# Patient Record
Sex: FEMALE | Race: BLACK OR AFRICAN AMERICAN | Employment: OTHER | URBAN - METROPOLITAN AREA
[De-identification: names, ages, dates, MRNs, and addresses within clinical notes are randomized per-mention and may not be internally consistent; named-entity substitution may affect disease eponyms.]

---

## 2017-01-08 ENCOUNTER — HOSPITAL ENCOUNTER (EMERGENCY)
Age: 62
Discharge: HOME | End: 2017-01-08
Attending: EMERGENCY MEDICINE
Payer: COMMERCIAL

## 2017-01-08 VITALS
HEART RATE: 74 BPM | DIASTOLIC BLOOD PRESSURE: 52 MMHG | TEMPERATURE: 97.5 F | SYSTOLIC BLOOD PRESSURE: 111 MMHG | RESPIRATION RATE: 16 BRPM | OXYGEN SATURATION: 97 %

## 2017-01-08 DIAGNOSIS — F10.920 ACUTE ALCOHOL INTOXICATION, UNCOMPLICATED (HCC): Primary | ICD-10-CM

## 2017-01-08 LAB
ANION GAP BLD CALC-SCNC: 8 MMOL/L (ref 3–18)
BASOPHILS # BLD AUTO: 0 K/UL (ref 0–0.1)
BASOPHILS # BLD: 0 % (ref 0–2)
BUN SERPL-MCNC: 27 MG/DL (ref 7–18)
BUN/CREAT SERPL: 36 (ref 12–20)
CALCIUM SERPL-MCNC: 7.9 MG/DL (ref 8.5–10.1)
CHLORIDE SERPL-SCNC: 104 MMOL/L (ref 100–108)
CO2 SERPL-SCNC: 29 MMOL/L (ref 21–32)
CREAT SERPL-MCNC: 0.76 MG/DL (ref 0.6–1.3)
DIFFERENTIAL METHOD BLD: ABNORMAL
EOSINOPHIL # BLD: 0 K/UL (ref 0–0.4)
EOSINOPHIL NFR BLD: 0 % (ref 0–5)
ERYTHROCYTE [DISTWIDTH] IN BLOOD BY AUTOMATED COUNT: 14.3 % (ref 11.6–14.5)
ETHANOL SERPL-MCNC: 235 MG/DL (ref 0–3)
GLUCOSE SERPL-MCNC: 94 MG/DL (ref 74–99)
HCT VFR BLD AUTO: 38.8 % (ref 35–45)
HGB BLD-MCNC: 12.2 G/DL (ref 12–16)
LYMPHOCYTES # BLD AUTO: 33 % (ref 21–52)
LYMPHOCYTES # BLD: 1.7 K/UL (ref 0.9–3.6)
MCH RBC QN AUTO: 28.8 PG (ref 24–34)
MCHC RBC AUTO-ENTMCNC: 31.4 G/DL (ref 31–37)
MCV RBC AUTO: 91.5 FL (ref 74–97)
MONOCYTES # BLD: 0.2 K/UL (ref 0.05–1.2)
MONOCYTES NFR BLD AUTO: 3 % (ref 3–10)
NEUTS SEG # BLD: 3.3 K/UL (ref 1.8–8)
NEUTS SEG NFR BLD AUTO: 64 % (ref 40–73)
PLATELET # BLD AUTO: 140 K/UL (ref 135–420)
PMV BLD AUTO: 13.1 FL (ref 9.2–11.8)
POTASSIUM SERPL-SCNC: 4.2 MMOL/L (ref 3.5–5.5)
RBC # BLD AUTO: 4.24 M/UL (ref 4.2–5.3)
SODIUM SERPL-SCNC: 141 MMOL/L (ref 136–145)
WBC # BLD AUTO: 5.2 K/UL (ref 4.6–13.2)

## 2017-01-08 PROCEDURE — 80307 DRUG TEST PRSMV CHEM ANLYZR: CPT | Performed by: EMERGENCY MEDICINE

## 2017-01-08 PROCEDURE — 85025 COMPLETE CBC W/AUTO DIFF WBC: CPT | Performed by: EMERGENCY MEDICINE

## 2017-01-08 PROCEDURE — 80048 BASIC METABOLIC PNL TOTAL CA: CPT | Performed by: EMERGENCY MEDICINE

## 2017-01-08 PROCEDURE — 99284 EMERGENCY DEPT VISIT MOD MDM: CPT

## 2017-01-08 RX ORDER — SODIUM CHLORIDE 9 MG/ML
75 INJECTION, SOLUTION INTRAVENOUS CONTINUOUS
Status: DISCONTINUED | OUTPATIENT
Start: 2017-01-08 | End: 2017-01-08 | Stop reason: HOSPADM

## 2017-01-08 NOTE — ED NOTES
Pt arrives by medic heavily intoxicated. Pt came home drunk in someone else's wheelchair and grandson found her on the floor with pillows.

## 2017-01-08 NOTE — ED NOTES
Patient noted to have low BP Dr Bobby Zambrano made aware will administer bolus as ordered. Patient is easily arousable and lucid when awakened.

## 2017-01-08 NOTE — ED NOTES
On arrival Dr Alana Wilson reported to bedside and pt admitted to having alcohol tonight. Pt denied having any complaints stating, \"I feel fine. \"

## 2017-01-08 NOTE — ED PROVIDER NOTES
HPI Comments: Antoinette Mahajan Is a 57-year-old female past medical history of asthma, hyperlipidemia, hypertension, pulmonary hypertension, COPD, obstructive sleep apnea, diastolic heart failure, continuous supplemental oxygen, latent TB treated in 1986, Chronic lung disease who presents with    Alcoholl intoxication. Patient has no complaints whatsoever she did not want to come she states she feels fine. She was reportedly watching her 5year-old grandson she got out and admitted she has been drinking very heavily he found her on the floor and called paramedics. Paramedics stated that they had a difficult time getting the patient to be seen she states she will be she feels fine. She continues to state that she is fine. And she has no complaints whatsoever. The grandson's mother is on her way here. No other aggravating or alleviating factors. No other associated symptoms. This document was created with voice recognition technology and unrecognized errors may be present. Patient is a 59 y.o. female presenting with intoxication. Alcohol intoxication   Primary symptoms include: intoxication. Past Medical History:   Diagnosis Date    LALO (acute kidney injury) ([facility] [address] 12/27/2015    ARF (acute respiratory failure) ([facility] [address] 12/27/2015    Asthma     Chronic lung disease     COPD (chronic obstructive pulmonary disease) (HCC)     Dependence on continuous supplemental oxygen     Diastolic heart failure (HCC)     Echocardiogram abnormal 12/29/2015     Hyperdynamic. EF 75%. No WMA. Gr 1 DDfx. Mild RVE. RVSP 60-70 mmHg (prev 120 mmHg on 10/1/15). Mild LAE. Mod-severe ELISHA. Mild-mod TR.  Gastric wall thickening 2009     Chronic Proximal Gastric Wall Calcifications.      Hyperlipidemia     Hypertension     ANNELIESE (obstructive sleep apnea)     Pulmonary arterial hypertension (HCC)     Pulmonary hypertension (HCC)      RVSP 120 mm Hg, cor pulmonale (echo Oct 2015)    Right upper lobe pneumonia 12/27/2015     Cavitary RUL Pneumonia    TB lung, latent 1986     Diagnosed/Treated.  Thallium stress test 11/12/2002     No prior infarction. Minimal to mild apical ischemia vs apical thinning. RVE.  Tuberculosis        Past Surgical History:   Procedure Laterality Date    Hx gastric bypass      Hx tubal ligation      Hx dilation and curettage      Hx tonsillectomy      Hx orthopaedic       bone spur    Hx hysterectomy      Bronchoscopy  01/04/2016     Dr. Gerri Noguera          History reviewed. No pertinent family history. Social History     Social History    Marital status: SINGLE     Spouse name: N/A    Number of children: N/A    Years of education: N/A     Occupational History    Not on file. Social History Main Topics    Smoking status: Former Smoker     Packs/day: 1.50     Years: 20.00     Types: Cigarettes     Quit date: 1/1/1980    Smokeless tobacco: Never Used    Alcohol use Yes      Comment: social     Drug use: No    Sexual activity: Not on file     Other Topics Concern    Not on file     Social History Narrative         ALLERGIES: Review of patient's allergies indicates no known allergies. Review of Systems   All other systems reviewed and are negative. There were no vitals filed for this visit. Physical Exam   Constitutional: She is oriented to person, place, and time. She appears well-developed. HENT:   Head: Normocephalic and atraumatic. Eyes: EOM are normal. Pupils are equal, round, and reactive to light. Neck: Normal range of motion. Neck supple. Cardiovascular: Normal rate, regular rhythm and normal heart sounds. Exam reveals no friction rub. No murmur heard. Pulmonary/Chest: Effort normal and breath sounds normal. No respiratory distress. She has no wheezes. Abdominal: Soft. She exhibits no distension. There is no tenderness. There is no rebound and no guarding. Musculoskeletal: Normal range of motion.    Neurological: She is alert and oriented to person, place, and time. Skin: Skin is warm and dry. Psychiatric: She has a normal mood and affect. Her behavior is normal. Thought content normal.        MDM  Number of Diagnoses or Management Options  Diagnosis management comments:  Patient presents admitting to drinking heavily moderately intoxicated and is awake and alert. She has no complaints whatsoever and I do not see any blood work or imaging indicated. Will wait for family to arrive. I think the key issue she was on the floor and her 5year-old grandson did not know what to do so he called paramedics. He is currently in the custody of a concerned neighbor and his mother is on her way here. Will obs here; daughter can not go ot pt's house and pt can not go to daughters house due to needing oxygen.      ED Course       Procedures

## 2017-01-08 NOTE — ED NOTES
Received bedside report from BRYN Arriaga. Pt currently resting in bed on cardiac monitor with normal saline infusing. Pt appears to be intoxicated. Pt is discharged once alcohol intoxication decreases. Will continue to monitor.

## 2017-01-08 NOTE — ED NOTES
Patient brought in by EMS after they were called by the patients grand child after she was found on the ground in their apartment next to a strange wheel chair and could not be woken up she has etoh on breath.

## 2017-01-08 NOTE — DISCHARGE INSTRUCTIONS
On axial load. Acute Alcohol Intoxication: Care Instructions  Your Care Instructions  You have had treatment to help your body rid itself of alcohol. Too much alcohol upsets the body's fluid balance. Your doctor may have given you fluids and vitamins. For some people, drinking too much alcohol is a one-time event. For others, it is an ongoing problem. In either case, it is serious. It can be life-threatening. Follow-up care is a key part of your treatment and safety. Be sure to make and go to all appointments, and call your doctor if you are having problems. It's also a good idea to know your test results and keep a list of the medicines you take. How can you care for yourself at home? · Be safe with medicines. Take your medicines exactly as prescribed. Call your doctor if you think you are having a problem with your medicine. · Your doctor may have prescribed disulfiram (Antabuse). Do not drink any alcohol while you are taking this medicine. You may have severe or even life-threatening side effects from even small amounts of alcohol. · If you were given medicine to prevent nausea, be sure to take it exactly as prescribed. · Before you take any medicine, tell your doctor if:  ¨ You have had a bad reaction to any medicines in the past.  ¨ You are taking other medicines, including over-the-counter ones, or have other health problems. ¨ You are or could be pregnant. · Be prepared to have some symptoms of withdrawal in the next few days. · Drink plenty of liquids in the next few days. · Seek help if you need it to stop drinking. Getting counseling and joining a support group can help you stay sober. Try a support group such as Alcoholics Anonymous. · Avoid alcohol when you take medicines. It can react with many medicines and cause serious problems. When should you call for help? Call 911 anytime you think you may need emergency care.  For example, call if:  · You feel confused and are seeing things that are not there. · You are thinking about killing yourself or hurting others. · You have a seizure. · You vomit blood or what looks like coffee grounds. Call your doctor now or seek immediate medical care if:  · You have trembling, restlessness, sweating, and other withdrawal symptoms that are new or that get worse. · Your withdrawal symptoms come back after not bothering you for days or weeks. · You can't stop vomiting. Watch closely for changes in your health, and be sure to contact your doctor if:  · You need help to stop drinking. Where can you learn more? Go to http://eduar-carlos.info/. Enter T102 in the search box to learn more about \"Acute Alcohol Intoxication: Care Instructions. \"  Current as of: May 27, 2016  Content Version: 11.1  © 5769-0509 [employer]. Care instructions adapted under license by [employer] (which disclaims liability or warranty for this information). If you have questions about a medical condition or this instruction, always ask your healthcare professional. [address] any warranty or liability for your use of this information.

## 2017-01-08 NOTE — ED NOTES
Pt with a discharge plan by Dr. Vipul Diez. Pt has multiple comorbidities and per reports drank heavily. She was found to be poorly responsive. Pt clinically intoxicated per report. Pt in the ED has slurred speech but is moving extremities well. Pt is on home oxygen and BP was low with some response to fluids. Will follow renal function, etoh to confirm the cause of her AMS, CBC, and then reevaluate. Marcel Choudhury, DO 8:31 AM      Labs confirm her intoxication. Pt is alert and wants to ambulate. Will continue to follow for clinical sobriety. Marcel Choudhury, DO 11:42 AM    Pt is oriented x 4 and eating. She would like to go home. Pt notes she has some R sided frontal scalp pain, a small hemtoma is possible. Offered a CT but she denies HA, nausea, vomiting so she refused, and I think that is reasonable. She admits to having vodka for the first time in a while last night. Pt is clinically sober so will proceed with close outpatient care.  Marcel Choudhury, DO 1:16 PM

## 2017-02-10 ENCOUNTER — OFFICE VISIT (OUTPATIENT)
Dept: PULMONOLOGY | Age: 62
End: 2017-02-10

## 2017-02-10 ENCOUNTER — HOSPITAL ENCOUNTER (OUTPATIENT)
Dept: MAMMOGRAPHY | Age: 62
Discharge: HOME | End: 2017-02-10
Attending: FAMILY MEDICINE
Payer: MEDICAID

## 2017-02-10 VITALS
RESPIRATION RATE: 18 BRPM | HEART RATE: 80 BPM | TEMPERATURE: 98.5 F | SYSTOLIC BLOOD PRESSURE: 124 MMHG | WEIGHT: 166 LBS | HEIGHT: 65 IN | OXYGEN SATURATION: 97 % | BODY MASS INDEX: 27.66 KG/M2 | DIASTOLIC BLOOD PRESSURE: 70 MMHG

## 2017-02-10 DIAGNOSIS — J44.9 CHRONIC OBSTRUCTIVE PULMONARY DISEASE, UNSPECIFIED COPD TYPE (HCC): Primary | ICD-10-CM

## 2017-02-10 DIAGNOSIS — J96.11 CHRONIC HYPOXEMIC RESPIRATORY FAILURE (HCC): ICD-10-CM

## 2017-02-10 DIAGNOSIS — I27.20 PULMONARY HTN (HCC): ICD-10-CM

## 2017-02-10 DIAGNOSIS — Z12.31 VISIT FOR SCREENING MAMMOGRAM: ICD-10-CM

## 2017-02-10 PROCEDURE — 77067 SCR MAMMO BI INCL CAD: CPT

## 2017-02-10 RX ORDER — FUROSEMIDE 40 MG/1
40 TABLET ORAL DAILY
Qty: 30 TAB | Refills: 3 | Status: SHIPPED | OUTPATIENT
Start: 2017-02-10

## 2017-02-10 RX ORDER — FLUTICASONE FUROATE AND VILANTEROL 100; 25 UG/1; UG/1
1 POWDER RESPIRATORY (INHALATION) DAILY
Qty: 1 INHALER | Refills: 3 | Status: SHIPPED | OUTPATIENT
Start: 2017-02-10 | End: 2017-11-28 | Stop reason: SDUPTHER

## 2017-02-10 RX ORDER — DULOXETIN HYDROCHLORIDE 60 MG/1
60 CAPSULE, DELAYED RELEASE ORAL DAILY
COMMUNITY
End: 2021-02-12

## 2017-02-10 RX ORDER — FLUTICASONE FUROATE AND VILANTEROL 100; 25 UG/1; UG/1
1 POWDER RESPIRATORY (INHALATION) DAILY
Qty: 1 INHALER | Refills: 0 | Status: SHIPPED | COMMUNITY
Start: 2017-02-10 | End: 2017-11-28 | Stop reason: CLARIF

## 2017-02-10 RX ORDER — SILDENAFIL CITRATE 20 MG/1
20 TABLET ORAL 3 TIMES DAILY
Qty: 90 TAB | Refills: 5 | Status: SHIPPED | OUTPATIENT
Start: 2017-02-10 | End: 2018-05-14 | Stop reason: SDUPTHER

## 2017-02-10 NOTE — MR AVS SNAPSHOT
Visit Information Date & Time Provider Department Dept. Phone Encounter #  
 2/10/2017  9:30 AM Ankit Mcgill MD [facility] [place]  Follow-up Instructions Return in about 2 months (around 4/10/2017). Follow-up and Disposition History Your Appointments 8/25/2017 10:40 AM  
Follow Up with Zaida Zendejas MD  
[facility] ([facility]) Appt Note: Follow up in 1 year [place] [address] [address] [address] [phone] [address] Upcoming Health Maintenance Date Due Hepatitis C Screening 1955 DTaP/Tdap/Td series (1 - Tdap) 5/5/1974 PAP AKA CERVICAL CYTOLOGY 5/5/1974 FOBT Q 1 YEAR AGE 50-75 5/5/2003 ZOSTER VACCINE AGE 60> 5/5/2013 BREAST CANCER SCRN MAMMOGRAM 10/24/2014 INFLUENZA AGE 9 TO ADULT 8/1/2016 Allergies as of 2/10/2017  Review Complete On: 2/10/2017 By: Ankit Mcgill MD  
 No Known Allergies Current Immunizations  Reviewed on 11/30/2016 Name Date Influenza Vaccine 9/1/2015 Influenza Vaccine (Quad) PF 1/11/2016  5:14 PM  
 Pneumococcal Conjugate (PCV-13) 5/9/2016 11:30 AM  
 Pneumococcal Polysaccharide (PPSV-23) 1/11/2016  5:25 PM  
  
 Not reviewed this visit You Were Diagnosed With   
  
 Codes Comments Chronic obstructive pulmonary disease, unspecified COPD type ([email or web address]    -  Primary ICD-10-CM: J44.9 ICD-9-CM: 262 Pulmonary HTN ([email or web address]     ICD-10-CM: I27.2 ICD-9-CM: 416.8 Chronic hypoxemic respiratory failure (HCC)     ICD-10-CM: J96.11 
ICD-9-CM: 518.83, 799.02 Vitals BP Pulse Temp Resp Height(growth percentile) Weight(growth percentile) 124/70 (BP 1 Location: Left arm, BP Patient Position: Sitting) 80 98.5 °F (36.9 °C) (Oral) 18 5' 5\" (1.651 m) 166 lb (75.3 kg) SpO2 BMI OB Status Smoking Status 97% 27.62 kg/m2 Postmenopausal Former Smoker Vitals History BMI and BSA Data Body Mass Index Body Surface Area  
 27.62 kg/m 2 1.86 m 2 Preferred Pharmacy Pharmacy Name Phone [facility] [address] [phone] Your Updated Medication List  
  
   
This list is accurate as of: 2/10/17 10:19 AM.  Always use your most recent med list.  
  
  
  
  
 albuterol 90 mcg/actuation inhaler Commonly known as:  PROVENTIL HFA, VENTOLIN HFA, PROAIR HFA Take 2 Puffs by inhalation every six (6) hours as needed for Wheezing. albuterol-ipratropium 2.5 mg-0.5 mg/3 ml Nebu Commonly known as:  DUO-NEB  
3 mL by Nebulization route four (4) times daily. Ambrisentan 5 mg tablet Commonly known as:  LETAIRIS Take 5 mg by mouth daily. Indications: PULMONARY ARTERIAL HYPERTENSION  
  
 b complex vitamins tablet Commonly known as:  B-COMPLEX Take 1 Tab by mouth daily. benazepril 20 mg tablet Commonly known as:  LOTENSIN Take 20 mg by mouth daily. fluticasone 50 mcg/actuation nasal spray Commonly known as:  Sudhir Sacks 2 Sprays by Both Nostrils route daily. * fluticasone-vilanterol 100-25 mcg/dose inhaler Commonly known as:  BREO ELLIPTA Take 1 Puff by inhalation daily. * fluticasone-vilanterol 100-25 mcg/dose inhaler Commonly known as:  BREO ELLIPTA Take 1 Puff by inhalation daily. furosemide 40 mg tablet Commonly known as:  LASIX Take 1 Tab by mouth daily. gabapentin 300 mg capsule Commonly known as:  NEURONTIN Take 300 mg by mouth three (3) times daily. Incontinence Pad, Liner, Disp Pads Commonly known as:  BLADDER CONTROL PADS FOR WOMEN  
1 Each by Does Not Apply route two (2) times a day. OXYGEN-AIR DELIVERY SYSTEMS  
6 L by Does Not Apply route. Uses 4 l when out  
  
 oxymetazoline 0.05 % nasal spray Commonly known as:  AFRIN  
2 Sprays two (2) times a day. pravastatin 20 mg tablet Commonly known as:  PRAVACHOL Take 20 mg by mouth nightly. sildenafil 20 mg tablet Commonly known as:  REVATIO Take 1 Tab by mouth three (3) times daily. * Notice: This list has 2 medication(s) that are the same as other medications prescribed for you. Read the directions carefully, and ask your doctor or other care provider to review them with you. Prescriptions Sent to Pharmacy Refills  
 sildenafil (REVATIO) 20 mg tablet 5 Sig: Take 1 Tab by mouth three (3) times daily. Class: Normal  
 Pharmacy: [place] [id number]. Ph #: [phone] Route: Oral  
 furosemide (LASIX) 40 mg tablet 3 Sig: Take 1 Tab by mouth daily. Class: Normal  
 Pharmacy: [place] [address]. Ph #: [phone] Route: Oral  
 fluticasone-vilanterol (BREO ELLIPTA) 100-25 mcg/dose inhaler 3 Sig: Take 1 Puff by inhalation daily. Class: Normal  
 Pharmacy: [place] [address]. Ph #: [phone] Route: Inhalation Follow-up Instructions Return in about 2 months (around 4/10/2017). To-Do List   
 02/10/2017 12:00 PM  
  Appointment with ANA MARIA WHITFIELD 2 at [address] [place] [address] ([phone]) OUTSIDE FILMS  - Any outside films related to the study being scheduled should be brought with you on the day of the exam.  If this cannot be done there may be a delay in the reading of the study. MEDICATIONS  - Patient must bring a complete list of all medications currently taking to include prescriptions, over-the-counter meds, herbals, vitamins & any dietary supplements  GENERAL INSTRUCTIONS  - On the day of your exam do not use any bath powder, deodorant or lotions on the armpit area. -Tenderness of breasts may cause an increase of discomfort during procedure.   If you are experiencing breast tenderness on the day of your appointment and would like to reschedule, please call [phone]. Introducing [facility]! Dear Desmond Stephens: Thank you for requesting a [employer] account. Our records indicate that you already have an active [employer] account. You can access your account anytime at [email or web address] Did you know that you can access your hospital and ER discharge instructions at any time in [employer]? You can also review all of your test results from your hospital stay or ER visit. Additional Information If you have questions, please visit the Frequently Asked Questions section of the [employer] website at [email or web address] Remember, [employer] is NOT to be used for urgent needs. For medical emergencies, dial 911. Now available from your iPhone and Android! Please provide this summary of care documentation to your next provider. Your primary care clinician is listed as [address]. If you have any questions after today's visit, please call [phone].

## 2017-02-10 NOTE — PROGRESS NOTES
[facility]  Pulmonary, Critical Care, and Sleep Medicine  Pulmonary Office    Name: Akash Duarte MRN: [id number]   : 1955 Hospital:    Date: 2/10/2017  Room: Room/bed info not found     Subjective:     COPD and Pulmonary HTN  Patient is a 59 y. o.AA female with severe COPD and Pulmonary HTN with hypoxemia needing home oxygen with low O2 sats at baseline. She was started on Letairis 5 mg daily. She was admitted to [facility] end of Dec 2015 with respiratory failure, worsened hypoxemia, cough, blood streaked sputum. CT chest and CXR showing new RT upper lobe pneumonia and cavitation. Due to past hx of +ve PPD with INH treatment, she was in respiratory isolation. She needed to be in icu with need for BIPAP and high flow oxygen. She was taken off respiratory isolation after 3 sputum samples with negative stain for AFB. She also underwent a bronchoscopy and BAL in the icu on 16. 2 sputum samples have shown ELSA and patient started on triple antimycobacterial therapy since 16 . She took triple therapy for 5 months and due to loss of insurance cover, patient was unable to get medications since 2016. CT chest 2016 shows significant improvement suggesting that the right upper lobe cavitary pneumonia was bacterial. Patient was subsequently discharged on home O2 at 7-10 lits concentration. Patient oxygenation has improved steadily and is now down to 3 lits nc O2 at home at rest and 4 lits on exertion. Patient has come for follow up  Patient breathing remains good and stable; she does her own house work and goes to shopping in [place] with portable oxygen. She would stop to rest frequently. NYHA 2 dyspnea  She uses 4 lits portable O2 for ambulation. She has no cough, no hemoptysis  No night sweats;  No chest pains or fever or chills  Weight stable  RT wrist pains - carrpal tunnel syndrome, awaiting surgery - planned in [place] [id number] next week    She is on Duonebs qid at home but uses prn nowadays, prn albut inh, she could not afford budesonide nebs  She is on oral Ambrisentan 5 mg daily  She is now on sildenafil     [facility] ER visit in Jan 2017 related to alcohol     She has no prior hx of CTD or Sarcoidosis or DVTs or PEs. SH: She stopped smoking about 20 years ago. Patient admits to drinking excessive wine > 2 glasses on some days      RIGHT HEART CATH DR [facility] 10/23/15  Findings/PostOp Diagnosis:  1. Severe elevation of right sided pressures  2. Mild elevation of left sided pressures  3. Severe pulmonary hypertension  4. No intracardiac left-to-right shunting by oximetry run in the setting of significant arterial hypoxemia on room air  5. Mildly decreased cardiac output  6. Severely elevated pulmonary vascular resistance  7. No significant change in PA pressure or PVR after inhaled NO and oxygen supplementation    Plan:  1.  Medical management as outpatient; she will be started on supplemental oxygen by her Pulmonologist    Procedures:  * LHC   * RHC  * Pharmacologic intervention and repeat hemodynamic assessment    Hemodynamics:     Baseline    RA: 19 mmHg  RV: 90/20 mmHg  PA: 88/34 mmHg Mean 52  PCWP: 17 mmHg  LV: 130/14 mmHg  CO: 4 L/min by thermodilution, 4.3 L/min by Reba  CI: 2.2 L/Min/m2, 2.3 L/min by Reba    Oxygen saturation (%):   SVC:49  IVC:45  PA:46  LV:70  No evidence of intracardiac shunts by saturation measurements    Pulmonary Vascular Resistance: 700 dynes * sec * cm-5; 8.8 Wood units    After inhaled NO 80 ppm for 10 minutes    RA: 18 mm Hg  PA: 80/32 mmHg, mean 45  PCWP: 16 mmHg  LV: 135/15 mmHg  Ao: 132/70 mmHg    CO/CI (thermodilution): 3.8 L/min, 2 L/min/m2    Pulmonary Vascular Resistance: 610 dynes * sec * cm-5; 7.7 Wood units        Past Medical History   Diagnosis Date    [facility]ALO [facility]acute kidney injury) ([facility] [address] 12/27/2015    [facility]F [facility] respiratory failure[facility] ([facility]) 12/27/2015    Asthma     Chronic lung disease     COPD (chronic obstructive pulmonary disease) ([facility] [address]     Dependence on continuous supplemental oxygen     Diastolic heart failure (HCC)     Echocardiogram abnormal 12/29/2015     Hyperdynamic. EF 75%. No WMA. Gr 1 DDfx. Mild RVE. RVSP 60-70 mmHg (prev 120 mmHg on 10/1/15). Mild LAE. Mod-severe ELISHA. Mild-mod TR.  Gastric wall thickening 2009     Chronic Proximal Gastric Wall Calcifications.  Hyperlipidemia     Hypertension     ANNELIESE (obstructive sleep apnea)     Pulmonary arterial hypertension (HCC)     Pulmonary hypertension (HCC)      RVSP 120 mm Hg, cor pulmonale (echo Oct 2015)    Right upper lobe pneumonia 12/27/2015     Cavitary RUL Pneumonia    TB lung, latent 1986     Diagnosed/Treated.  Thallium stress test 11/12/2002     No prior infarction. Minimal to mild apical ischemia vs apical thinning. RVE.  Tuberculosis       Past Surgical History   Procedure Laterality Date    Hx gastric bypass      Hx tubal ligation      Hx dilation and curettage      Hx tonsillectomy      Hx orthopaedic       bone spur    Hx hysterectomy      Bronchoscopy  01/04/2016     Dr. Ottoniel Wu       No Known Allergies   Social History   Substance Use Topics    Smoking status: Former Smoker     Packs/day: 1.50     Years: 20.00     Types: Cigarettes     Quit date: 1/1/1980    Smokeless tobacco: Never Used    Alcohol use Yes      Comment: social          Current Outpatient Prescriptions   Medication Sig    OXYGEN-AIR DELIVERY SYSTEMS 6 L by Does Not Apply route. Uses 4 l when out    pravastatin (PRAVACHOL) 20 mg tablet Take 20 mg by mouth nightly.  sildenafil (REVATIO) 20 mg tablet Take 1 Tab by mouth three (3) times daily.  Ambrisentan (LETAIRIS) 5 mg tablet Take 5 mg by mouth daily. Indications: PULMONARY ARTERIAL HYPERTENSION    oxymetazoline (AFRIN) 0.05 % nasal spray 2 Sprays two (2) times a day.  b complex vitamins (B-COMPLEX) tablet Take 1 Tab by mouth daily.     fluticasone (FLONASE) 50 mcg/actuation nasal spray 2 Sprays by Both Nostrils route daily.  furosemide (LASIX) 40 mg tablet Take 1 Tab by mouth daily.  albuterol (PROVENTIL HFA, VENTOLIN HFA, PROAIR HFA) 90 mcg/actuation inhaler Take 2 Puffs by inhalation every six (6) hours as needed for Wheezing.  albuterol-ipratropium (DUO-NEB) 2.5 mg-0.5 mg/3 ml nebulizer solution 3 mL by Nebulization route four (4) times daily.  benazepril (LOTENSIN) 20 mg tablet Take 20 mg by mouth daily.  gabapentin (NEURONTIN) 300 mg capsule Take 300 mg by mouth three (3) times daily.  Incontinence Pad, Liner, Disp (BLADDER CONTROL PADS FOR WOMEN) pads 1 Each by Does Not Apply route two (2) times a day. No current facility-administered medications for this visit.         Review of Systems:  HEENT: + epistaxis, no nasal drainage, no difficulty in swallowing  Respiratory: as above; no hemoptysis  Cardiovascular: no chest pain, no palpitations, no chronic leg edema, no syncope  Gastrointestinal: no abd pain, no vomiting, no diarrhea, no bleeding symptoms  Genitourinary: No urinary symptoms  Integument/breast: No ulcers  Musculoskeletal:Neg  Neurological: No focal weakness, no seizures, no headaches  Behvioral/Psych: No anxiety, no depression  Constitutional: No fever, no chills, no more weight loss, no night sweats   Lymphatic: no lumps  Allergies: no symptoms    Objective:   Vital Signs:    Visit Vitals    /70 (BP 1 Location: Left arm, BP Patient Position: Sitting)    Pulse 80    Temp 98.5 °F (36.9 °C) (Oral)    Resp 18    Ht 5' 5\" (1.651 m)    Wt 75.3 kg (166 lb)    SpO2 97%  Comment: oxygen @ 4 L    BMI 27.62 kg/m2         Physical Exam:   General: comfortable; on 3 lits nc; acyanotic  No jaundice in sclerae  Neck: No adenopathy or thyroid swelling  CVS: S1S2 no murmurs; JVD not seen; no HJ reflux; 2nd HS soft  RS: Good AE bilateral; lungs clear, symmetrical breath sounds  Abd: soft, non tender  Neuro: non focal, awake, alert  Extrm: no leg edema or clubbing  Skin: no rash      Data review:        Ref. Range 11/5/2015 11:35   Angiotensin Converting Enzyme (ACE) Latest Range: 14 - 82 U/L <14 (L)   C-Reactive Protein, Qt Latest Range: 0.0-0.5 mg/dL 0.7 (H)   CCP Antibody IgG Latest Range: <=2.9 U/mL 0.7       1/1/2016 12:35 AM - Lab In [facility]       Component Results      Component Value Flag Ref Range Units Status     QuantiFERON TB Gold Indeterminate  NEGATIVE   Final     1/1/2016  2:36 PM - Lab In [facility]       Component Results      Component Value Flag Ref Range Units Status     Source SPUTUM     Final     M. tuberculosis DNA, PCR NEGATIVE   NEGATIVE    Final     11/7/2015  1:41 PM - [facility]       Component Results      Component Value Flag Ref Range Units Status     ANTI-DNA (DS) AB QN <1  0 - 9  IU/mL Final     Comment:                                         Negative      <5                                      Equivocal  5 - 9                                      Positive      >9        EMANUEL RIBONUCLEOPROTEIN AB <0.2  0.0 - 0.9  AI Final     EMANUEL SMITH AB <0.2  0.0 - 0.9  AI Final     Scleroderma Ab <0.2  0.0 - 0.9  AI Final     ANTI SS-A <0.2  0.0 - 0.9  AI Final     ANTI SS-B <0.2  0.0 - 0.9  AI Final     ANTICHROMATIN IGG ANTIBODIES <0.2  0.0 - 0.9  AI Final     JO1 ANTIBODY <0.2  0.0 - 0.9  AI Final     Anti-Centromere B Antibodies <0.2  0.0 - 0.9  AI Final        Ref. Range 6/28/2016 10:52   Bilirubin, total Latest Ref Range: 0.2-1.0 MG/DL 0.3   Bilirubin, direct Latest Ref Range: 0.0-0.2 MG/DL 0.1   Protein, total Latest Ref Range: 6.4-8.2 g/dL 7.1   Albumin Latest Ref Range: 3.4-5.0 g/dL 3.9   Globulin Latest Ref Range: 2.0-4.0 g/dL 3.2   A-G Ratio Latest Ref Range: 0.8-1.7   1.2   ALT Latest Ref Range: 13-56 U/L 32   AST Latest Ref Range: 15-37 U/L 31   Alk. phosphatase Latest Ref Range:  U/L 79         ECHO 12/29/2015  SUMMARY:  Left ventricle: Size was normal. Systolic function was hyperdynamic by visual assessment.  Ejection fraction was estimated to be 75 %. No obvious  wall motion abnormalities identified in the views obtained. Wall thickness was normal. Doppler parameters were consistent with abnormal left  ventricular relaxation (grade 1 diastolic dysfunction). Right ventricle: The ventricle was mildly dilated. Systolic function was mildly reduced. Wall thickness was increased. Systolic pressure was  moderately increased. Estimated peak pressure was in the range of 60 mmHg to 70 mmHg. Left atrium: The atrium was mildly dilated. Right atrium: The atrium was moderately to severely dilated. Tricuspid valve: There was mild to moderate regurgitation. Inferior vena cava, hepatic veins: The respirophasic change in diameter was less than 50%. COMPARISONS:  Comparison was made with the previous study of 01-Oct-2015. Previous systolic pulmonary artery pressure of 120 mmHg has decreased to 60-70 mmHg.       PFTs 10/20/2015  Flows:  FEV 1% is reduced  FEV1 0.66 L(31%)  FVC 1.82 L (67%)    Volumes:  Vital Capacity is reduced, Residual Volume is elevated and Total  Lung Capacity is normal (95%)    Flow Volume Loop:  Nonspecific obstructive pattern in Flow Volume Loop    Bronchodilator:  Significant improvement with bronchodilator  FVC 540cc / 30%, FEV1 150cc / 22%    Diffusion:  Abnormal Diffusion Capacity reduced to 29% predicted, corrected for Hb of 17.4 gm/dl    Impression:  Very Severe Airflow Obstructive defect  Air trapping    Significant bronchodilator response  Severe decrease in corrected diffusion capacity  Polycythemia    Imaging:  I have personally reviewed the patients radiographs and have reviewed the reports:  CT chest 12/28/15  IMPRESSION:    1. Large, 6 cm, air and fluid-filled irregular peripheral cavity at the superior right medial chest  -caudal to the cavity there is a large, 9 cm region of intermediate to low attenuation, with lesser same at lateral margins  -Large mostly dependent right pleural effusion  -Extensive right lung alveolar disease elsewhere  -Numerous small likely calcifications within the right lung disease, cavitary margins and elsewhere  -Likely middle mediastinal adenopathy, 2 cm precarinal lymph node  2. This right lung disease is nonspecific. This may be complicated infectious pneumonia. Given the history of +PPD and the calcifications within this, this could be tuberculous pneumonia  -A necrotic tumor is possible  3. The fluid containing cavity may be lung abscess, necrotic tumor, or, less likely, empyema with bronchopleural fistula  -A contrast-enhanced CT scan might be able to make the distinction  4. Very advanced emphysema  5. Cardiomegaly  6. Pulmonary arterial hypertension  7. Calcific splenic granulomas, potentially tuberculous  8. Chronic (present 2009) heavy calcification of portions of the proximal gastric wall, nonspecific     EXAM: CT of the chest without IV contrast. 2/17/2016     INDICATION: Right upper lobe cavitary pneumonia. TECHNIQUE: CT of the chest without IV contrast. Sagittal and coronal reformations obtained.    COMPARISON: CT dated 12/28/2015 and chest x-ray dated 1/14/2016    IMPRESSION:  1.   Significant improvement in the appearance of the lungs since prior imaging with scarring in the right apex at the area of previously seen cavitary lesion. 2.  Minimal interstitial thickening in the lower lung fields with near complete resolution of the previously seen interstitial infiltrates. 3.  Extensive centrilobular emphysematous changes. 4.  Interval resolution of the previously seen right pleural effusion. 5.  Mild prominence of the proximal pulmonary arteries which may represent an element of pulmonary artery hypertension. CT chest without contrast 11/9/16  HISTORY: Follow-up pulmonary abnormality  COMPARISON: Chest CT 2/17/2016 and 26/03/5407  TECHNIQUE: Helical scans through the chest is obtained from the thoracic inlet  to the diaphragm without IV contrast administration.   FINDINGS: The study is suboptimal due to lack of IV contrast. Subtle  abnormality can be under detected.     Lung Parenchyma: The large cavitary lesion with markedly thickened wall at  medial right upper lung on prior study in 12/15 had being dramatically improved  with patchy groundglass and fibrotic opacities at medial right upper lobe showed  on study 2/17/16. Now near interval resolution noted with only minimal residual  groundglass opacities noted in the region. Advanced COPD with large bullae in  bilateral upper lungs again noted. The atelectasis in right middle lobe are  partially interval resolved. No acute pulmonary finding.     Thyroid: Unremarkable. Mediastinum: Improved borderline lymph node in pretracheal region. No new  adenopathy.    Pleural Space And Chest Wall: No significant pleural pathology. Improved chest  wall edema. Heart: The heart and the pericardium appear normal.  Vasculature: The aorta and the great vessels are unremarkable.    Imaged Upper Abdomen: Gallstones partially seen. Osseous Structures: Unremarkable for age.       IMPRESSION  1. The fibrotic scarring at the medial right upper lung on prior CT in 2/16  shows near complete interval resolution with only residual minimal groundglass  scarring along current study. 2. Advanced COPD. No acute pulmonary finding. 3. Borderline mediastinal lymph nodes, more likely reactive. 4. Cholelithiasis. Mild chest wall edema.     Discussion:  Chronic advanced bullous emphysema  The right upper lobe pneumonia has resolved  Called and edison patient over phone        Sputum cx 12/29/15  Culture result:    Preliminary      IDENTIFIED BY [facility] AS:   MYCOBACTERIUM AVIUM COMPLEX   IDENTIFIED BY DNA PROBE   Reported to Dept of Health      Sputum cx 12/28/15   Culture result:   Preliminary     2+ AFB   SUBCULTURE IN PROGRESS, HOLDING FOR SUFFICIENT GROWTH      SIX MINUTE WALK TEST 10/29/15    Patient walked for 4 mins  Total distance walked 440 FT  Resting O2 sats 91% 4 lits oxygen, lowest O2 sats 78% on 4 lits  oxygen while walking  Highest HR 75/min while walking  Dyspnea scale highest was only 1. Impression: Limited capacity during 6min walk test with oxygen  desaturation  Home O2 4 lits rest and 5 lits ambulation    Impression   SIX MINUTE WALK TEST 11/20/16  Patient walked for 6 mins  Total distance walked 204 M / 669 FT  Oxygen 3 lits rest and 4-5 lits during walking    Noted improvement in six min walk test       IMPRESSION:   · Severe COPD by GOLD criteria  · Severe Pulmonary HTN - group 1  · Chronic hypoxemic resp failure   · RT upper lobe lung abscess with RT sided pneumonia - likely bacterial plus ELSA infection - resolved on subsequent CT chest Nov 2016  · Non-tuberculous mycobacterial infection - ELSA pneumonia - likely colonization than true infection; took triple anti-mycobacterial therapy x 5 months and stopped due to lack of medical insurance  · Mild thrombocytopenia  · Home oxygen therapy - 3 lits at home currently  · RT wrist carpal tunnel syndrome  · Vaccinations: Uptodate on flu and pneumococcal vaccinations in PCP office Influenza Vaccine (Quad) PF 1/11/2016 New Pneumococcal Polysaccharide (PPV-23) 1/11/2016; Pneumococcal Conjugate (PCV-13) 5/9/2016      RECOMMENDATIONS:   · Patient has advanced Pulm HTN - likely has pulm vasc remodeling from chronic hypoxemia and hence has group 1 Pulm HTN now. She has been on Ambrisentan 5 mg daily and Sildenafil 20 mg tolerating well. I will send another prescription for sildenafil 20 mg tid. Watch BP and oxygenation after starting this. NO nitrates due to risk of profound hypotension. · Oxygenation and BP has been stable; she has home pulse oximeter; Echo showed improvement in pulmonary arterial pressures.   · Continue home oxygen at 3 lits rest; 4 lits on ambulation  · Duonebs prn; added Breo Ellipta 100 mcg at 1 puff daily am - watch for side effects such as palpitations, tremors; gargle with mouthwash  · CT chest Nov 2016 shows resolution of the right upper lobe cavitary pneumonia. · Refer to pulm rehab - patient waiting for wrist surgery to be completed  · Discussed to limit alcohol   · FU 2 mths  · D.w patient in detail      Complex medical review and management.          Rachele Ritchie MD

## 2017-06-13 ENCOUNTER — HOSPITAL ENCOUNTER (OUTPATIENT)
Dept: LAB | Age: 62
Discharge: HOME | End: 2017-06-13
Payer: MEDICAID

## 2017-06-13 ENCOUNTER — HOSPITAL ENCOUNTER (OUTPATIENT)
Dept: GENERAL RADIOLOGY | Age: 62
Discharge: HOME | End: 2017-06-13
Attending: FAMILY MEDICINE
Payer: MEDICAID

## 2017-06-13 DIAGNOSIS — M79.89 SWELLING OF LIMB: ICD-10-CM

## 2017-06-13 PROCEDURE — 73130 X-RAY EXAM OF HAND: CPT

## 2017-09-12 ENCOUNTER — OFFICE VISIT (OUTPATIENT)
Dept: PULMONOLOGY | Age: 62
End: 2017-09-12

## 2017-09-12 VITALS
OXYGEN SATURATION: 98 % | SYSTOLIC BLOOD PRESSURE: 150 MMHG | HEART RATE: 65 BPM | WEIGHT: 175 LBS | HEIGHT: 65 IN | DIASTOLIC BLOOD PRESSURE: 82 MMHG | RESPIRATION RATE: 20 BRPM | TEMPERATURE: 98.3 F | BODY MASS INDEX: 29.16 KG/M2

## 2017-09-12 DIAGNOSIS — J18.9 CAVITARY PNEUMONIA: ICD-10-CM

## 2017-09-12 DIAGNOSIS — Z87.09 HISTORY OF ASTHMA: ICD-10-CM

## 2017-09-12 DIAGNOSIS — J43.9 BULLOUS EMPHYSEMA (HCC): ICD-10-CM

## 2017-09-12 DIAGNOSIS — Z99.81 HYPOXEMIA REQUIRING SUPPLEMENTAL OXYGEN: ICD-10-CM

## 2017-09-12 DIAGNOSIS — I27.20 PULMONARY HYPERTENSION (HCC): Primary | ICD-10-CM

## 2017-09-12 DIAGNOSIS — J98.4 CAVITARY PNEUMONIA: ICD-10-CM

## 2017-09-12 DIAGNOSIS — R09.02 HYPOXEMIA REQUIRING SUPPLEMENTAL OXYGEN: ICD-10-CM

## 2017-09-12 NOTE — PROGRESS NOTES
HISTORY OF PRESENT ILLNESS  Jaxson Mendez is a 57 y.o. female. HPI Comments: COPD and Pulmonary HTN, last PASP 60-70 mm Hg. Previously followed by Dr. Rashad Carson  Patient is a 59 y. o.AA female with severe COPD and Pulmonary HTN with hypoxemia needing home oxygen with low O2 sats at baseline. She was started on Letairis 5 mg daily. She was admitted to [facility] end of Dec 2015 with respiratory failure, worsened hypoxemia, cough, blood streaked sputum. CT chest and CXR showing new RT upper lobe pneumonia and cavitation. Due to past hx of +ve PPD with INH treatment, she was in respiratory isolation. She needed to be in icu with need for BIPAP and high flow oxygen. She was taken off respiratory isolation after 3 sputum samples with negative stain for AFB. She also underwent a bronchoscopy and BAL in the icu on 1/4/16. 2 sputum samples have shown ELSA and patient started on triple antimycobacterial therapy since 2/25/16 . She took triple therapy for 5 months and due to loss of insurance cover, patient was unable to get medications since Jul 2016. CT chest Nov 2016 shows significant improvement suggesting that the right upper lobe cavitary pneumonia was bacterial. Patient was subsequently discharged on home O2 at 7-10 lits concentration. Patient oxygenation has improved steadily and is now down to 3 lits nc O2 at home at rest and 4 lits on exertion. Pt does complain of some SOB on moderate exertion. Denies chest pain or hemoptysis. No fever or chills. Pt notes episodic SOB with wheezing, sometimes triggered by dust, fumes and strong smells, treated with Albuterol rescue inhaler. Pt was previously on Advair but for unknown reasons is off this.        COPD   Associated symptoms include shortness of breath. Pertinent negatives include no chest pain, no abdominal pain and no headaches. Follow-up   Associated symptoms include shortness of breath.  Pertinent negatives include no chest pain, no abdominal pain and no headaches. Review of Systems   Constitutional: Positive for malaise/fatigue. Negative for chills, diaphoresis, fever and weight loss. HENT: Negative for congestion, ear discharge, ear pain, hearing loss, nosebleeds, sinus pain, sore throat and tinnitus. Eyes: Negative for blurred vision, double vision, photophobia, pain, discharge and redness. Respiratory: Positive for shortness of breath. Negative for cough, hemoptysis, sputum production and stridor. Wheezing: rare. Cardiovascular: Positive for leg swelling (intermittent). Negative for chest pain, palpitations, orthopnea, claudication and PND. Gastrointestinal: Negative for abdominal pain, blood in stool, constipation, diarrhea, heartburn, melena, nausea and vomiting. Genitourinary: Negative for dysuria, flank pain, frequency, hematuria and urgency. Musculoskeletal: Positive for joint pain. Negative for back pain, falls, myalgias and neck pain. Skin: Negative for itching and rash. Neurological: Negative for dizziness, tingling, tremors, sensory change, speech change, focal weakness, seizures, loss of consciousness, weakness and headaches. Endo/Heme/Allergies: Negative for environmental allergies and polydipsia. Does not bruise/bleed easily. Psychiatric/Behavioral: Negative for depression, hallucinations, memory loss, substance abuse and suicidal ideas. The patient is not nervous/anxious and does not have insomnia. Past Medical History:   Diagnosis Date    [facility] 12/27/2015    ARF (acute respiratory failure) ([facility] 12/27/2015    Asthma     Chronic lung disease     COPD [facility] obstructive pulmonary disease[facility] ([facility])     Dependence on continuous supplemental oxygen     Diastolic heart failure ([facility])     Echocardiogram abnormal 12/29/2015    Hyperdynamic. EF 75%. No WMA. Gr 1 DDfx. Mild RVE. RVSP 60-70 mmHg (prev 120 mmHg on 10/1/15). Mild LAE. Mod-severe ELISHA. Mild-mod TR.       Gastric wall thickening 2009    Chronic Proximal Gastric Wall Calcifications.  Hyperlipidemia     Hypertension     ANNELIESE (obstructive sleep apnea)     Pulmonary arterial hypertension (HCC)     Pulmonary hypertension (HCC)     RVSP 120 mm Hg, cor pulmonale (echo Oct 2015)    Right upper lobe pneumonia ([address] 12/27/2015    Cavitary RUL Pneumonia    TB lung, latent 1986    Diagnosed/Treated.  Thallium stress test 11/12/2002    No prior infarction. Minimal to mild apical ischemia vs apical thinning. RVE.  Tuberculosis      Past Surgical History:   Procedure Laterality Date    BRONCHOSCOPY  01/04/2016    Dr. Staci Hart HX DILATION AND CURETTAGE      HX GASTRIC BYPASS      HX HYSTERECTOMY      HX ORTHOPAEDIC      bone spur    HX TONSILLECTOMY      HX TUBAL LIGATION       Current Outpatient Prescriptions on File Prior to Visit   Medication Sig Dispense Refill    OXYGEN-AIR DELIVERY SYSTEMS 6 L by Does Not Apply route. Uses 4 l when out      sildenafil (REVATIO) 20 mg tablet Take 1 Tab by mouth three (3) times daily. 90 Tab 5    furosemide (LASIX) 40 mg tablet Take 1 Tab by mouth daily. 30 Tab 3    pravastatin (PRAVACHOL) 20 mg tablet Take 20 mg by mouth nightly.  Incontinence Pad, Liner, Disp (BLADDER CONTROL PADS FOR WOMEN) pads 1 Each by Does Not Apply route two (2) times a day. 54 Each 0    Ambrisentan (LETAIRIS) 5 mg tablet Take 5 mg by mouth daily. Indications: PULMONARY ARTERIAL HYPERTENSION 30 Tab 5    albuterol-ipratropium (DUO-NEB) 2.5 mg-0.5 mg/3 ml nebulizer solution 3 mL by Nebulization route four (4) times daily. 120 Vial 5    benazepril (LOTENSIN) 20 mg tablet Take 20 mg by mouth daily.  fluticasone-vilanterol (BREO ELLIPTA) 100-25 mcg/dose inhaler Take 1 Puff by inhalation daily. 1 Inhaler 0    fluticasone-vilanterol (BREO ELLIPTA) 100-25 mcg/dose inhaler Take 1 Puff by inhalation daily.  1 Inhaler 3    DULoxetine (CYMBALTA) 60 mg capsule Take 60 mg by mouth daily.  gabapentin (NEURONTIN) 300 mg capsule Take 300 mg by mouth three (3) times daily.  oxymetazoline (AFRIN) 0.05 % nasal spray 2 Sprays two (2) times a day.  b complex vitamins (B-COMPLEX) tablet Take 1 Tab by mouth daily. 30 Tab 3    fluticasone (FLONASE) 50 mcg/actuation nasal spray 2 Sprays by Both Nostrils route daily.  albuterol (PROVENTIL HFA, VENTOLIN HFA, PROAIR HFA) 90 mcg/actuation inhaler Take 2 Puffs by inhalation every six (6) hours as needed for Wheezing. No current facility-administered medications on file prior to visit. No Known Allergies    History reviewed. No pertinent family history. Social History     Social History    Marital status: SINGLE     Spouse name: N/A    Number of children: N/A    Years of education: N/A     Occupational History    Not on file. Social History Main Topics    Smoking status: Former Smoker     Packs/day: 2.00     Years: 20.00     Types: Cigarettes     Quit date: 1/1/1980    Smokeless tobacco: Never Used    Alcohol use Yes      Comment: social     Drug use: No    Sexual activity: Not on file     Other Topics Concern    Not on file     Social History Narrative     Blood pressure 150/82, pulse 65, temperature 98.3 °F (36.8 °C), temperature source Oral, resp. rate 20, height 5' 5\" (1.651 m), weight 79.4 kg (175 lb), SpO2 98 %. Ambulatory oximetry per nurse note  Physical Exam   Constitutional: She is oriented to person, place, and time. She appears well-developed and well-nourished. No distress. HENT:   Head: Normocephalic. Nose: Nose normal.   Mouth/Throat: No oropharyngeal exudate. Upper dentures, carious mandibular dentition   Eyes: Conjunctivae and EOM are normal. Pupils are equal, round, and reactive to light. Right eye exhibits no discharge. Left eye exhibits no discharge. No scleral icterus. Neck: No JVD present. No tracheal deviation present. No thyromegaly present.    Cardiovascular: Normal rate, regular rhythm, normal heart sounds and intact distal pulses. Exam reveals no gallop. No murmur heard. Pulmonary/Chest: Effort normal. No stridor. No respiratory distress. She has no wheezes. She has no rales. She exhibits no tenderness. Diminished breath sounds   Abdominal: Soft. She exhibits no mass. There is no tenderness. Musculoskeletal: She exhibits no tenderness. Edema: trace. Lymphadenopathy:     She has no cervical adenopathy. Neurological: She is alert and oriented to person, place, and time. Skin: Skin is warm and dry. No rash noted. She is not diaphoretic. No erythema. Psychiatric: She has a normal mood and affect. Her behavior is normal. Judgment and thought content normal.     CT Results (most recent):    Results from Hospital Encounter encounter on 11/09/16   CT CHEST WO CONT   Narrative CT chest without contrast     HISTORY: Follow-up pulmonary abnormality    COMPARISON: Chest CT 2/17/2016 and 12/28/2015    TECHNIQUE: Helical scans through the chest is obtained from the thoracic inlet  to the diaphragm without IV contrast administration. All CT scans at this facility performed using dose optimization techniques as  appreciated to a performed exam, to include automated exposure control,  adjustment of the mA and or KU according to patient size (including appropriate  matching for site specific examination), or use of iterative reconstruction  technique. FINDINGS: The study is suboptimal due to lack of IV contrast.  Subtle  abnormality can be under detected. Lung Parenchyma: The large cavitary lesion with markedly thickened wall at  medial right upper lung on prior study in 12/15 had being dramatically improved  with patchy groundglass and fibrotic opacities at medial right upper lobe showed  on study 2/17/16. Now near interval resolution noted with only minimal residual  groundglass opacities noted in the region.  Advanced COPD with large bullae in  bilateral upper lungs again noted. The atelectasis in right middle lobe are  partially interval resolved. No acute pulmonary finding. Thyroid: Unremarkable. Mediastinum: Improved borderline lymph node in pretracheal region. No new  adenopathy. Pleural Space And Chest Wall: No significant pleural pathology. Improved chest  wall edema. Heart: The heart and the pericardium appear normal.    Vasculature: The aorta and the great vessels are unremarkable. Imaged Upper Abdomen: Gallstones partially seen. Osseous Structures: Unremarkable for age. Impression IMPRESSION:     1. The fibrotic scarring at the medial right upper lung on prior CT in 2/16  shows near complete interval resolution with only residual minimal groundglass  scarring along current study. 2. Advanced COPD. No acute pulmonary finding. 3. Borderline mediastinal lymph nodes, more likely reactive. 4. Cholelithiasis. Mild chest wall edema. Thank you for your referral.             ASSESSMENT and PLAN  Encounter Diagnoses   Name Primary?  Pulmonary hypertension (HCC) Yes    Bullous emphysema (HCC)     Cavitary pneumonia     Hypoxemia requiring supplemental oxygen     History of asthma      Pt with Pulmonary HTN Grp 1 per treatment history, however pt also with advanced COPD, so possibly Grp 5 instead. Some workup not available so will request outside records. In the meantime, we have titrated supplemental O2 up to 5 LNC with activity as pt desaturated to the 70's on 3 LNC. There was symptomatic response to this dose change. Continue rescue Albuterol and await PFT's to assist in decision on maintenance therapy. I did discuss the possibility of combination inhaler with pt. Will continue current Letairis and Sildenafil until further data is available however if pt is Grp 3 due to COPD would probably not advise above combination. Repeat CT to follow cavitary pneumonia to resolution.   Further intervention pending results of tests above. RTC 2 months.

## 2017-09-12 NOTE — MR AVS SNAPSHOT
Visit Information Date & Time Provider Department Dept. Phone Encounter #  
 9/12/2017 11:15 AM Stephanie Lawrence MD [facility] [id number] Follow-up Instructions Return in about 2 months (around 11/12/2017). Your Appointments 10/11/2017 11:40 AM  
Follow Up with Sriram Steward MD  
[facility] ([facility]) Appt Note: Follow up in 1 year; .  
 [address] [place] [id number] [phone] [address] Upcoming Health Maintenance Date Due Hepatitis C Screening 1955 DTaP/Tdap/Td series (1 - Tdap) 5/5/1974 PAP AKA CERVICAL CYTOLOGY 5/5/1974 FOBT Q 1 YEAR AGE 50-75 5/5/2003 ZOSTER VACCINE AGE 60> 3/5/2013 INFLUENZA AGE 9 TO ADULT 8/1/2017 BREAST CANCER SCRN MAMMOGRAM 2/10/2019 Allergies as of 9/12/2017  Review Complete On: 9/12/2017 By: Stephanie Lawrence MD  
 No Known Allergies Current Immunizations  Reviewed on 11/30/2016 Name Date Influenza Vaccine 9/1/2015 Influenza Vaccine (Quad) PF 1/11/2016  5:14 PM  
 Pneumococcal Conjugate (PCV-13) 5/9/2016 11:30 AM  
 Pneumococcal Polysaccharide (PPSV-23) 1/11/2016  5:25 PM  
  
 Not reviewed this visit You Were Diagnosed With   
  
 Codes Comments Pulmonary hypertension ([facility]    -  Primary ICD-10-CM: I27.2 ICD-9-CM: 416.8 Bullous emphysema ([facility]     ICD-10-CM: J43.9 ICD-9-CM: 492.0 Cavitary pneumonia     ICD-10-CM: J18.9, J98.4 ICD-9-CM: 915, 518.89 Hypoxemia requiring supplemental oxygen     ICD-10-CM: R09.02, Z99.81 ICD-9-CM: 799.02 History of asthma     ICD-10-CM: Z87.09 
ICD-9-CM: V12.69 Vitals BP Pulse Temp Resp Height(growth percentile) Weight(growth percentile)  150/82 (BP 1 Location: Left arm, BP Patient Position: Sitting) 65 98.3 °F (36.8 °C) (Oral) 20 5' 5\" (1.651 m) 175 lb (79.4 kg) SpO2 BMI OB Status Smoking Status 98% 29.12 kg/m2 Postmenopausal Former Smoker BMI and BSA Data Body Mass Index Body Surface Area  
 29.12 kg/m 2 1.91 m 2 Preferred Pharmacy [facility] [address] [phone] Your Updated Medication List  
  
   
This list is accurate as of: 9/12/17 12:35 PM.  Always use your most recent med list.  
  
  
  
  
 albuterol 90 mcg/actuation inhaler Commonly known as:  PROVENTIL HFA, VENTOLIN HFA, PROAIR HFA Take 2 Puffs by inhalation every six (6) hours as needed for Wheezing. albuterol-ipratropium 2.5 mg-0.5 mg/3 ml Nebu Commonly known as:  DUO-NEB  
3 mL by Nebulization route four (4) times daily. Ambrisentan 5 mg tablet Commonly known as:  LETAIRIS Take 5 mg by mouth daily. Indications: PULMONARY ARTERIAL HYPERTENSION  
  
 b complex vitamins tablet Commonly known as:  B-COMPLEX Take 1 Tab by mouth daily. benazepril 20 mg tablet Commonly known as:  LOTENSIN Take 20 mg by mouth daily. DULoxetine 60 mg capsule Commonly known as:  CYMBALTA Take 60 mg by mouth daily. fluticasone 50 mcg/actuation nasal spray Commonly known as:  [place] 2 Sprays by Both Nostrils route daily. * fluticasone-vilanterol 100-25 mcg/dose inhaler Commonly known as:  BREO ELLIPTA Take 1 Puff by inhalation daily. * fluticasone-vilanterol 100-25 mcg/dose inhaler Commonly known as:  BREO ELLIPTA Take 1 Puff by inhalation daily. furosemide 40 mg tablet Commonly known as:  LASIX Take 1 Tab by mouth daily. gabapentin 300 mg capsule Commonly known as:  NEURONTIN Take 300 mg by mouth three (3) times daily. Incontinence Pad, Liner, Disp Pads Commonly known as:  BLADDER CONTROL PADS FOR WOMEN  
1 Each by Does Not Apply route two (2) times a day. OXYGEN-AIR DELIVERY SYSTEMS  
6 L by Does Not Apply route. Uses 4 l when out  
  
 oxymetazoline 0.05 % nasal spray Commonly known as:  AFRIN  
2 Sprays two (2) times a day. pravastatin 20 mg tablet Commonly known as:  PRAVACHOL Take 20 mg by mouth nightly. sildenafil 20 mg tablet Commonly known as:  REVATIO Take 1 Tab by mouth three (3) times daily. * Notice: This list has 2 medication(s) that are the same as other medications prescribed for you. Read the directions carefully, and ask your doctor or other care provider to review them with you. We Performed the Following REFERRAL TO PULMONARY REHAB [ZBC999 Custom] Follow-up Instructions Return in about 2 months (around 11/12/2017). To-Do List   
 09/13/2017 Procedures: AMB POC PFT COMPLETE W/BRONCHODILATOR   
  
 09/13/2017 Procedures: AMB POC PFT COMPLETE W/O BRONCHODILATOR   
  
 09/13/2017 Imaging:  CT CHEST WO CONT   
  
 09/13/2017 Procedures:  DIFFUSING CAPACITY   
  
 09/13/2017 Procedures:  GAS DILUT/WASHOUT LUNG VOL W/WO DISTRIB VENT&VOL Referral Information Referral ID Referred By Ref[facility] To  
  
 [id number] [facility] marjorie Not Available Visits Status Start Date End Date 1 New Request 9/12/17 9/12/18 If your referral has a status of pending review or denied, additional information will be sent to support the outcome of this decision. Introducing [facility] & HEALTH SERVICES! Dear Ernie Martinez: Thank you for requesting a [employer] account. Our records indicate that you already have an active In[employer] account. You can access your account anytime at [email or web address] Did you know that you can access your hospital and ER discharge instructions at any time in In[employer]? You can also review all of your test results from your hospital stay or ER visit. Additional Information If you have questions, please visit the Frequently Asked Questions section of the SumAllhart website at [email or web address] Remember, PeerMe is NOT to be used for urgent needs. For medical emergencies, dial 911. Now available from your iPhone and Android! Please provide this summary of care documentation to your next provider. Your primary care clinician is listed as [address]. If you have any questions after today's visit, please call [phone].

## 2017-09-12 NOTE — PROGRESS NOTES
Chief Complaint   Patient presents with    COPD    Follow-up     former patient of CARLOS last seen 2-16-17     Patient here for follow up visit former patient of DR. GONZALEZ last seen 2-16-17.     PATIENT'S O2 SATS:   88% Room Air Rest, 66 Heart Rate                                          96% O2 @ 4 L Rest, 76 Heart Rate                                          74% O2 @ 4 L Activity, 101 Heart Rate - Patient Walked 330 Ft    PATIENT'S O2 SATS:   98% O2 @ 5 L Rest, 73 Heart Rate                                          93% O2 @ 5 L Activity, 86 Heart Rate - Patient Walked 440 Ft

## 2017-09-18 ENCOUNTER — TELEPHONE (OUTPATIENT)
Dept: PULMONOLOGY | Age: 62
End: 2017-09-18

## 2017-09-18 NOTE — TELEPHONE ENCOUNTER
Pulmonary Rehab called patient and spoke to her about the program. She is interested but wants to see what her insurance will cover. Will follow up with benefit information.     Thank you,  Kiki Galeas

## 2017-09-18 NOTE — TELEPHONE ENCOUNTER
Pulmonary Rehab called patient and left a message about her benefits. Will follow up.     Thank you,  Ana Maria Hendrickson

## 2017-09-29 ENCOUNTER — HOSPITAL ENCOUNTER (OUTPATIENT)
Dept: CT IMAGING | Age: 62
Discharge: HOME | End: 2017-09-29
Attending: INTERNAL MEDICINE
Payer: MEDICAID

## 2017-09-29 DIAGNOSIS — J98.4 CAVITARY PNEUMONIA: ICD-10-CM

## 2017-09-29 DIAGNOSIS — J18.9 CAVITARY PNEUMONIA: ICD-10-CM

## 2017-09-29 PROCEDURE — 71250 CT THORAX DX C-: CPT

## 2017-09-30 ENCOUNTER — TELEPHONE (OUTPATIENT)
Dept: PULMONOLOGY | Age: 62
End: 2017-09-30

## 2017-10-11 ENCOUNTER — OFFICE VISIT (OUTPATIENT)
Dept: CARDIOLOGY CLINIC | Age: 62
End: 2017-10-11

## 2017-10-11 VITALS
SYSTOLIC BLOOD PRESSURE: 110 MMHG | OXYGEN SATURATION: 97 % | HEART RATE: 75 BPM | BODY MASS INDEX: 28.82 KG/M2 | WEIGHT: 173 LBS | HEIGHT: 65 IN | DIASTOLIC BLOOD PRESSURE: 62 MMHG

## 2017-10-11 DIAGNOSIS — I27.20 PULMONARY HYPERTENSION (HCC): ICD-10-CM

## 2017-10-11 DIAGNOSIS — R06.02 SOB (SHORTNESS OF BREATH): ICD-10-CM

## 2017-10-11 DIAGNOSIS — I10 ESSENTIAL HYPERTENSION: Primary | ICD-10-CM

## 2017-10-11 DIAGNOSIS — J44.9 CHRONIC OBSTRUCTIVE PULMONARY DISEASE, UNSPECIFIED COPD TYPE (HCC): ICD-10-CM

## 2017-10-11 NOTE — PROGRESS NOTES
1. Have you been to the ER, urgent care clinic since your last visit? Hospitalized since your last visit? No     2. Have you seen or consulted any other health care providers outside of the [address] [place] [address] since your last visit? Include any pap smears or colon screening.  No

## 2017-10-11 NOTE — MR AVS SNAPSHOT
Visit Information Date & Time Provider Department Dept. Phone Encounter #  
 10/11/2017 11:40 AM Jennifer Farmer MD [facility] [address] [id number] Upcoming Health Maintenance Date Due Hepatitis C Screening 1955 DTaP/Tdap/Td series (1 - Tdap) 5/5/1974 PAP AKA CERVICAL CYTOLOGY 5/5/1974 FOBT Q 1 YEAR AGE 50-75 5/5/2003 ZOSTER VACCINE AGE 60> 3/5/2013 INFLUENZA AGE 9 TO ADULT 8/1/2017 BREAST CANCER SCRN MAMMOGRAM 2/10/2019 Allergies as of 10/11/2017  Review Complete On: 9/12/2017 By: Ashkan Woodward MD  
 No Known Allergies Current Immunizations  Reviewed on 11/30/2016 Name Date Influenza Vaccine 9/1/2015 Influenza Vaccine (Quad) PF 1/11/2016  5:14 PM  
 Pneumococcal Conjugate (PCV-13) 5/9/2016 11:30 AM  
 Pneumococcal Polysaccharide (PPSV-23) 1/11/2016  5:25 PM  
  
 Not reviewed this visit You Were Diagnosed With   
  
 Codes Comments SOB (shortness of breath)    -  Primary ICD-10-CM: R06.02 
ICD-9-CM: 786.05 Essential hypertension     ICD-10-CM: I10 
ICD-9-CM: 401.9 Vitals BP Pulse Height(growth percentile) Weight(growth percentile) SpO2 BMI  
 110/62 75 5' 5\" (1.651 m) 173 lb (78.5 kg) 97% 28.79 kg/m2 OB Status Smoking Status Postmenopausal Former Smoker Vitals History BMI and BSA Data Body Mass Index Body Surface Area 28.79 kg/m 2 1.9 m 2 Preferred Pharmacy Pharmacy Name Phone [id number] [address] [phone] Your Updated Medication List  
  
   
This list is accurate as of: 10/11/17 12:15 PM.  Always use your most recent med list.  
  
  
  
  
 albuterol 90 mcg/actuation inhaler Commonly known as:  PROVENTIL HFA, VENTOLIN HFA, PROAIR HFA Take 2 Puffs by inhalation every six (6) hours as needed for Wheezing. albuterol-ipratropium 2.5 mg-0.5 mg/3 ml Nebu Commonly known as:  DUO-NEB  
3 mL by Nebulization route four (4) times daily. Ambrisentan 5 mg tablet Commonly known as:  LETAIRIS Take 5 mg by mouth daily. Indications: PULMONARY ARTERIAL HYPERTENSION  
  
 b complex vitamins tablet Commonly known as:  B-COMPLEX Take 1 Tab by mouth daily. benazepril 20 mg tablet Commonly known as:  LOTENSIN Take 20 mg by mouth daily. DULoxetine 60 mg capsule Commonly known as:  CYMBALTA Take 60 mg by mouth daily. fluticasone 50 mcg/actuation nasal spray Commonly known as:  [place] 2 Sprays by Both Nostrils route daily. * fluticasone-vilanterol 100-25 mcg/dose inhaler Commonly known as:  BREO ELLIPTA Take 1 Puff by inhalation daily. * fluticasone-vilanterol 100-25 mcg/dose inhaler Commonly known as:  BREO ELLIPTA Take 1 Puff by inhalation daily. furosemide 40 mg tablet Commonly known as:  LASIX Take 1 Tab by mouth daily. gabapentin 300 mg capsule Commonly known as:  NEURONTIN Take 300 mg by mouth three (3) times daily. Incontinence Pad, Liner, Disp Pads Commonly known as:  BLADDER CONTROL PADS FOR WOMEN  
1 Each by Does Not Apply route two (2) times a day. OXYGEN-AIR DELIVERY SYSTEMS  
6 L by Does Not Apply route. Uses 4 l when out  
  
 oxymetazoline 0.05 % nasal spray Commonly known as:  AFRIN  
2 Sprays two (2) times a day. pravastatin 20 mg tablet Commonly known as:  PRAVACHOL Take 20 mg by mouth nightly. sildenafil 20 mg tablet Commonly known as:  REVATIO Take 1 Tab by mouth three (3) times daily. * Notice: This list has 2 medication(s) that are the same as other medications prescribed for you. Read the directions carefully, and ask your doctor or other care provider to review them with you. We Performed the Following AMB POC EKG ROUTINE W/ 12 LEADS, INTER & REP [50888 CPT(R)] Introducing [facility] SERVICES!    
 Dear Lorne Paez: 
 Thank you for requesting a evly account. Our records indicate that you already have an active evly account. You can access your account anytime at [email or web address] Did you know that you can access your hospital and ER discharge instructions at any time in evly? You can also review all of your test results from your hospital stay or ER visit. Additional Information If you have questions, please visit the Frequently Asked Questions section of the evly website at [email or web address] Remember, evly is NOT to be used for urgent needs. For medical emergencies, dial 911. Now available from your iPhone and Android! Please provide this summary of care documentation to your next provider. Your primary care clinician is listed as [address]. If you have any questions after today's visit, please call [phone].

## 2017-10-11 NOTE — PROGRESS NOTES
HISTORY OF PRESENT ILLNESS  Krystian Hernandez is a 57 y.o. female. Hypertension   Associated symptoms include shortness of breath. Pertinent negatives include no chest pain, no abdominal pain and no headaches. Shortness of Breath   Associated symptoms include leg swelling. Pertinent negatives include no fever, no headaches, no cough, no wheezing, no PND, no orthopnea, no chest pain, no vomiting, no abdominal pain, no rash and no claudication. Patient presents for a follow-up office visit. She does not have a prior history of CAD. She has a history of hypertension, severe COPD, now oxygen dependent and severe secondary pulmonary hypertension managed with vasodilators by her pulmonologist.    The patient was last seen in the office approximately 13 months ago. She continues to take Lasix 40 mg daily. She remains on oxygen 24 hours a day at 4 L nasal cannula. She denies any major change in her shortness of breath, leg swelling, or activity tolerance over the past year. No new chest pain, no palpitations, dizziness or syncope. Past Medical History:   Diagnosis Date    LALO (acute kidney injury) ([address] 12/27/2015    ARF (acute respiratory failure) ([address] 12/27/2015    Asthma     Chronic lung disease     COPD (chronic obstructive pulmonary disease) (HCC)     Dependence on continuous supplemental oxygen     Diastolic heart failure (HCC)     Echocardiogram abnormal 12/29/2015    Hyperdynamic. EF 75%. No WMA. Gr 1 DDfx. Mild RVE. RVSP 60-70 mmHg (prev 120 mmHg on 10/1/15). Mild LAE. Mod-severe ELISHA. Mild-mod TR.  Gastric wall thickening 2009    Chronic Proximal Gastric Wall Calcifications.      Hyperlipidemia     Hypertension     ANNELIESE (obstructive sleep apnea)     Pulmonary arterial hypertension     Pulmonary hypertension     RVSP 120 mm Hg, cor pulmonale (echo Oct 2015)    Right upper lobe pneumonia ([address] 12/27/2015    Cavitary RUL Pneumonia    TB lung, latent 1986 Diagnosed/Treated.  Thallium stress test 11/12/2002    No prior infarction. Minimal to mild apical ischemia vs apical thinning. RVE.  Tuberculosis      Current Outpatient Prescriptions   Medication Sig Dispense Refill    OXYGEN-AIR DELIVERY SYSTEMS 6 L by Does Not Apply route. Uses 4 l when out      sildenafil (REVATIO) 20 mg tablet Take 1 Tab by mouth three (3) times daily. 90 Tab 5    furosemide (LASIX) 40 mg tablet Take 1 Tab by mouth daily. 30 Tab 3    fluticasone-vilanterol (BREO ELLIPTA) 100-25 mcg/dose inhaler Take 1 Puff by inhalation daily. 1 Inhaler 0    fluticasone-vilanterol (BREO ELLIPTA) 100-25 mcg/dose inhaler Take 1 Puff by inhalation daily. 1 Inhaler 3    DULoxetine (CYMBALTA) 60 mg capsule Take 60 mg by mouth daily.  pravastatin (PRAVACHOL) 20 mg tablet Take 20 mg by mouth nightly.  gabapentin (NEURONTIN) 300 mg capsule Take 300 mg by mouth three (3) times daily.  Incontinence Pad, Liner, Disp (BLADDER CONTROL PADS FOR WOMEN) pads 1 Each by Does Not Apply route two (2) times a day. 54 Each 0    Ambrisentan (LETAIRIS) 5 mg tablet Take 5 mg by mouth daily. Indications: PULMONARY ARTERIAL HYPERTENSION 30 Tab 5    oxymetazoline (AFRIN) 0.05 % nasal spray 2 Sprays two (2) times a day.  b complex vitamins (B-COMPLEX) tablet Take 1 Tab by mouth daily. 30 Tab 3    fluticasone (FLONASE) 50 mcg/actuation nasal spray 2 Sprays by Both Nostrils route daily.  albuterol (PROVENTIL HFA, VENTOLIN HFA, PROAIR HFA) 90 mcg/actuation inhaler Take 2 Puffs by inhalation every six (6) hours as needed for Wheezing.  albuterol-ipratropium (DUO-NEB) 2.5 mg-0.5 mg/3 ml nebulizer solution 3 mL by Nebulization route four (4) times daily. 120 Vial 5    benazepril (LOTENSIN) 20 mg tablet Take 20 mg by mouth daily.        No Known Allergies     Social History   Substance Use Topics    Smoking status: Former Smoker     Packs/day: 2.00     Years: 20.00     Types: Cigarettes     Quit date: 1/1/1980    Smokeless tobacco: Never Used    Alcohol use Yes      Comment: social          Review of Systems   Constitutional: Negative for chills, fever and weight loss. HENT: Negative for nosebleeds. Eyes: Negative for blurred vision and double vision. Respiratory: Positive for shortness of breath. Negative for cough and wheezing. Cardiovascular: Positive for leg swelling. Negative for chest pain, palpitations, orthopnea, claudication and PND. Gastrointestinal: Negative for abdominal pain, heartburn, nausea and vomiting. Genitourinary: Negative for dysuria and hematuria. Musculoskeletal: Negative for falls and myalgias. Skin: Negative for rash. Neurological: Negative for dizziness, focal weakness and headaches. Endo/Heme/Allergies: Does not bruise/bleed easily. Psychiatric/Behavioral: Negative for substance abuse. Visit Vitals    /62    Pulse 75    Ht 5' 5\" (1.651 m)    Wt 78.5 kg (173 lb)    SpO2 97%    BMI 28.79 kg/m2       Physical Exam   Constitutional: She is oriented to person, place, and time. She appears well-developed and well-nourished. HENT:   Head: Normocephalic and atraumatic. Eyes: Conjunctivae are normal.   Neck: Neck supple. No JVD present. Carotid bruit is not present. Cardiovascular: Normal rate, regular rhythm, S1 normal, S2 normal and normal pulses. Exam reveals no gallop. No murmur heard. Pulmonary/Chest: Effort normal and breath sounds normal. She has no wheezes. She has no rales. Abdominal: Soft. Bowel sounds are normal. There is no tenderness. Musculoskeletal: She exhibits edema. Trace   Neurological: She is alert and oriented to person, place, and time. Skin: Skin is warm and dry. EKG: Normal sinus rhythm, normal axis, normal QTc interval, nonspecific T-wave inversions in leads V1 and V2 likely from RV strain, poor R-wave progression. No change compared to the previous EKG.     ASSESSMENT and PLAN    Severe secondary pulmonary hypertension. This is followed closely by pulmonology. She is managed with vasodilators  No significant change in her shortness of breath. Hypertension. Patient's blood pressure appears reasonably well controlled on her current medical regimen. Lower extremity swelling. I suspect this is related to her chronic RV dysfunction from cor pulmonale. This has been controlled with Lasix 40 mg daily. Her leg swelling is unchanged today and minimal at best.  I would her current diuretic regimen.     Follow-up annually, sooner if needed

## 2017-11-02 ENCOUNTER — TELEPHONE (OUTPATIENT)
Dept: PULMONOLOGY | Age: 62
End: 2017-11-02

## 2017-11-02 NOTE — TELEPHONE ENCOUNTER
Burt from [place] [id number] called because Dr. Mary Silva is not enrolled in the program for letairis so the rx cannot be filled. He said that he would send over the forms. Should we get another doc to try to write it or change the meds?  Please call [phone] or fax [phone]

## 2017-11-02 NOTE — TELEPHONE ENCOUNTER
Nolberto Mcgrath is contacted and will send us a form for all MDs to enroll and another form for the pt. which only needs Dr. Marleny Olivas and date. No need to fill out the prescription aspect, etc..

## 2017-11-03 ENCOUNTER — TELEPHONE (OUTPATIENT)
Dept: PULMONOLOGY | Age: 62
End: 2017-11-03

## 2017-11-03 NOTE — TELEPHONE ENCOUNTER
[facility] called patient and spoke to her about the program. She is still interested but would like a call back in about a month when she is closer to getting Medicare as her primary insurance. Will follow up at that point.      Thank you,  Serena Jeffers

## 2017-11-28 ENCOUNTER — OFFICE VISIT (OUTPATIENT)
Dept: PULMONOLOGY | Age: 62
End: 2017-11-28

## 2017-11-28 VITALS
WEIGHT: 174 LBS | OXYGEN SATURATION: 98 % | HEIGHT: 65 IN | RESPIRATION RATE: 23 BRPM | SYSTOLIC BLOOD PRESSURE: 140 MMHG | DIASTOLIC BLOOD PRESSURE: 80 MMHG | BODY MASS INDEX: 28.99 KG/M2 | TEMPERATURE: 98.2 F | HEART RATE: 72 BPM

## 2017-11-28 DIAGNOSIS — J43.9 BULLOUS EMPHYSEMA (HCC): ICD-10-CM

## 2017-11-28 DIAGNOSIS — Z99.81 HYPOXEMIA REQUIRING SUPPLEMENTAL OXYGEN: ICD-10-CM

## 2017-11-28 DIAGNOSIS — J44.9 COPD, SEVERE (HCC): Primary | ICD-10-CM

## 2017-11-28 DIAGNOSIS — I27.20 PULMONARY HYPERTENSION (HCC): ICD-10-CM

## 2017-11-28 DIAGNOSIS — R09.02 HYPOXEMIA REQUIRING SUPPLEMENTAL OXYGEN: ICD-10-CM

## 2017-11-28 RX ORDER — BUDESONIDE AND FORMOTEROL FUMARATE DIHYDRATE 80; 4.5 UG/1; UG/1
1 AEROSOL RESPIRATORY (INHALATION) 2 TIMES DAILY
Qty: 1 INHALER | Refills: 5 | Status: SHIPPED | OUTPATIENT
Start: 2017-11-28 | End: 2018-07-11 | Stop reason: SDUPTHER

## 2017-11-28 RX ORDER — ALBUTEROL SULFATE 90 UG/1
2 AEROSOL, METERED RESPIRATORY (INHALATION)
Qty: 1 INHALER | Refills: 5 | Status: SHIPPED | OUTPATIENT
Start: 2017-11-28 | End: 2020-01-08 | Stop reason: SDUPTHER

## 2017-11-28 RX ORDER — BUDESONIDE AND FORMOTEROL FUMARATE DIHYDRATE 80; 4.5 UG/1; UG/1
2 AEROSOL RESPIRATORY (INHALATION) 2 TIMES DAILY
Qty: 1 INHALER | Refills: 0 | Status: SHIPPED | COMMUNITY
Start: 2017-11-28 | End: 2019-08-06 | Stop reason: SDUPTHER

## 2017-11-28 NOTE — PROGRESS NOTES
[facility]    [address] [place] [address]      SIMPLE PULMONARY STRESS TEST - 6 MINUTE WALK    PATIENT NAME: Kat Hernandez    DATE: 11/28/2017     YOB: 1955     AGE: 57 y.o. DIAGNOSIS:   Encounter Diagnoses   Name Primary?  Pulmonary hypertension     Bullous emphysema (HCC)          TECHNICIAN: Juancho Barron, RT         PHYSICIAN: Dr Franklyn Campbell MD      Visit Vitals    /80 (BP 1 Location: Left arm, BP Patient Position: At rest)    Pulse 72    Temp 98.2 °F (36.8 °C) (Oral)    Resp 23    Ht 5' 5\" (1.651 m)    Wt 174 lb (78.9 kg)    SpO2 98%  Comment: on 5/lpm nasal cannula    BMI 28.96 kg/m2        RESTING DATA:  Dyspnea Scale (1-10):    3 SOB    EXERCISE DATA:   6 MINUTE WALK - HALLWAY (34 METERS)      1   RA    76 % SAT   85 HR     3 SOB    1 Laps x 34m = 34m  2   5L    95 % SAT   74 HR     3 SOB    1 Laps x 34m = 34m  3   5L    92 % SAT   74 HR     3 SOB    1 Laps x 34m = 34m  4   5L    84 % SAT   100 HR   3 SOB    1 Laps x 34m = 34m  5   6L    92 % SAT   93 HR     3 SOB    1 Laps x 34m = 34m  6   6L    90 % SAT   102 HR   3 SOB    1 Laps x 34m = 34m    TOTAL DISTANCE:   204 M    RECOVERY DATA:      1   6L    88 % SAT   84 HR   25 RR   150/80 BP   4 SOB  2   5L    98 % SAT   72 HR   23 RR   140/80 BP   3 SOB      TECHNICIAN COMMENTS: Patient began 6 minute walk on room air and 02 saturation level while on room air immediately decreased to 76%. Patient placed on original setting of 5/lpm and 02 saturation level increased to 95% with a gradual decrease over 2 minutes to a low of 92%. Within 3 minutes 02 saturation level on 5/lpm decreased to 84% while heart rate increased to 100 bpm. 02 liter flow increased to 6/lpm, and 02 saturation level increased to 92% and decreased to 90% after 2 minutes.  Upon resting 02 saturation level decreased to 88% but quickly began increasing to high 90's, 02 liter decreased back to original setting of 5/lpm and 02 saturation remained stable at 98 - 99%. HR also decreased to 73 bpm. No distress observed or voiced by patient.

## 2017-11-28 NOTE — PROGRESS NOTES
HISTORY OF PRESENT ILLNESS  Nikolas Lozoya is a 57 y.o. female. HPI Comments: COPD and Pulmonary HTN, last PASP 60-70 mm Hg. Previously followed by Dr. Eryn Coyne  Patient is a 59 y. o.AA female with severe COPD and Pulmonary HTN with hypoxemia needing home oxygen with low O2 sats at baseline. She was started on Letairis 5 mg daily. She was admitted to [facility] end of Dec 2015 with respiratory failure, worsened hypoxemia, cough, blood streaked sputum. CT chest and CXR showing new RT upper lobe pneumonia and cavitation. Due to past hx of +ve PPD with INH treatment, she was in respiratory isolation. She needed to be in icu with need for BIPAP and high flow oxygen. She was taken off respiratory isolation after 3 sputum samples with negative stain for AFB. She also underwent a bronchoscopy and BAL in the icu on 1/4/16. 2 sputum samples have shown ELSA and patient started on triple antimycobacterial therapy since 2/25/16 . She took triple therapy for 5 months and due to loss of insurance coverage, patient was unable to get medications since Jul 2016. CT chest Nov 2016 shows significant improvement suggesting that the right upper lobe cavitary pneumonia was bacterial. Patient was subsequently discharged on home O2 at 7-10 lits concentration. Patient oxygenation has improved steadily and is now down to 3 lits nc O2 at home at rest and 4 lits on exertion. Pt does complain of some SOB on mildexertion. Denies chest pain or hemoptysis. No fever or chills. Pt notes episodic SOB with wheezing, sometimes triggered by dust, fumes and strong smells, treated with Albuterol rescue inhaler. Pt was previously on Breo but ran out because of formulary change.          Review of Systems   Constitutional: Positive for malaise/fatigue. Negative for chills, diaphoresis, fever and weight loss. HENT: Negative for congestion, ear discharge, ear pain, hearing loss, nosebleeds, sinus pain, sore throat and tinnitus.     Eyes: Negative for blurred vision, double vision, photophobia, pain, discharge and redness. Respiratory: Positive for cough and shortness of breath. Negative for hemoptysis, sputum production and stridor. Wheezing: rare. Cardiovascular: Positive for leg swelling (intermittent). Negative for palpitations, orthopnea, claudication and PND. Gastrointestinal: Negative for blood in stool, constipation, diarrhea, heartburn, melena, nausea and vomiting. Genitourinary: Negative for dysuria, flank pain, frequency, hematuria and urgency. Musculoskeletal: Positive for joint pain. Negative for back pain, falls, myalgias and neck pain. Skin: Negative for itching and rash. Neurological: Negative for dizziness, tingling, tremors, sensory change, speech change, focal weakness, seizures, loss of consciousness and weakness. Endo/Heme/Allergies: Negative for environmental allergies and polydipsia. Does not bruise/bleed easily. Psychiatric/Behavioral: Negative for depression, hallucinations, memory loss, substance abuse and suicidal ideas. The patient is not nervous/anxious and does not have insomnia. Past Medical History:   Diagnosis Date    [facility]ALO (acute [facility]) ([facility] 12/27/2015    ARF (acute respiratory failure) ([facility] 12/27/2015    Asthma     Chronic lung disease     [facility]     Dependence on continuous supplemental oxygen     Diastolic heart failure ([facility])     Echocardiogram abnormal 12/29/2015    Hyperdynamic. EF 75%. No WMA. Gr 1 DDfx. Mild RVE. RVSP 60-70 mmHg (prev 120 mmHg on 10/1/15). Mild LAE. Mod-severe ELISHA. Mild-mod TR.  Gastric wall thickening 2009    Chronic Proximal Gastric Wall Calcifications.      Hyperlipidemia     Hypertension     ANNELIESE (obstructive sleep apnea)     Pulmonary arterial hypertension     Pulmonary hypertension     RVSP 120 mm Hg, cor pulmonale (echo Oct 2015)    Right upper lobe pneumonia ([facility] 12/27/2015    Cavitary RUL Pneumonia    TB lung, latent 1986    Diagnosed/Treated.  Thallium stress test 11/12/2002    No prior infarction. Minimal to mild apical ischemia vs apical thinning. RVE.  Tuberculosis      Past Surgical History:   Procedure Laterality Date    BRONCHOSCOPY  01/04/2016    Dr. Munoz Lobe HX DILATION AND CURETTAGE      HX GASTRIC BYPASS      HX HYSTERECTOMY      HX ORTHOPAEDIC      bone spur    HX TONSILLECTOMY      HX TUBAL LIGATION       Current Outpatient Prescriptions on File Prior to Visit   Medication Sig Dispense Refill    OXYGEN-AIR DELIVERY SYSTEMS 6 L by Does Not Apply route. Uses 4 l when out      sildenafil (REVATIO) 20 mg tablet Take 1 Tab by mouth three (3) times daily. 90 Tab 5    furosemide (LASIX) 40 mg tablet Take 1 Tab by mouth daily. 30 Tab 3    pravastatin (PRAVACHOL) 20 mg tablet Take 20 mg by mouth nightly.  Ambrisentan (LETAIRIS) 5 mg tablet Take 5 mg by mouth daily. Indications: PULMONARY ARTERIAL HYPERTENSION 30 Tab 5    oxymetazoline (AFRIN) 0.05 % nasal spray 2 Sprays two (2) times a day.  albuterol-ipratropium (DUO-NEB) 2.5 mg-0.5 mg/3 ml nebulizer solution 3 mL by Nebulization route four (4) times daily. 120 Vial 5    benazepril (LOTENSIN) 20 mg tablet Take 20 mg by mouth daily.  DULoxetine (CYMBALTA) 60 mg capsule Take 60 mg by mouth daily.  gabapentin (NEURONTIN) 300 mg capsule Take 300 mg by mouth three (3) times daily.  b complex vitamins (B-COMPLEX) tablet Take 1 Tab by mouth daily. 30 Tab 3    fluticasone (FLONASE) 50 mcg/actuation nasal spray 2 Sprays by Both Nostrils route daily. No current facility-administered medications on file prior to visit. No Known Allergies    History reviewed. No pertinent family history.   Social History     Social History    Marital status: SINGLE     Spouse name: N/A    Number of children: N/A    Years of education: N/A     Occupational History    Not on file.     Social History Main Topics    Smoking status: Former Smoker     Packs/day: 2.00     Years: 20.00     Types: Cigarettes     Quit date: 1/1/1990    Smokeless tobacco: Never Used    Alcohol use Yes      Comment: social     Drug use: No    Sexual activity: Not on file     Other Topics Concern    Not on file     Social History Narrative     Blood pressure 140/80, pulse 72, temperature 98.2 °F (36.8 °C), temperature source Oral, resp. rate 23, height 5' 5\" (1.651 m), weight 78.9 kg (174 lb), SpO2 98 %. Ambulatory oximetry per nurse note  Physical Exam   Constitutional: She is oriented to person, place, and time. She appears well-developed and well-nourished. No distress. HENT:   Head: Normocephalic. Nose: Nose normal.   Mouth/Throat: No oropharyngeal exudate. Upper dentures, carious teeth   Eyes: Conjunctivae and EOM are normal. Pupils are equal, round, and reactive to light. Right eye exhibits no discharge. Left eye exhibits no discharge. No scleral icterus. Neck: No JVD present. No tracheal deviation present. No thyromegaly present. Cardiovascular: Normal rate, regular rhythm, normal heart sounds and intact distal pulses. Exam reveals no gallop. No murmur heard. Pulmonary/Chest: Effort normal. No stridor. No respiratory distress. She has no wheezes. She has no rales. She exhibits no tenderness. Diminished breath sounds   Abdominal: Soft. She exhibits no mass. There is no tenderness. Musculoskeletal: She exhibits no tenderness. Edema: trace. Lymphadenopathy:     She has no cervical adenopathy. Neurological: She is alert and oriented to person, place, and time. Skin: Skin is warm and dry. No rash noted. She is not diaphoretic. No erythema. Psychiatric: She has a normal mood and affect.  Her behavior is normal. Judgment and thought content normal.     CT Results (most recent):    Results from Hospital Encounter encounter on 09/29/17   CT CHEST WO CONT   Narrative CT scan of chest,[without] intravenous contrast:        INDICATION:    [Cavitary pneumonia. History of hypertension, COPD, diastolic heart failure, and pulmonary  hypertension. TECHNIQUE:    Multidetector CT scan with 5 mm slice thickness, without intravenous contrast,  including chest and subphrenic areas. 2-D sagittal and coronal images reformatted. All CT scans at this facility are performed using dose optimization technique as  appropriate to a  performed  examination, to include automated exposer control,  adjustment mA and / or  KV according to patient size (including appropriate  matching  for site specific examination), or use  of iterative  reconstruction  technique. COMPARISON STUDY: CT scan of chest on 11/9/2016.  --------------------------------------------------------------------    FINDINGS:        Thoracic inlet and supraclavicular areas: No diagnostic finding. Aortopulmonary window area:[ No diagnostic finding.]    Right paratracheal  area: [In the precarinal location there is a lymph node  measuring 2.2 cm x 1.26 cm, unchanged as compared to previous study. Alessandro Armijo ]    Subcarinal area,:[ No diagnostic finding.]   Right pulmonary hilum:[ No diagnostic finding.] There are few small calcified  lymph nodes at right pulmonary hilum. Left pulmonary hilum:[ No diagnostic finding.] Few small calcified lymph nodes  at the left pulmonary hilum. Thoracic aorta: The diameter of ascending thoracic aorta is 2.5 cm. Mild-to-moderate calcified plaques in arch of aorta. Pulmonary arterial system: [Right pulmonary artery measures 2.55 cm in diameter. The left pulmonary artery measures 2.9 cm in diameter. The study is noncontrast  study. ----------------------------------------------------------  Lung parenchyma, bilateral:        The study shows marked pulmonary emphysema in upper lobes of both lungs, right  greater than left. There are bullous emphysematous changes in right upper lobe.   In the middle zones of both lungs there are moderate emphysematous changes. In  the lower zones of both lung there are mild to moderate emphysematous changes  noted. The study also demonstrates mild generalized interstitial fibrotic changes in  both lungs. At the medial aspect of right upper lobe, right perihilar area and at the  posterior aspect of posterior segment of right upper lobe there are focal  fibrotic changes, similar to previous study. In the lower portion of right  middle lobe there are moderate fibrotic/chronic atelectatic changes, which are  similar to previous study. At the upper anterior aspect of right lower lobe there are mild focal discoid  atelectatic changes and fibrotic changes. In rest of right lower lobe there is  no other focal abnormality or acute process identified. There are emphysematous changes in left upper lobe. At the posterior aspect of  base of left upper lobe there are mild focal fibrotic or minimal chronic  atelectatic changes noted. In the lower lingula of left lung there are focal  patchy fibrotic/atelectatic changes, similar to previous study. In anterior segment of left lower lobe there are focal mild bullous  emphysematous changes with surrounding mild fibrotic changes, similar to  previous study. There is no definable pneumonic infiltrates or confluent  atelectasis in lungs at this time. There is no definable lung abscess or cavitary pneumonia. There is no finding suspicious for acute pulmonary tuberculosis.    ---------------------------------------------------------------  Pleural spaces:[ No evidence of pleural effusion]  . [ No evidence of pleural thickening or pleural plaque formation or pleural  calcification. No evidence of pneumothorax. Bony thorax:[Mild to moderate multilevel spondylosis in thoracic spine. Visualized portion of liver:[ Unremarkable.]    Gallbladder: There are gallstones in visualized upper portion of gallbladder.     Spleen: [ No diagnostic Finding]  Tiny calcified granulomas. Adrenal glands : [no diagnostic finding]    Any other pertinent finding in upper abdomen:[ None.]    Hiatal hernia:[ Small hiatal hernia. Evidence of previous gastric bypass  surgery. .]    Esophagus: [ No obvious diagnostic finding]    Soft tissues of chest wall: No diagnostic finding.    ---------------------------------------------------------------       Impression IMPRESSION:    1. No evidence of cavitary pneumonia or any other type of pneumonia or acute  process in lungs at this time. 2.  No evidence of active pulmonary tuberculosis. 3.  Redemonstration of marked pulmonary emphysema in upper lungs bilaterally, right  greater than left. Mild-to-moderate pulmonary emphysema in mid and lower lungs  bilaterally, as before. 4.  There are scattered fibrotic changes in both lungs, similar to previous study. 5.  No evidence of discrete mass or nodule in either lung. 6.  No evidence of pathologic lymphadenopathy or interval change in mediastinum. 7.  Cholelithiasis. 9.  Additional findings as above in body of report. ASSESSMENT and PLAN  Encounter Diagnoses   Name Primary?  COPD, severe ([facility] Yes    Pulmonary hypertension     Bullous emphysema (HCC)     Hypoxemia requiring supplemental oxygen      Pt with Pulmonary HTN Grp 1 per treatment history, however pt also with advanced COPD, so possibly Grp 5 instead. Some workup not available so will request outside records. In the meantime, would continue current medications including O2 dose. Reviewed PFT's with pt. Continue rescue Albuterol and start LABA/ICS and LAMA. Inhaler technique taught to pt. Will continue current Letairis and Sildenafil until further data is available. Refer for transplant evaluation. CT results reviewed with pt, no evidence of cavitary lung disease.     RTC 4 months

## 2017-11-28 NOTE — MR AVS SNAPSHOT
Visit Information Date & Time Provider Department Dept. Phone Encounter #  
 11/28/2017 11:45 AM MD Citlali Vizcarra [facility] [id number] Follow-up Instructions Return in about 4 months (around 3/28/2018). Routing History Follow-up and Disposition History Your Appointments 10/12/2018 10:40 AM  
Follow Up with Linda Carrel, MD  
[facility] ([address] [place] [address]) Appt Note: 1 year [facility] up [facility] [address] [address] [address] [phone] [address] [place] [address] [address] Upcoming Health Maintenance Date Due Hepatitis C Screening 1955 DTaP/Tdap/Td series (1 - Tdap) 5/5/1974 PAP AKA CERVICAL CYTOLOGY 5/5/1974 FOBT Q 1 YEAR AGE 50-75 5/5/2003 ZOSTER VACCINE AGE 60> 3/5/2013 Influenza Age 5 to Adult 8/1/2017 BREAST CANCER SCRN MAMMOGRAM 2/10/2019 Allergies as of 11/28/2017  Review Complete On: 11/28/2017 By: Carol Menard MD  
 No Known Allergies Current Immunizations  Reviewed on 11/30/2016 Name Date Influenza Vaccine 9/1/2015 Influenza Vaccine (Quad) PF 1/11/2016  5:14 PM  
 Pneumococcal Conjugate (PCV-13) 5/9/2016 11:30 AM  
 Pneumococcal Polysaccharide (PPSV-23) 1/11/2016  5:25 PM  
  
 Not reviewed this visit You Were Diagnosed With   
  
 Codes Comments COPD, severe ([facility] [address]    -  Primary ICD-10-CM: J44.9 ICD-9-CM: 074 Pulmonary hypertension     ICD-10-CM: I27.20 ICD-9-CM: 416.8 Bullous emphysema ([facility] [address]     ICD-10-CM: J43.9 ICD-9-CM: 492.0 Hypoxemia requiring supplemental oxygen     ICD-10-CM: R09.02, Z99.81 ICD-9-CM: 799.02 Vitals BP Pulse Temp Resp Height(growth percentile) Weight(growth percentile) 140/80 (BP 1 Location: Left arm, BP Patient Position: At rest) 72 98.2 °F (36.8 °C) (Oral) 23 5' 5\" (1.651 m) 174 lb (78.9 kg) SpO2 BMI OB Status Smoking Status 98% 28.96 kg/m2 Postmenopausal Former Smoker BMI and BSA Data Body Mass Index Body Surface Area  
 28.96 kg/m 2 1.9 m 2 Preferred Pharmacy Pharmacy Name Phone [facility] [address] [phone] Your Updated Medication List  
  
   
This list is accurate as of: 11/28/17 12:21 PM.  Always use your most recent med list.  
  
  
  
  
 albuterol 90 mcg/actuation inhaler Commonly known as:  PROVENTIL HFA, VENTOLIN HFA, PROAIR HFA Take 2 Puffs by inhalation every four (4) hours as needed for Wheezing or Shortness of Breath. albuterol-ipratropium 2.5 mg-0.5 mg/3 ml Nebu Commonly known as:  DUO-NEB  
3 mL by Nebulization route four (4) times daily. Ambrisentan 5 mg tablet Commonly known as:  LETAIRIS Take 5 mg by mouth daily. Indications: PULMONARY ARTERIAL HYPERTENSION  
  
 b complex vitamins tablet Commonly known as:  B-COMPLEX Take 1 Tab by mouth daily. benazepril 20 mg tablet Commonly known as:  LOTENSIN Take 20 mg by mouth daily. * budesonide-formoterol 80-4.5 mcg/actuation Hfaa Commonly known as:  SYMBICORT Take 2 Puffs by inhalation two (2) times a day. * budesonide-formoterol 80-4.5 mcg/actuation Hfaa Commonly known as:  SYMBICORT Take 1 Puff by inhalation two (2) times a day. DULoxetine 60 mg capsule Commonly known as:  CYMBALTA Take 60 mg by mouth daily. fluticasone 50 mcg/actuation nasal spray Commonly known as:  Lella Solum 2 Sprays by Both Nostrils route daily. furosemide 40 mg tablet Commonly known as:  LASIX Take 1 Tab by mouth daily. gabapentin 300 mg capsule Commonly known as:  NEURONTIN Take 300 mg by mouth three (3) times daily. OXYGEN-AIR DELIVERY SYSTEMS  
6 L by Does Not Apply route. Uses 4 l when out  
  
 oxymetazoline 0.05 % nasal spray Commonly known as:  Rachele Limb  
 2 Sprays two (2) times a day. pravastatin 20 mg tablet Commonly known as:  PRAVACHOL Take 20 mg by mouth nightly. sildenafil 20 mg tablet Commonly known as:  REVATIO Take 1 Tab by mouth three (3) times daily. tiotropium 18 mcg inhalation capsule Commonly known as:  [address] Take 1 Cap by inhalation daily. * Notice: This list has 2 medication(s) that are the same as other medications prescribed for you. Read the directions carefully, and ask your doctor or other care provider to review them with you. Prescriptions Sent to Pharmacy Refills  
 budesonide-formoterol (SYMBICORT) 80-4.5 mcg/actuation HFAA 5 Sig: Take 1 Puff by inhalation two (2) times a day. Class: Normal  
 Pharmacy: [facility] [place] [address]. Ph #: [phone] Route: Inhalation  
 tiotropium (SPIRIVA WITH HANDIHALER) 18 mcg inhalation capsule 5 Sig: Take 1 Cap by inhalation daily. Class: Normal  
 Pharmacy: [address] [place] [address]. Ph #: [phone] Route: Inhalation  
 albuterol (PROVENTIL HFA, VENTOLIN HFA, PROAIR HFA) 90 mcg/actuation inhaler 5 Sig: Take 2 Puffs by inhalation every four (4) hours as needed for Wheezing or Shortness of Breath. Class: Normal  
 Pharmacy: [address] [place] [address]. Rd[address] [place] [address]. Ph #: [phone] Route: Inhalation We Performed the Following AMB POC PFT COMPLETE W/BRONCHODILATOR [51229 CPT(R)] AMB POC PULMONARY STRESS TESTING,SIMPLE [46710 CPT(R)] DIFFUSING CAPACITY [id number] CPT(R)] GAS DILUT/WASHOUT LUNG VOL W/WO DISTRIB VENT&VOL [36212 CPT(R)] REFERRAL TO TRANSPLANT SURGERY [DZV807 Custom] Comments:  
 Please evaluate for possible lung transplant. COPD and pulmonary HTN Follow-up Instructions Return in about 4 months (around 3/28/2018). To-Do List   
 11/28/2017 Procedures: AMB POC PULMONARY STRESS TESTING,SIMPLE Referral Information Referral ID Referred By Referred To  
  
 [id number] José Manuel Estimable Melody Fails Not Available Visits Status Start Date End Date 1 New Request 11/28/17 11/28/18 If your referral has a status of pending review or denied, additional information will be sent to support the outcome of this decision. Introducing [address]! Dear Adrianna Janell: Thank you for requesting a [employer] account. Our records indicate that you already have an active [employer] account. You can access your account anytime at [email or web address] Did you know that you can access your hospital and ER discharge instructions at any time in [employer]? You can also review all of your test results from your hospital stay or ER visit. Additional Information If you have questions, please visit the Frequently Asked Questions section of the [employer] website at [email or web address] Remember, [employer] is NOT to be used for urgent needs. For medical emergencies, dial 911. Now available from your iPhone and Android! Please provide this summary of care documentation to your next provider. Your primary care clinician is listed as [facility] [facility]. If you have any questions after today's visit, please call [phone].

## 2018-01-29 ENCOUNTER — TELEPHONE (OUTPATIENT)
Dept: PULMONOLOGY | Age: 63
End: 2018-01-29

## 2018-01-29 NOTE — TELEPHONE ENCOUNTER
Pulmonary Rehab called patient and spoke to her about the program. She is still interested, but does not get Medicare until April 2018. She requested a call back in April. Will follow up at that point.     Thank you,  Jennifer Hernandez

## 2018-02-15 ENCOUNTER — TELEPHONE (OUTPATIENT)
Dept: PULMONOLOGY | Age: 63
End: 2018-02-15

## 2018-02-15 ENCOUNTER — HOSPITAL ENCOUNTER (OUTPATIENT)
Dept: MAMMOGRAPHY | Age: 63
Discharge: HOME | End: 2018-02-15
Attending: FAMILY MEDICINE
Payer: MEDICAID

## 2018-02-15 DIAGNOSIS — Z12.31 VISIT FOR SCREENING MAMMOGRAM: ICD-10-CM

## 2018-02-15 PROCEDURE — 77067 SCR MAMMO BI INCL CAD: CPT

## 2018-02-15 NOTE — TELEPHONE ENCOUNTER
Referral to [facility] re-faxed to Sara Benoit at [facility].

## 2018-03-22 ENCOUNTER — TELEPHONE (OUTPATIENT)
Dept: PULMONOLOGY | Age: 63
End: 2018-03-22

## 2018-03-22 NOTE — TELEPHONE ENCOUNTER
Sent a fax to Marycarmen Lay ('R), referral coordinator at [facility], requesting she give me a call regarding patient's referral.    Marycarmen Lay ('R) returned my call. She states she has not closed out patient's referral yet, however she has made 4 total attempts to reach patient since receiving referral on 2/15/18, 3 via phone and she mailed patient a letter on 3/07/18. Patient still has not responded. Explained that I called patient today and left her a message to contact me regarding her referral to [facility]. If I do not hear from the patient by Friday afternoon at 2:00 PM, I will call Roseann and we can close this referral, we can re-refer the patient for transplant surgery when she follows up with Dr. Tracy De La Fuente. Roseann verbalized agreement and understanding.

## 2018-03-22 NOTE — TELEPHONE ENCOUNTER
Called and left patient a message requesting a call back regarding referral to [facility] transplant center.

## 2018-04-23 ENCOUNTER — TELEPHONE (OUTPATIENT)
Dept: PULMONOLOGY | Age: 63
End: 2018-04-23

## 2018-04-23 NOTE — TELEPHONE ENCOUNTER
Pulmonary Rehab called patient and spoke to her about the program. She is interested and does now have Medicare. She is a bit confused about her coverage. Will follow up once I have more information about her coverage.      Thank you,  Regla Hanna

## 2018-05-02 ENCOUNTER — TELEPHONE (OUTPATIENT)
Dept: PULMONOLOGY | Age: 63
End: 2018-05-02

## 2018-05-02 NOTE — TELEPHONE ENCOUNTER
Pulmonary Rehab called patient and confirmed her insurance. She does have Medicare A and B and Medicaid, with [employer] as her prescription coverage. She is scheduled for an evaluation on 5/7/18 at 8:00.      Thank you,  Alvino Larose

## 2018-05-07 ENCOUNTER — HOSPITAL ENCOUNTER (OUTPATIENT)
Dept: PULMONOLOGY | Age: 63
Discharge: HOME | End: 2018-05-07
Payer: MEDICARE

## 2018-05-07 PROCEDURE — 94618 PULMONARY STRESS TESTING: CPT

## 2018-05-11 ENCOUNTER — TELEPHONE (OUTPATIENT)
Dept: PULMONOLOGY | Age: 63
End: 2018-05-11

## 2018-05-14 DIAGNOSIS — I27.20 PULMONARY HYPERTENSION (HCC): Primary | ICD-10-CM

## 2018-05-14 RX ORDER — SILDENAFIL CITRATE 20 MG/1
20 TABLET ORAL 3 TIMES DAILY
Qty: 90 TAB | Refills: 0 | Status: SHIPPED | OUTPATIENT
Start: 2018-05-14 | End: 2018-07-11 | Stop reason: SDUPTHER

## 2018-05-14 RX ORDER — SILDENAFIL CITRATE 20 MG/1
20 TABLET ORAL 3 TIMES DAILY
Qty: 90 TAB | Refills: 5 | Status: CANCELLED | OUTPATIENT
Start: 2018-05-14

## 2018-05-14 NOTE — TELEPHONE ENCOUNTER
PT [phone]). NEEDS REFILL FOR SILDENALIL 20MG SENT TO [facility] [phone]. ALSO WANTS TO TALK TO NURSE REGARDING PULM REHAB. PLEASE CALL BACK.

## 2018-05-15 ENCOUNTER — TELEPHONE (OUTPATIENT)
Dept: PULMONOLOGY | Age: 63
End: 2018-05-15

## 2018-05-15 NOTE — TELEPHONE ENCOUNTER
Man from [employer] called to get prior auth for moisés for pt.  Please call [phone] ext. [phone]

## 2018-05-15 NOTE — TELEPHONE ENCOUNTER
Rankin Krabbe wants to know the update on the prior auth form they faxed to our office. Prior auth ready for signature of doctor. Will fax as soon as signed.

## 2018-05-16 ENCOUNTER — HOSPITAL ENCOUNTER (OUTPATIENT)
Dept: PULMONOLOGY | Age: 63
Discharge: HOME | End: 2018-05-16
Payer: MEDICARE

## 2018-05-16 PROCEDURE — G0237 THERAPEUTIC PROCD STRG ENDUR: HCPCS

## 2018-05-16 NOTE — PROGRESS NOTES
[facility]  Respiratory Therapy Flowsheet      Brooklynn Aiken  1955       Patient's carry-over of treatment delivered by: Day one of therapy    Objective Daily Findings    Comments:    Respiratory Muscle Functioning/Exercise Conditioning/Strengthening Session:    Time In: 4449  Time PWN::2258  Session Number: 1  O2 with Therapy: 4 L/min    Pre SPO2 96  Pre HR:82  Pre BP: 133/78    RR: 18    Wt: 180  Temp: not taken by patient   Post SPO2: 96 Post HR: 86  Post BP: 149/86    Self Care Home Management Instruction/Education    Patient Self Monitored Activity Time In:1430 Time Out:1515     [] - Breathing Retraining   [] - Smoking Cessation   [] - Exercise Concepts   [] - Nutrition    [] - Collaborative Self-Management  [] - Secretion Clearance and Home Management    [] - Body Mechanics & Energy Conservation/Work Simplification (EC/WS) Techniques    [x] - COPD & Lung Disease   [] - Travel & Environment  [] - Relaxation Techniques   [] - Medication & Side Effects   [] - Medication Adverse Interactions   [] - Oxygen Training   [] - Metered Dose Inhaler (MDI)   [] - Stress Management   [] - Sexuality & Lung Disease  [] - Fall Risk Precautions      Patients response to Education [x] - Patient actively participated in education  [] - Patient disengaged during education     [x] - Able to recite main objectives  [] - Unable to recite main objectives [] - Able to demonstrate    [x] - NA  [] - Unable to demonstrate       Comments:                Individual Therapy         Goal    Actual                      During Therapy                 Post-Therapy     % SpO2 HR PDS  1-10 PES  1-10 Pain  1 - 10 % SpO2 HR PDS  1-10 PES  1-10 Pain 0 - 10   Treadmill                 Bike               Stepper L3  30 min L2  30 min 99 86 1 2 1 99 83 1 1 1   Arm Ergometer 10 llanes  30 min 5 llanes  25 min 97 98 1 4 1 93 96 1 1 1   Rower               Weight Training               Therabands               Weighted DB IMT 7 cm  20 min 6 cm  10 min 96 81 1 2 1 96 83 1 1 1   IS 1000 ml  25 min 750 ml  20 min 97 85 1 2 1 96 83 1 1 1   PEP 7 cm  20 min 6 cm  15 min 98 72 1 2 1 98 71 1 1 1     Patient's response to therapy:     [] - ? Dyspnea    [] - ? Fatigue    [] - ? Heart rate    [] - ? Wheeze    [] - ? Cough     [] - ? Pain; location: _____________________________  [] - ? SpO2     [] - ? Pacing     [] - ? Pursed lip breathing   [] - ? Diaphragmatic breathing     [] - Experienced no problems; no cuing required.   [x] - Good effort     [] - Fair effort     [] - Poor effort    [x] - Good motivation      [] - Fair motivation     [] - Poor motivation    Comments  :    Assessment  Patient's response:   [] Patient unable to progress towards LTGs due to:  [] - Illness     [] - Weakness / debility [] - Poor effort     [] - Poor Motivation     [] - Poor Attendance        [x] Patient progressing towards LTGs: evident by:     [] Decreased Dyspnea on exertion   [] Decreased Fatigue   [x] Increased PLB        [] Increased DB    [] Decreased pain    [] Increased Pacing     [] Reduced work of breathing, increased SpO2, and decreased PDS, PES, and PPS  [] Improved ventilatory muscle (diaphragm) strength and endurance    [] Improved Aerobic capacity and endurance     [] Cigarette reduction / Smoking cessation participation      [] Other:              Plan:       [x] - Continue as written   OR    [] - Adjust treatment plan as follows:       Billing:    # __4___     # _____     # _____ Other       _55____ Minutes of Individualized Treatment ()        _____ Minutes of Group Treatment (Tejas)        _____ Minutes of Other (________________________________)             Physician supervision provided this date of service by: Dr. Vincent Knee       Therapist Signature:Madiha Murray RT    Therapist PRINTED Name and Credential: RT Luda    5/16/2018  2:29 PM

## 2018-05-17 ENCOUNTER — APPOINTMENT (OUTPATIENT)
Dept: PULMONOLOGY | Age: 63
End: 2018-05-17
Payer: MEDICARE

## 2018-05-18 ENCOUNTER — HOSPITAL ENCOUNTER (OUTPATIENT)
Dept: PULMONOLOGY | Age: 63
Discharge: HOME | End: 2018-05-18
Payer: MEDICARE

## 2018-05-18 ENCOUNTER — APPOINTMENT (OUTPATIENT)
Dept: PULMONOLOGY | Age: 63
End: 2018-05-18
Payer: MEDICARE

## 2018-05-18 PROCEDURE — G0237 THERAPEUTIC PROCD STRG ENDUR: HCPCS

## 2018-05-18 NOTE — PROGRESS NOTES
[facility]  Respiratory Therapy Flowsheet      Yinka Keenan  1955       Patient's carry-over of treatment delivered by: Patient reports that she finds the educational videos very helpful and informative. Objective Daily Findings    Comments:No distress noted. Respiratory Muscle Functioning/Exercise Conditioning/Strengthening Session:    Time In: 1430  Time Out::1625  Session Number: 2  O2 with Therapy: 4 L/min    Pre SPO2 93  Pre HR:89  Pre BP: 130/80    RR: 18    Wt: 183  Temp: not taken by patient   Post SPO2: 95 Post HR: 82  Post BP: 130/67    Self Care Home Management Instruction/Education    Patient Self Monitored Activity Time In:1430 Time Out:1520     [x] - Breathing Retraining   [] - Smoking Cessation   [] - Exercise Concepts   [] - Nutrition    [] - Collaborative Self-Management  [] - Secretion Clearance and Home Management    [] - Body Mechanics & Energy Conservation/Work Simplification (EC/WS) Techniques    [] - COPD & Lung Disease   [] - Travel & Environment  [] - Relaxation Techniques   [] - Medication & Side Effects   [] - Medication Adverse Interactions   [] - Oxygen Training   [x] - Metered Dose Inhaler (MDI)   [] - Stress Management   [] - Sexuality & Lung Disease  [] - Fall Risk Precautions      Patients response to Education [x] - Patient actively participated in education  [] - Patient disengaged during education     [x] - Able to recite main objectives  [] - Unable to recite main objectives [x] - Able to demonstrate    [] - NA  [] - Unable to demonstrate       Comments: Patient given a spacer device and instructed on proper use. Patient able to properly demonstrate.                Individual Therapy         Goal    Actual                      During Therapy                 Post-Therapy     % SpO2 HR PDS  1-10 PES  1-10 Pain  1 - 10 % SpO2 HR PDS  1-10 PES  1-10 Pain 0 - 10   Treadmill                 Bike               Stepper L5  30 min L4  30 min 97 93 1 4 1 97 90 1 1 1   Arm Ergometer 15 llanes  30 min 10 llanes  25 min 97 95 1 4 1 96 93 1 1 1   Rower               Weight Training               Therabands               Weighted DB               IMT 10cm   20 min 9cm  15 min 96 88 1 2 1 95 85 1 1 1   IS 1250 ml  25 min 1500 ml  20 min 98 77 1 4 1 98 76 1 1 1   PEP 10cm  20 m in 9cm  15 min 99 81 1 2 1 99 80 1 1 1     Patient's response to therapy:     [] - ? Dyspnea    [] - ? Fatigue    [] - ? Heart rate    [] - ? Wheeze    [] - ? Cough     [] - ? Pain; location: _____________________________  [] - ? SpO2     [] - ? Pacing     [] - ? Pursed lip breathing   [] - ? Diaphragmatic breathing     [] - Experienced no problems; no cuing required.   [x] - Good effort     [] - Fair effort     [] - Poor effort    [x] - Good motivation      [] - Fair motivation     [] - Poor motivation    Comments  :    Assessment  Patient's response:   [] Patient unable to progress towards LTGs due to:  [] - Illness     [] - Weakness / debility [] - Poor effort     [] - Poor Motivation     [] - Poor Attendance        [x] Patient progressing towards LTGs: evident by:     [] Decreased Dyspnea on exertion   [] Decreased Fatigue   [x] Increased PLB        [] Increased DB    [] Decreased pain    [] Increased Pacing     [] Reduced work of breathing, increased SpO2, and decreased PDS, PES, and PPS  [] Improved ventilatory muscle (diaphragm) strength and endurance    [] Improved Aerobic capacity and endurance     [] Cigarette reduction / Smoking cessation participation      [] Other:              Plan:       [x] - Continue as written   OR    [] - Adjust treatment plan as follows:       Billing:    # __4___     # _____     # _____ Other       ___55__ Minutes of Individualized Treatment ()        _____ Minutes of Group Treatment ()        _____ Minutes of Other (________________________________)             Physician supervision provided this date of service by: Dr. Saleh Agent       Therapist Signature:Madiha Armendariz RT    Therapist PRINTED Name and Credential: RT Jules    5/18/2018  2:33 PM

## 2018-05-21 ENCOUNTER — APPOINTMENT (OUTPATIENT)
Dept: PULMONOLOGY | Age: 63
End: 2018-05-21
Payer: MEDICARE

## 2018-05-21 ENCOUNTER — HOSPITAL ENCOUNTER (OUTPATIENT)
Dept: PULMONOLOGY | Age: 63
Discharge: HOME | End: 2018-05-21
Payer: MEDICARE

## 2018-05-21 PROCEDURE — G0237 THERAPEUTIC PROCD STRG ENDUR: HCPCS

## 2018-05-21 NOTE — PROGRESS NOTES
[address] [place] [address]   Respiratory Therapy Flowsheet      Cristino Malcolm  1955       Patient's carry-over of treatment delivered by: Patient reports that she feels like she is breathing a little better and that she looks forward to coming to Pulmonary Rehab. Objective Daily Findings    Comments:No distress noted. Respiratory Muscle Functioning/Exercise Conditioning/Strengthening Session:    Time In: 1435  Time Out::1625  Session Number: 3  O2 with Therapy: 4 L/min    Pre SPO2 92  Pre HR:95  Pre BP: 128/74    RR: 18    Wt: 181.8  Temp: 97.4   Post SPO2: 96 Post HR: 74  Post BP: 144/76    Self Care Home Management Instruction/Education    Patient Self Monitored Activity Time In:1440 Time Out:1530     [] - Breathing Retraining   [] - Smoking Cessation   [x] - Exercise Concepts   [] - Nutrition    [] - Collaborative Self-Management  [] - Secretion Clearance and Home Management    [] - Body Mechanics & Energy Conservation/Work Simplification (EC/WS) Techniques    [] - COPD & Lung Disease   [] - Travel & Environment  [] - Relaxation Techniques   [] - Medication & Side Effects   [] - Medication Adverse Interactions   [] - Oxygen Training   [] - Metered Dose Inhaler (MDI)   [] - Stress Management   [] - Sexuality & Lung Disease  [] - Fall Risk Precautions      Patients response to Education [x] - Patient actively participated in education  [] - Patient disengaged during education     [x] - Able to recite main objectives  [] - Unable to recite main objectives [] - Able to demonstrate    [x] - NA  [] - Unable to demonstrate       Comments:                Individual Therapy         Goal    Actual                      During Therapy                 Post-Therapy     % SpO2 HR PDS  1-10 PES  1-10 Pain  1 - 10 % SpO2 HR PDS  1-10 PES  1-10 Pain 0 - 10   Treadmill                 Bike               Stepper L5  30 min L4  30 min 95 94 1 2 1 95 92 1 1 1   Arm Ergometer 15 llanes  30 min 10 llanes  25 min 97 86 1 2 1 97 85 1 1 1   Rower               Weight Training               Therabands               Weighted DB               IMT 10 cm  20 min 9 cm  15 min 97 83 1 2 1 97 82 1 1 1   IS 1250 ml  25 min 1250 ml  20 min 97 84 1 2 1 97 83 1 1 1   PEP 10 cm  20 min 9 cm  15 min 99 79 1 2 1 99 78 1 1 1     Patient's response to therapy:     [] - ? Dyspnea    [] - ? Fatigue    [] - ? Heart rate    [] - ? Wheeze    [] - ? Cough     [] - ? Pain; location: _____________________________  [] - ? SpO2     [] - ? Pacing     [] - ? Pursed lip breathing   [] - ? Diaphragmatic breathing     [] - Experienced no problems; no cuing required.   [x] - Good effort     [] - Fair effort     [] - Poor effort    [x] - Good motivation      [] - Fair motivation     [] - Poor motivation    Comments  :    Assessment  Patient's response:   [] Patient unable to progress towards LTGs due to:  [] - Illness     [] - Weakness / debility [] - Poor effort     [] - Poor Motivation     [] - Poor Attendance        [x] Patient progressing towards LTGs: evident by:     [] Decreased Dyspnea on exertion   [] Decreased Fatigue   [x] Increased PLB        [] Increased DB    [] Decreased pain    [] Increased Pacing     [] Reduced work of breathing, increased SpO2, and decreased PDS, PES, and PPS  [] Improved ventilatory muscle (diaphragm) strength and endurance    [] Improved Aerobic capacity and endurance     [] Cigarette reduction / Smoking cessation participation      [] Other:              Plan:       [x] - Continue as written   OR    [] - Adjust treatment plan as follows:       Billing:    # __4___     # _____     # _____ Other       _55____ Minutes of Individualized Treatment ()        _____ Minutes of Group Treatment (LavianaChloe)        _____ Minutes of Other (________________________________)             Physician supervision provided this date of service by: Dr. Derian Hammond       Therapist Signature:Madiha Garcia, RT    Therapist PRINTED Name and Credential: Chong Aviles Juanpablo Bailey, RT    5/21/2018  2:37 PM

## 2018-05-22 ENCOUNTER — APPOINTMENT (OUTPATIENT)
Dept: PULMONOLOGY | Age: 63
End: 2018-05-22
Payer: MEDICARE

## 2018-05-23 ENCOUNTER — APPOINTMENT (OUTPATIENT)
Dept: PULMONOLOGY | Age: 63
End: 2018-05-23
Payer: MEDICARE

## 2018-05-23 ENCOUNTER — HOSPITAL ENCOUNTER (OUTPATIENT)
Dept: PULMONOLOGY | Age: 63
Discharge: HOME | End: 2018-05-23
Payer: MEDICARE

## 2018-05-23 PROCEDURE — G0237 THERAPEUTIC PROCD STRG ENDUR: HCPCS

## 2018-05-23 NOTE — PROGRESS NOTES
[facility]  Respiratory Therapy Flowsheet      Chiara Zuleta  1955       Patient's carry-over of treatment delivered by: Patient reports that she has been feeling more short of breath over the past two days. She contributes it to the humidity. Objective Daily Findings    Comments:No signs of distress. Respiratory Muscle Functioning/Exercise Conditioning/Strengthening Session:    Time In: 1435  Time Out::1425  Session Number: 4  O2 with Therapy: 6 L/min    Pre SPO2 93  Pre HR:94  Pre BP: 120/74    RR: 18    Wt: 183  Temp: 98.4   Post SPO2: 96 Post HR: 77  Post BP: 120/70    Self Care Home Management Instruction/Education    Patient Self Monitored Activity Time In:1435 Time Out:1530     [] - Breathing Retraining   [] - Smoking Cessation   [] - Exercise Concepts   [] - Nutrition    [] - Collaborative Self-Management  [] - Secretion Clearance and Home Management    [] - Body Mechanics & Energy Conservation/Work Simplification (EC/WS) Techniques    [] - COPD & Lung Disease   [] - Travel & Environment  [] - Relaxation Techniques   [] - Medication & Side Effects   [] - Medication Adverse Interactions   [] - Oxygen Training   [] - Metered Dose Inhaler (MDI)   [] - Stress Management   [] - Sexuality & Lung Disease  [] - Fall Risk Precautions      Patients response to Education [] - Patient actively participated in education  [] - Patient disengaged during education     [] - Able to recite main objectives  [] - Unable to recite main objectives [] - Able to demonstrate    [] - NA  [] - Unable to demonstrate       Comments:         Individual Therapy         Goal    Actual                      During Therapy                 Post-Therapy     % SpO2 HR PDS  1-10 PES  1-10 Pain  1 - 10 % SpO2 HR PDS  1-10 PES  1-10 Pain 0 - 10   Treadmill                 Bike               Stepper L5  30 min L4  30 min 95 83 1 2 1 95 84 1 1 1   Arm Ergometer 20 llanes  30 min 15 llanes  25 min 94 91 1 2 1 94 90 1 1 1 Rower               Weight Training               Therabands               Weighted DB               IMT               IS 1500 ml  25 min 1300 ml  15 min 95 84 1 3 1 95 81 1 1 1   Nutrition class with Dietician 30-45 min 35 min               Patient's response to therapy:     [] - ? Dyspnea    [] - ? Fatigue    [] - ? Heart rate    [] - ? Wheeze    [] - ? Cough     [] - ? Pain; location: _____________________________  [] - ? SpO2     [] - ? Pacing     [] - ? Pursed lip breathing   [] - ? Diaphragmatic breathing     [] - Experienced no problems; no cuing required.   [x] - Good effort     [] - Fair effort     [] - Poor effort    [x] - Good motivation      [] - Fair motivation     [] - Poor motivation    Comments  :    Assessment  Patient's response:   [] Patient unable to progress towards LTGs due to:  [] - Illness     [] - Weakness / debility [] - Poor effort     [] - Poor Motivation     [] - Poor Attendance        [x] Patient progressing towards LTGs: evident by:     [x] Decreased Dyspnea on exertion   [] Decreased Fatigue   [] Increased PLB        [] Increased DB    [] Decreased pain    [] Increased Pacing     [] Reduced work of breathing, increased SpO2, and decreased PDS, PES, and PPS  [] Improved ventilatory muscle (diaphragm) strength and endurance    [] Improved Aerobic capacity and endurance     [] Cigarette reduction / Smoking cessation participation      [] Other:              Plan:       [x] - Continue as written   OR    [] - Adjust treatment plan as follows:       Billing:    # ___4__     # _____     # _____ Other       _55____ Minutes of Individualized Treatment ()        _____ Minutes of Group Treatment (Chante)        _____ Minutes of Other (________________________________)             Physician supervision provided this date of service by: Dr. Geoff Mayer       Therapist Signature:RT Benitez    Therapist PRINTED Name and Credential: RT Geovanny    5/23/2018  2:32 PM

## 2018-05-24 ENCOUNTER — APPOINTMENT (OUTPATIENT)
Dept: PULMONOLOGY | Age: 63
End: 2018-05-24
Payer: MEDICARE

## 2018-05-25 ENCOUNTER — HOSPITAL ENCOUNTER (OUTPATIENT)
Dept: PULMONOLOGY | Age: 63
Discharge: HOME | End: 2018-05-25
Payer: MEDICARE

## 2018-05-25 ENCOUNTER — APPOINTMENT (OUTPATIENT)
Dept: PULMONOLOGY | Age: 63
End: 2018-05-25
Payer: MEDICARE

## 2018-05-25 PROCEDURE — G0237 THERAPEUTIC PROCD STRG ENDUR: HCPCS

## 2018-05-25 NOTE — PROGRESS NOTES
[address] [place] [address]   Respiratory Therapy Flowsheet      Kim Fink  1955       Patient's carry-over of treatment delivered by: Patient reports using pursed lip breathing with activity. Objective Daily Findings    Comments:No distress noted.      Respiratory Muscle Functioning/Exercise Conditioning/Strengthening Session:    Time In: 1435  Time Out::1625  Session Number: 5  O2 with Therapy: 4 L/min    Pre SPO2 95  Pre HR:101  Pre BP: 143/87    RR: 18    Wt: 180  Temp: 98.4   Post SPO2: 94 Post HR: 76  Post BP: 137/77    Self Care Home Management Instruction/Education    Patient Self Monitored Activity Time In:1435 Time Out:1530     [] - Breathing Retraining   [] - Smoking Cessation   [] - Exercise Concepts   [] - Nutrition    [] - Collaborative Self-Management  [] - Secretion Clearance and Home Management    [] - Body Mechanics & Energy Conservation/Work Simplification (EC/WS) Techniques    [] - COPD & Lung Disease   [] - Travel & Environment  [] - Relaxation Techniques   [] - Medication & Side Effects   [] - Medication Adverse Interactions   [] - Oxygen Training   [] - Metered Dose Inhaler (MDI)   [] - Stress Management   [] - Sexuality & Lung Disease  [] - Fall Risk Precautions      Patients response to Education [x] - Patient actively participated in education  [] - Patient disengaged during education     [x] - Able to recite main objectives  [] - Unable to recite main objectives [] - Able to demonstrate    [x] - NA  [] - Unable to demonstrate       Comments: HYPERTENSION              Individual Therapy         Goal    Actual                      During Therapy                 Post-Therapy     % SpO2 HR PDS  1-10 PES  1-10 Pain  1 - 10 % SpO2 HR PDS  1-10 PES  1-10 Pain 0 - 10   Treadmill                 Bike               Stepper L5  30 min L4  30 min 99 93 1 2 1 99 92 1 1 1   Arm Ergometer 20 llanes  30 min 15 llanes  25 min 95 104 1 4 1 96 94 1 1 1   Rower               Weight Training Therabands               Weighted DB               IMT 10 cm  20 min 9 cm  15 min 96 92 1 2 1 96 91 1 1 1   IS 1500 ml  25 min 1750 ml  25 min 99 84 1 2 1 99 82 1 1 1   PEP 10 cm  20 min 9 cm  15 min 97 94 1 2 1 96 94 1 1 1     Patient's response to therapy:     [] - ? Dyspnea    [] - ? Fatigue    [] - ? Heart rate    [] - ? Wheeze    [] - ? Cough     [] - ? Pain; location: _____________________________  [] - ? SpO2     [] - ? Pacing     [] - ? Pursed lip breathing   [] - ? Diaphragmatic breathing     [] - Experienced no problems; no cuing required.   [x] - Good effort     [] - Fair effort     [] - Poor effort    [x] - Good motivation      [] - Fair motivation     [] - Poor motivation    Comments  :    Assessment  Patient's response:   [] Patient unable to progress towards LTGs due to:  [] - Illness     [] - Weakness / debility [] - Poor effort     [] - Poor Motivation     [] - Poor Attendance        [x] Patient progressing towards LTGs: evident by:     [] Decreased Dyspnea on exertion   [] Decreased Fatigue   [x] Increased PLB        [] Increased DB    [] Decreased pain    [] Increased Pacing     [] Reduced work of breathing, increased SpO2, and decreased PDS, PES, and PPS  [] Improved ventilatory muscle (diaphragm) strength and endurance    [] Improved Aerobic capacity and endurance     [] Cigarette reduction / Smoking cessation participation      [] Other:              Plan:       [x] - Continue as written   OR    [] - Adjust treatment plan as follows:       Billing:    # _4____     # _____     # _____ Other       _55____ Minutes of Individualized Treatment ()        _____ Minutes of Group Treatment (Samreen Lizama)        _____ Minutes of Other (________________________________)             Physician supervision provided this date of service by: Dr. Michelle Victor       Therapist Signature:Madiha Tracey RT    Therapist PRINTED Name and Credential: RT Stefani    5/25/2018  2:35 PM

## 2018-05-28 ENCOUNTER — APPOINTMENT (OUTPATIENT)
Dept: PULMONOLOGY | Age: 63
End: 2018-05-28
Payer: MEDICARE

## 2018-05-29 ENCOUNTER — APPOINTMENT (OUTPATIENT)
Dept: PULMONOLOGY | Age: 63
End: 2018-05-29
Payer: MEDICARE

## 2018-05-30 ENCOUNTER — APPOINTMENT (OUTPATIENT)
Dept: PULMONOLOGY | Age: 63
End: 2018-05-30
Payer: MEDICARE

## 2018-05-30 ENCOUNTER — HOSPITAL ENCOUNTER (OUTPATIENT)
Dept: PULMONOLOGY | Age: 63
Discharge: HOME | End: 2018-05-30
Payer: MEDICARE

## 2018-05-30 PROCEDURE — G0237 THERAPEUTIC PROCD STRG ENDUR: HCPCS

## 2018-05-30 NOTE — PROGRESS NOTES
[facility]  Respiratory Therapy Flowsheet      Chico Mike  1955       Patient's carry-over of treatment delivered by: Patient reports that she is able to complete activities of daily living with less shortness of breath. Objective Daily Findings    Comments:No distress noted.      Respiratory Muscle Functioning/Exercise Conditioning/Strengthening Session:    Time In: 1430  Time Out::1625  Session Number: 6  O2 with Therapy: 4 L/min    Pre SPO2 94  Pre HR:100  Pre BP: 128/68    RR: 18    Wt: 182  Temp: 98.0   Post SPO2: 94 Post HR: 91  Post BP: 118/78    Self Care Home Management Instruction/Education    Patient Self Monitored Activity Time In:1430 Time Out:1525     [] - Breathing Retraining   [] - Smoking Cessation   [] - Exercise Concepts   [] - Nutrition    [] - Collaborative Self-Management  [] - Secretion Clearance and Home Management    [] - Body Mechanics & Energy Conservation/Work Simplification (EC/WS) Techniques    [] - COPD & Lung Disease   [] - Travel & Environment  [] - Relaxation Techniques   [] - Medication & Side Effects   [] - Medication Adverse Interactions   [] - Oxygen Training   [] - Metered Dose Inhaler (MDI)   [] - Stress Management   [] - Sexuality & Lung Disease  [] - Fall Risk Precautions      Patients response to Education [x] - Patient actively participated in education  [] - Patient disengaged during education     [x] - Able to recite main objectives  [] - Unable to recite main objectives [x] - Able to demonstrate    [x] - NA  [] - Unable to demonstrate       Comments: PACING              Individual Therapy         Goal    Actual                      During Therapy                 Post-Therapy     % SpO2 HR PDS  1-10 PES  1-10 Pain  1 - 10 % SpO2 HR PDS  1-10 PES  1-10 Pain 0 - 10   Treadmill   1.0 mph  20 min 1.0 mph  10 min 96 106 2 2 1 96 104 1 1 1   Bike               Stepper L5  30 min L4  20 min 97 103 1 2 1 96 102 1 1 1   Arm Ergometer 10 llanes  30 min 5 llanes  25 min 96 111 1 2 1 97 111 1 1 1   Rower               Weight Training               Therabands               Weighted DB               IMT 10 cm  20 min 9 cm  15 min 96 98 1 2 1 95 92 1 1 1   IS 1750 ml  25 min 1750 ml  25 min 96 95 1 2 1 96 96 1 1 1   PEP 10 cm  20 min 9 cm  15 min 96 81 1 2 1 96 83 1 1 1     Patient's response to therapy:     [] - ? Dyspnea    [] - ? Fatigue    [] - ? Heart rate    [] - ? Wheeze    [] - ? Cough     [] - ? Pain; location: _____________________________  [] - ? SpO2     [] - ? Pacing     [] - ? Pursed lip breathing   [] - ? Diaphragmatic breathing     [] - Experienced no problems; no cuing required.   [x] - Good effort     [] - Fair effort     [] - Poor effort    [x] - Good motivation      [] - Fair motivation     [] - Poor motivation    Comments  :    Assessment  Patient's response:   [] Patient unable to progress towards LTGs due to:  [] - Illness     [] - Weakness / debility [] - Poor effort     [] - Poor Motivation     [] - Poor Attendance        [x] Patient progressing towards LTGs: evident by:     [] Decreased Dyspnea on exertion   [] Decreased Fatigue   [] Increased PLB        [] Increased DB    [] Decreased pain    [x] Increased Pacing     [] Reduced work of breathing, increased SpO2, and decreased PDS, PES, and PPS  [] Improved ventilatory muscle (diaphragm) strength and endurance    [] Improved Aerobic capacity and endurance     [] Cigarette reduction / Smoking cessation participation      [] Other:              Plan:       [x] - Continue as written   OR    [] - Adjust treatment plan as follows:       Billing:    # __4___     # _____ [email or web address]    # _____ Other       ___55__ Minutes of Individualized Treatment ()        _____ Minutes of Group Treatment (Hermelindo.Matter)        _____ Minutes of Other (________________________________)             Physician supervision provided this date of service by: Dr. Magdy Yu       Therapist Signature:Madiha Corley RT    Therapist PRINTED Name and Credential: RT Rosetta    5/30/2018  2:35 PM

## 2018-05-31 ENCOUNTER — APPOINTMENT (OUTPATIENT)
Dept: PULMONOLOGY | Age: 63
End: 2018-05-31
Payer: MEDICARE

## 2018-06-01 ENCOUNTER — HOSPITAL ENCOUNTER (OUTPATIENT)
Dept: PULMONOLOGY | Age: 63
Discharge: HOME | End: 2018-06-01
Payer: MEDICARE

## 2018-06-01 ENCOUNTER — APPOINTMENT (OUTPATIENT)
Dept: PULMONOLOGY | Age: 63
End: 2018-06-01
Payer: MEDICARE

## 2018-06-01 PROCEDURE — G0237 THERAPEUTIC PROCD STRG ENDUR: HCPCS

## 2018-06-04 ENCOUNTER — APPOINTMENT (OUTPATIENT)
Dept: PULMONOLOGY | Age: 63
End: 2018-06-04
Payer: MEDICARE

## 2018-06-04 ENCOUNTER — HOSPITAL ENCOUNTER (OUTPATIENT)
Dept: PULMONOLOGY | Age: 63
Discharge: HOME | End: 2018-06-04
Payer: MEDICARE

## 2018-06-04 PROCEDURE — G0237 THERAPEUTIC PROCD STRG ENDUR: HCPCS

## 2018-06-04 NOTE — PROGRESS NOTES
[facility]  Respiratory Therapy Flowsheet      Kim Fink  1955       Patient's carry-over of treatment delivered by: Patient reports that Pulmonary Rehab has given her a reason to leave the house. Objective Daily Findings    Comments:No distress noted. Respiratory Muscle Functioning/Exercise Conditioning/Strengthening Session:    Time In: 1430  Time Out::1620  Session Number: 8  O2 with Therapy: 6 L/min    Pre SPO2 96  Pre HR:76  Pre BP: 118/70    RR: 18    Wt: 181.1  Temp: 98.4   Post SPO2: 95 Post HR: 77  Post BP: 130/71    Self Care Home Management Instruction/Education    Patient Self Monitored Activity Time In:1435 Time Out:1525     [] - Breathing Retraining   [] - Smoking Cessation   [x] - Exercise Concepts   [] - Nutrition    [] - Collaborative Self-Management  [] - Secretion Clearance and Home Management    [] - Body Mechanics & Energy Conservation/Work Simplification (EC/WS) Techniques    [] - COPD & Lung Disease   [] - Travel & Environment  [] - Relaxation Techniques   [] - Medication & Side Effects   [] - Medication Adverse Interactions   [] - Oxygen Training   [] - Metered Dose Inhaler (MDI)   [] - Stress Management   [] - Sexuality & Lung Disease  [] - Fall Risk Precautions      Patients response to Education [x] - Patient actively participated in education  [] - Patient disengaged during education     [x] - Able to recite main objectives  [] - Unable to recite main objectives [] - Able to demonstrate    [x] - NA  [] - Unable to demonstrate       Comments:                Individual Therapy         Goal    Actual                      During Therapy                 Post-Therapy     % SpO2 HR PDS  1-10 PES  1-10 Pain  1 - 10 % SpO2 HR PDS  1-10 PES  1-10 Pain 0 - 10   Treadmill   1.3 mph  20 min 1.1 mph  15 min 90 89 2 2 1 93 87 1 1 1   Bike               Stepper L5  20 min L4  15 min 97 79 1 2 1 97 78 1 1 1   Arm Ergometer 20 llanes  30 min 15 llanes  25 min 97 75 1 2 1 97 76 1 1 1   Rower               Weight Training               Therabands               Weighted DB               IMT 9 cm  20 min 8 cm  15 min 98 67 1 2 1 98 64 1 1 1   IS 2000 ml  25 min 1750 ml  20 min 96 65 1 2 1 96 68 1 1 1   PEP 9 cm  20 min 8 cm  15 min 99 65 1 2 1 99 64 1 1 1     Patient's response to therapy:     [] - ? Dyspnea    [] - ? Fatigue    [] - ? Heart rate    [] - ? Wheeze    [] - ? Cough     [] - ? Pain; location: _____________________________  [] - ? SpO2     [] - ? Pacing     [] - ? Pursed lip breathing   [] - ? Diaphragmatic breathing     [] - Experienced no problems; no cuing required.   [x] - Good effort     [] - Fair effort     [] - Poor effort    [x] - Good motivation      [] - Fair motivation     [] - Poor motivation    Comments  :    Assessment  Patient's response:   [] Patient unable to progress towards LTGs due to:  [] - Illness     [] - Weakness / debility [] - Poor effort     [] - Poor Motivation     [] - Poor Attendance        [x] Patient progressing towards LTGs: evident by:     [x] Decreased Dyspnea on exertion   [] Decreased Fatigue   [] Increased PLB        [] Increased DB    [] Decreased pain    [] Increased Pacing     [] Reduced work of breathing, increased SpO2, and decreased PDS, PES, and PPS  [] Improved ventilatory muscle (diaphragm) strength and endurance    [] Improved Aerobic capacity and endurance     [] Cigarette reduction / Smoking cessation participation      [] Other:              Plan:       [x] - Continue as written   OR    [] - Adjust treatment plan as follows:       Billing:    # _4____     # _____     # _____ Other       __55___ Minutes of Individualized Treatment ()        _____ Minutes of Group Treatment (Kendal Cera)        _____ Minutes of Other (________________________________)             Physician supervision provided this date of service by: Dr. Derian Hammond        Therapist Signature:Madiha Garcia RT    Therapist PRINTED Name and Credential: Chong Aviles Melanie Talbert, RT    6/4/2018  2:28 PM

## 2018-06-05 ENCOUNTER — APPOINTMENT (OUTPATIENT)
Dept: PULMONOLOGY | Age: 63
End: 2018-06-05
Payer: MEDICARE

## 2018-06-06 ENCOUNTER — APPOINTMENT (OUTPATIENT)
Dept: PULMONOLOGY | Age: 63
End: 2018-06-06
Payer: MEDICARE

## 2018-06-07 ENCOUNTER — OFFICE VISIT (OUTPATIENT)
Dept: PULMONOLOGY | Age: 63
End: 2018-06-07

## 2018-06-07 ENCOUNTER — APPOINTMENT (OUTPATIENT)
Dept: PULMONOLOGY | Age: 63
End: 2018-06-07
Payer: MEDICARE

## 2018-06-07 VITALS
OXYGEN SATURATION: 95 % | HEIGHT: 65 IN | TEMPERATURE: 98.2 F | RESPIRATION RATE: 18 BRPM | HEART RATE: 92 BPM | SYSTOLIC BLOOD PRESSURE: 116 MMHG | BODY MASS INDEX: 29.82 KG/M2 | DIASTOLIC BLOOD PRESSURE: 64 MMHG | WEIGHT: 179 LBS

## 2018-06-07 DIAGNOSIS — I27.20 PULMONARY HYPERTENSION (HCC): Primary | ICD-10-CM

## 2018-06-07 DIAGNOSIS — Z99.81 HYPOXEMIA REQUIRING SUPPLEMENTAL OXYGEN: ICD-10-CM

## 2018-06-07 DIAGNOSIS — R09.02 HYPOXEMIA REQUIRING SUPPLEMENTAL OXYGEN: ICD-10-CM

## 2018-06-07 DIAGNOSIS — J44.9 COPD, SEVERE (HCC): ICD-10-CM

## 2018-06-07 DIAGNOSIS — A31.0 MAI (MYCOBACTERIUM AVIUM-INTRACELLULARE) (HCC): ICD-10-CM

## 2018-06-07 RX ORDER — IPRATROPIUM BROMIDE AND ALBUTEROL SULFATE 2.5; .5 MG/3ML; MG/3ML
3 SOLUTION RESPIRATORY (INHALATION)
Qty: 120 NEBULE | Refills: 5 | Status: SHIPPED | OUTPATIENT
Start: 2018-06-07 | End: 2018-07-24 | Stop reason: SDUPTHER

## 2018-06-07 RX ORDER — ASPIRIN 325 MG
50000 TABLET, DELAYED RELEASE (ENTERIC COATED) ORAL
COMMUNITY
End: 2021-02-12

## 2018-06-07 NOTE — PROGRESS NOTES
[facility]  [address] [facility]. [address] [facility],[address], [address],[place] [address]  [facility]  () [phone]      Simple walk test done in office today. Qualifying O2 sats:     O2 Sat at rest on room air is: 87 %, Pulse 105    Placed on 2 L 3 minute recovery 87%, Pulse 107                > to 3L 3 minute recovery 91%, Pulse 106  Walked: on 3L 5 feet: 87 %, Pulse 110, SOB scale 0                 > to 4L 3 minute recovery 89%, Pulse 107  Walked on  4L 30 Feet: 88%, Pulse 108 SOB scale 0                 > to 5L 3 minute recovery 93%, Pulse 106  Walked on  5 L 55 Feet: 87%, Pulse 109 SOB scale 0                > to 6L 3 minute recovery 95%, Pulse 99  Walked on  6L 165 Feet: 87%, Pulse 109 SOB scale 0    3 minute recovery on 6 L: 94%, Pulse 102      Tech comments regarding testing: Patient states she does not feel SOB during desaturation.        Dung Perez LPN

## 2018-06-07 NOTE — PROGRESS NOTES
HISTORY OF PRESENT ILLNESS  Fabi Barrios is a 70 y.o. female. HPI Comments: Severe COPD and Pulmonary HTN, last PASP 60-70 mm Hg. Previously followed by Dr. Jett Rivas  Patient is a 59 y. o.AA female with severe COPD and Pulmonary HTN with hypoxemia needing home oxygen with low O2 sats at baseline. She was started on Letairis 5 mg daily. She was admitted to [facility] end of Dec 2015 with respiratory failure, worsened hypoxemia, cough, blood streaked sputum. CT chest and CXR showing new RT upper lobe pneumonia and cavitation. Due to past hx of +ve PPD with INH treatment, she was in respiratory isolation. She needed to be in icu with need for BIPAP and high flow oxygen. She was taken off respiratory isolation after 3 sputum samples with negative stain for AFB. She also underwent a bronchoscopy and BAL in the icu on 1/4/16. 2 sputum samples have shown ELSA and patient started on triple antimycobacterial therapy since 2/25/16 . She took triple therapy for 5 months and due to loss of insurance coverage, patient was unable to get medications since Jul 2016. CT chest Nov 2016 shows significant improvement suggesting that the right upper lobe cavitary pneumonia was bacterial. Patient was subsequently discharged on home O2 at 7-10 lits concentration. Patient oxygenation has improved steadily and is now down to 3 lits nc O2 at home at rest and 4 lits on exertion. Pt does complain of some SOB on moderate exertion. Denies chest pain or hemoptysis. No fever or chills. Pt notes episodic SOB with wheezing, sometimes triggered by dust, fumes and strong smells, treated with Albuterol rescue inhaler. Pt was seen at [facility] for transplant evaluation. Test done also confirmed severe obstructive lung disease with reduced DLCO. Her 6 minute walk test was marked by severe limitation of ambulation and oxygen desaturation.         Review of Systems   Constitutional: Positive for malaise/fatigue (improving).  Negative for chills, diaphoresis, fever and weight loss. HENT: Negative for congestion, ear discharge, ear pain, hearing loss, nosebleeds, sinus pain, sore throat and tinnitus. Eyes: Negative for blurred vision, double vision, photophobia, pain, discharge and redness. Respiratory: Positive for cough (dry) and shortness of breath. Negative for hemoptysis, sputum production and stridor. Wheezing: rare. Cardiovascular: Negative for palpitations, orthopnea, claudication and PND. Leg swelling: intermittent. Gastrointestinal: Negative for abdominal pain, blood in stool, constipation, diarrhea, heartburn, melena, nausea and vomiting. Genitourinary: Negative for dysuria, flank pain, frequency, hematuria and urgency. Musculoskeletal: Positive for joint pain. Negative for back pain, falls, myalgias and neck pain. Skin: Negative for itching and rash. Neurological: Negative for dizziness, tingling, tremors, sensory change, speech change, focal weakness, seizures, loss of consciousness, weakness and headaches. Endo/Heme/Allergies: Negative for environmental allergies and polydipsia. Does not bruise/bleed easily. Psychiatric/Behavioral: Negative for depression, hallucinations, memory loss, substance abuse and suicidal ideas. The patient is not nervous/anxious and does not have insomnia. Past Medical History:   Diagnosis Date    [facility] 12/27/2015    [facility]F [facility] respiratory failure[facility] 12/27/2015    Asthma     Chronic lung disease     COPD (chronic obstructive pulmonary disease) (HCC)     Dependence on continuous supplemental oxygen     Diastolic heart failure (HCC)     Echocardiogram abnormal 12/29/2015    Hyperdynamic. EF 75%. No WMA. Gr 1 DDfx. Mild RVE. RVSP 60-70 mmHg (prev 120 mmHg on 10/1/15). Mild LAE. Mod-severe ELISHA. Mild-mod TR.  Gastric wall thickening 2009    Chronic Proximal Gastric Wall Calcifications.      Hyperlipidemia     Hypertension     ANNELIESE (obstructive sleep apnea)     Pulmonary arterial hypertension (HCC)     Pulmonary hypertension (HCC)     RVSP 120 mm Hg, cor pulmonale (echo Oct 2015)    Right upper lobe pneumonia ([address] 12/27/2015    Cavitary RUL Pneumonia    TB lung, latent 1986    Diagnosed/Treated.  Thallium stress test 11/12/2002    No prior infarction. Minimal to mild apical ischemia vs apical thinning. RVE.  Tuberculosis      Past Surgical History:   Procedure Laterality Date    BRONCHOSCOPY  01/04/2016    Dr. Sohail Boo HX DILATION AND CURETTAGE      HX GASTRIC BYPASS      HX HYSTERECTOMY      HX ORTHOPAEDIC      bone spur    HX TONSILLECTOMY      HX TUBAL LIGATION       Current Outpatient Prescriptions on File Prior to Visit   Medication Sig Dispense Refill    sildenafil, antihypertensive, (REVATIO) 20 mg tablet Take 1 Tab by mouth three (3) times daily. 90 Tab 0    budesonide-formoterol (SYMBICORT) 80-4.5 mcg/actuation HFAA Take 2 Puffs by inhalation two (2) times a day. 1 Inhaler 0    budesonide-formoterol (SYMBICORT) 80-4.5 mcg/actuation HFAA Take 1 Puff by inhalation two (2) times a day. 1 Inhaler 5    tiotropium (SPIRIVA WITH HANDIHALER) 18 mcg inhalation capsule Take 1 Cap by inhalation daily. 1 Cap 5    albuterol (PROVENTIL HFA, VENTOLIN HFA, PROAIR HFA) 90 mcg/actuation inhaler Take 2 Puffs by inhalation every four (4) hours as needed for Wheezing or Shortness of Breath. 1 Inhaler 5    furosemide (LASIX) 40 mg tablet Take 1 Tab by mouth daily. 30 Tab 3    pravastatin (PRAVACHOL) 20 mg tablet Take 20 mg by mouth nightly.  Ambrisentan (LETAIRIS) 5 mg tablet Take 5 mg by mouth daily. Indications: PULMONARY ARTERIAL HYPERTENSION 30 Tab 5    b complex vitamins (B-COMPLEX) tablet Take 1 Tab by mouth daily. 30 Tab 3    fluticasone (FLONASE) 50 mcg/actuation nasal spray 2 Sprays by Both Nostrils route daily.  benazepril (LOTENSIN) 20 mg tablet Take 20 mg by mouth daily.       OXYGEN-AIR DELIVERY SYSTEMS 6 L by Does Not Apply route. Uses 4 l when out      DULoxetine (CYMBALTA) 60 mg capsule Take 60 mg by mouth daily.  gabapentin (NEURONTIN) 300 mg capsule Take 300 mg by mouth three (3) times daily.  oxymetazoline (AFRIN) 0.05 % nasal spray 2 Sprays two (2) times a day.  albuterol-ipratropium (DUO-NEB) 2.5 mg-0.5 mg/3 ml nebulizer solution 3 mL by Nebulization route four (4) times daily. 120 Vial 5     No current facility-administered medications on file prior to visit. No Known Allergies    History reviewed. No pertinent family history. Social History     Social History    Marital status: SINGLE     Spouse name: N/A    Number of children: N/A    Years of education: N/A     Occupational History    Not on file. Social History Main Topics    Smoking status: Former Smoker     Packs/day: 2.00     Years: 20.00     Types: Cigarettes     Quit date: 1/1/1990    Smokeless tobacco: Never Used    Alcohol use Yes      Comment: social     Drug use: No    Sexual activity: Not on file     Other Topics Concern    Not on file     Social History Narrative     Blood pressure 116/64, pulse 92, temperature 98.2 °F (36.8 °C), temperature source Oral, resp. rate 18, height 5' 5\" (1.651 m), weight 81.2 kg (179 lb), SpO2 95 %. Ambulatory oximetry per nurse note  Physical Exam   Constitutional: She is oriented to person, place, and time. She appears well-developed and well-nourished. No distress. HENT:   Head: Normocephalic. Nose: Nose normal.   Mouth/Throat: No oropharyngeal exudate. Upper dentures, carious teeth   Eyes: Conjunctivae and EOM are normal. Pupils are equal, round, and reactive to light. Right eye exhibits no discharge. Left eye exhibits no discharge. No scleral icterus. Neck: No JVD present. No tracheal deviation present. No thyromegaly present. Cardiovascular: Normal rate, regular rhythm, normal heart sounds and intact distal pulses.   Exam reveals no gallop. No murmur heard. Pulmonary/Chest: Effort normal. No stridor. No respiratory distress. She has no wheezes. She has no rales. She exhibits no tenderness. Diminished breath sounds   Abdominal: Soft. She exhibits no mass. There is no tenderness. Musculoskeletal: She exhibits no tenderness. Edema: trace. Lymphadenopathy:     She has no cervical adenopathy. Neurological: She is alert and oriented to person, place, and time. Skin: Skin is warm and dry. No rash noted. She is not diaphoretic. No erythema. Psychiatric: She has a normal mood and affect. Her behavior is normal. Judgment and thought content normal.     CT Results (most recent):    Results from Hospital Encounter encounter on 09/29/17   CT CHEST WO CONT   Narrative CT scan of chest,[without] intravenous contrast:        INDICATION:    [Cavitary pneumonia. History of hypertension, COPD, diastolic heart failure, and pulmonary  hypertension. TECHNIQUE:    Multidetector CT scan with 5 mm slice thickness, without intravenous contrast,  including chest and subphrenic areas. 2-D sagittal and coronal images reformatted. All CT scans at this facility are performed using dose optimization technique as  appropriate to a  performed  examination, to include automated exposer control,  adjustment mA and / or  KV according to patient size (including appropriate  matching  for site specific examination), or use  of iterative  reconstruction  technique. COMPARISON STUDY: CT scan of chest on 11/9/2016.  --------------------------------------------------------------------    FINDINGS:        Thoracic inlet and supraclavicular areas: No diagnostic finding. Aortopulmonary window area:[ No diagnostic finding.]    Right paratracheal  area: [In the precarinal location there is a lymph node  measuring 2.2 cm x 1.26 cm, unchanged as compared to previous study. Horacio Hickman ]    Subcarinal area,:[ No diagnostic finding.]   Right pulmonary hilum:[ No diagnostic finding.] There are few small calcified  lymph nodes at right pulmonary hilum. Left pulmonary hilum:[ No diagnostic finding.] Few small calcified lymph nodes  at the left pulmonary hilum. Thoracic aorta: The diameter of ascending thoracic aorta is 2.5 cm. Mild-to-moderate calcified plaques in arch of aorta. Pulmonary arterial system: [Right pulmonary artery measures 2.55 cm in diameter. The left pulmonary artery measures 2.9 cm in diameter. The study is noncontrast  study. ----------------------------------------------------------  Lung parenchyma, bilateral:        The study shows marked pulmonary emphysema in upper lobes of both lungs, right  greater than left. There are bullous emphysematous changes in right upper lobe. In the middle zones of both lungs there are moderate emphysematous changes. In  the lower zones of both lung there are mild to moderate emphysematous changes  noted. The study also demonstrates mild generalized interstitial fibrotic changes in  both lungs. At the medial aspect of right upper lobe, right perihilar area and at the  posterior aspect of posterior segment of right upper lobe there are focal  fibrotic changes, similar to previous study. In the lower portion of right  middle lobe there are moderate fibrotic/chronic atelectatic changes, which are  similar to previous study. At the upper anterior aspect of right lower lobe there are mild focal discoid  atelectatic changes and fibrotic changes. In rest of right lower lobe there is  no other focal abnormality or acute process identified. There are emphysematous changes in left upper lobe. At the posterior aspect of  base of left upper lobe there are mild focal fibrotic or minimal chronic  atelectatic changes noted. In the lower lingula of left lung there are focal  patchy fibrotic/atelectatic changes, similar to previous study.     In anterior segment of left lower lobe there are focal mild bullous  emphysematous changes with surrounding mild fibrotic changes, similar to  previous study. There is no definable pneumonic infiltrates or confluent  atelectasis in lungs at this time. There is no definable lung abscess or cavitary pneumonia. There is no finding suspicious for acute pulmonary tuberculosis.    ---------------------------------------------------------------  Pleural spaces:[ No evidence of pleural effusion]  . [ No evidence of pleural thickening or pleural plaque formation or pleural  calcification. No evidence of pneumothorax. Bony thorax:[Mild to moderate multilevel spondylosis in thoracic spine. Visualized portion of liver:[ Unremarkable.]    Gallbladder: There are gallstones in visualized upper portion of gallbladder. Spleen: [ No diagnostic Finding]  Tiny calcified granulomas. Adrenal glands : [no diagnostic finding]    Any other pertinent finding in upper abdomen:[ None.]    Hiatal hernia:[ Small hiatal hernia. Evidence of previous gastric bypass  surgery. .]    Esophagus: [ No obvious diagnostic finding]    Soft tissues of chest wall: No diagnostic finding.    ---------------------------------------------------------------       Impression IMPRESSION:    1. No evidence of cavitary pneumonia or any other type of pneumonia or acute  process in lungs at this time. 2.  No evidence of active pulmonary tuberculosis. 3.  Redemonstration of marked pulmonary emphysema in upper lungs bilaterally, right  greater than left. Mild-to-moderate pulmonary emphysema in mid and lower lungs  bilaterally, as before. 4.  There are scattered fibrotic changes in both lungs, similar to previous study. 5.  No evidence of discrete mass or nodule in either lung. 6.  No evidence of pathologic lymphadenopathy or interval change in mediastinum. 7.  Cholelithiasis. 9.  Additional findings as above in body of report.               Outside PFT: Severe obstruction with reduced DLCO    ASSESSMENT and PLAN  Encounter Diagnoses   Name Primary?  Pulmonary hypertension (HCC) Yes    COPD, [facility]     Hypoxemia requiring supplemental oxygen     ESLA (mycobacterium avium-intracellulare) (HCC)      Pt with Pulmonary HTN Grp 1 per treatment history, however pt also with advanced COPD, so possibly Grp 3 also. Pt was evaluated for possible transplant but could not go forward with it due to family and logistic problems. Reviewed ambulatory oxymetry with pt, will increase flow to 6 LNC with medallion. Order written. Pt also with old and malfunctioning nebulizer machine, order written for replacement. Continue rescue Albuterol and continue LABA/ICS and LAMA. Inhaler technique reinforced to pt. Will continue current Letairis and Sildenafil until outside records are available. Defer  transplant evaluation. CT results reviewed with pt, no evidence of cavitary lung disease.     RTC 6 months

## 2018-06-07 NOTE — MR AVS SNAPSHOT
[address] [facility], [address] [address] 
[phone] Patient: Riki Osorio MRN: [id number] SYN:9/[birth year] Visit Information Date & Time Provider Department Dept. Phone Encounter #  
 6/7/2018  2:30 PM Adry Bazan MD [facility] [place] [id number] Follow-up Instructions Return in about 6 months (around 12/7/2018). Routing History Follow-up and Disposition History Your Appointments 10/12/2018 10:40 AM  
Follow Up with Caryn Rowe MD  
[facility] Appt Note: 1 year follow up [place] [address] [address] [address] [phone] [address] [place] [address] [address] Upcoming Health Maintenance Date Due Hepatitis C Screening 1955 DTaP/Tdap/Td series (1 - Tdap) 5/5/1974 FOBT Q 1 YEAR AGE 50-75 5/5/2003 ZOSTER VACCINE AGE 60> 3/5/2013 GLAUCOMA SCREENING Q2Y 5/5/2018 Bone Densitometry (Dexa) Screening 5/5/2018 MEDICARE YEARLY EXAM 5/7/2018 Influenza Age 5 to Adult 8/1/2018 BREAST CANCER SCRN MAMMOGRAM 2/15/2020 Pneumococcal 65+ Low/Medium Risk (2 of 2 - PPSV23) 1/11/2021 Allergies as of 6/7/2018  Review Complete On: 6/7/2018 By: Shauna Macias LPN No Known Allergies Current Immunizations  Reviewed on 11/30/2016 Name Date Influenza Vaccine 9/1/2015 Influenza Vaccine (Quad) PF 1/11/2016  5:14 PM  
 Pneumococcal Conjugate (PCV-13) 5/9/2016 11:30 AM  
 Pneumococcal Polysaccharide (PPSV-23) 1/11/2016  5:25 PM  
  
 Not reviewed this visit You Were Diagnosed With   
  
 Codes Comments Pulmonary hypertension ([facility]    -  Primary ICD-10-CM: I27.20 ICD-9-CM: 416.8 COPD, severe ([facility]     ICD-10-CM: J44.9 ICD-9-CM: 397 Hypoxemia requiring supplemental oxygen     ICD-10-CM: R09.02, Z99.81 ICD-9-CM: 799.02   
 ELSA (mycobacterium avium-intracellulare) ([facility]     ICD-10-CM: A31.0 ICD-9-CM: 031.0 Vitals BP Pulse Temp Resp Height(growth percentile) Weight(growth percentile) 116/64 (BP 1 Location: Left arm, BP Patient Position: Sitting) 92 98.2 °F (36.8 °C) (Oral) 18 5' 5\" (1.651 m) 179 lb (81.2 kg) SpO2 BMI OB Status Smoking Status 95% 29.79 kg/m2 Postmenopausal Former Smoker BMI and BSA Data Body Mass Index Body Surface Area  
 29.79 kg/m 2 1.93 m 2 Preferred Pharmacy Pharmacy Name Phone [address]. [phone] Your Updated Medication List  
  
   
This list is accurate as of 6/7/18  3:43 PM.  Always use your most recent med list.  
  
  
  
  
 albuterol 90 mcg/actuation inhaler Commonly known as:  PROVENTIL HFA, VENTOLIN HFA, PROAIR HFA Take 2 Puffs by inhalation every four (4) hours as needed for Wheezing or Shortness of Breath. albuterol-ipratropium 2.5 mg-0.5 mg/3 ml Nebu Commonly known as:  DUO-NEB  
3 mL by Nebulization route every six (6) hours as needed. Ambrisentan 5 mg tablet Commonly known as:  LETAIRIS Take 5 mg by mouth daily. Indications: PULMONARY ARTERIAL HYPERTENSION  
  
 b complex vitamins tablet Commonly known as:  B-COMPLEX Take 1 Tab by mouth daily. benazepril 20 mg tablet Commonly known as:  LOTENSIN Take 20 mg by mouth daily. * budesonide-formoterol 80-4.5 mcg/actuation Hfaa Commonly known as:  SYMBICORT Take 2 Puffs by inhalation two (2) times a day. * budesonide-formoterol 80-4.5 mcg/actuation Hfaa Commonly known as:  SYMBICORT Take 1 Puff by inhalation two (2) times a day. cholecalciferol 50,000 unit capsule Commonly known as:  VITAMIN D3 Take  by mouth every seven (7) days. DULoxetine 60 mg capsule Commonly known as:  CYMBALTA Take 60 mg by mouth daily. fluticasone 50 mcg/actuation nasal spray Commonly known as:  Beverlyn Maker 2 Sprays by Both Nostrils route daily. furosemide 40 mg tablet Commonly known as:  LASIX Take 1 Tab by mouth daily. gabapentin 300 mg capsule Commonly known as:  NEURONTIN Take 300 mg by mouth three (3) times daily. OXYGEN-AIR DELIVERY SYSTEMS  
6 L by Does Not Apply route. Uses 4 l when out  
  
 oxymetazoline 0.05 % nasal spray Commonly known as:  AFRIN  
2 Sprays two (2) times a day. pravastatin 20 mg tablet Commonly known as:  PRAVACHOL Take 20 mg by mouth nightly. sildenafil (antihypertensive) 20 mg tablet Commonly known as:  REVATIO Take 1 Tab by mouth three (3) times daily. tiotropium 18 mcg inhalation capsule Commonly known as:  [address] Take 1 Cap by inhalation daily. * Notice: This list has 2 medication(s) that are the same as other medications prescribed for you. Read the directions carefully, and ask your doctor or other care provider to review them with you. Prescriptions Sent to Pharmacy Refills  
 albuterol-ipratropium (DUO-NEB) 2.5 mg-0.5 mg/3 ml nebu 5 Sig: 3 mL by Nebulization route every six (6) hours as needed. Class: Normal  
 Pharmacy: [facility] [address] SAVAGE SIGALA [address] [place] [address].  #: [phone] Route: Nebulization We Performed the Following AMB SUPPLY ORDER [[id number] Custom] Comments:  
 TO:   
FAX:   
 
Please provide the followin. Stationary O2 Concentrator @ 6 L via nasal cannula with medallion for round the clock. 2. Nebulizer machine, supplies and replacement 2. Portable Gas O2 3. Test Patient for Lightweight Portable Conserving Device @ 6 L with medallion, Titrate to 91% or  Better. Length of Need: 99 
 
______________________________________________ Signature                                                              Date Follow-up Instructions Return in about 6 months (around 2018). To-Do List   
 06/08/2018 2:30 PM  
  Appointment with [address] at [place] [address] ([phone])  
  
 06/11/2018 2:30 PM  
  Appointment with [address] at [place] [address] ([phone])  
  
 06/13/2018 2:30 PM  
  Appointment with [address] at [place] [address] ([phone])  
  
 06/15/2018 2:30 PM  
  Appointment with [address] at [place] [address] ([phone])  
  
 06/18/2018 2:30 PM  
  Appointment with [address] at [place] [address] ([phone])  
  
 06/20/2018 2:30 PM  
  Appointment with [address] at [place] [address] ([phone])  
  
 06/22/2018 2:30 PM  
  Appointment with [address] at [place] [address] ([phone])  
  
 06/25/2018 2:30 PM  
  Appointment with [address] at [place] [address] ([phone])  
  
 06/27/2018 2:30 PM  
  Appointment with [address] at [place] [address] ([phone])  
  
 06/29/2018 2:30 PM  
  Appointment with [address] at [place] [address] ([phone])  
  
 07/02/2018 2:30 PM  
  Appointment with [address] at [place] [address] ([phone])  
  
 07/04/2018 2:30 PM  
  Appointment with [address] at [place] [address] ([phone])  
  
 07/06/2018 2:30 PM  
  Appointment with [address] at [place] [address] ([phone])  
  
 07/09/2018 2:30 PM  
  Appointment with [address] at [place] [address] ([phone])  
  
 07/11/2018 2:30 PM  
  Appointment with [address] at [place] [address] ([phone])  
  
 07/13/2018 2:30 PM  
  Appointment with [address] at [place] [address] ([phone])  
  
 07/16/2018 2:30 PM  
  Appointment with [address] at [place] [address] ([phone])  
  
 07/18/2018 2:30 PM  
  Appointment with [address] at [place] [address] ([phone])  
  
 07/20/2018 2:30 PM  
  Appointment with [address] at [place] [address] ([phone]) [facility]! Dear Tere Swain: Thank you for requesting a [employer] account. Our records indicate that you already have an active [employer] account. You can access your account anytime at [email or web address] Did you know that you can access your hospital and ER discharge instructions at any time in Covercake? You can also review all of your test results from your hospital stay or ER visit. Additional Information If you have questions, please visit the Frequently Asked Questions section of the [employer] website at [email or web address] Remember, Covercake is NOT to be used for urgent needs. For medical emergencies, dial 911. Now available from your iPhone and Android! Please provide this summary of care documentation to your next provider. Your primary care clinician is listed as [address] [facility]. If you have any questions after today's visit, please call [phone].

## 2018-06-08 ENCOUNTER — APPOINTMENT (OUTPATIENT)
Dept: PULMONOLOGY | Age: 63
End: 2018-06-08
Payer: MEDICARE

## 2018-06-08 ENCOUNTER — HOSPITAL ENCOUNTER (OUTPATIENT)
Dept: PULMONOLOGY | Age: 63
Discharge: HOME | End: 2018-06-08
Payer: MEDICARE

## 2018-06-08 PROCEDURE — G0237 THERAPEUTIC PROCD STRG ENDUR: HCPCS

## 2018-06-08 NOTE — PROGRESS NOTES
[facility]  Respiratory Therapy Flowsheet      Chandana Senior  1955       Patient's carry-over of treatment delivered by: Patient reports using pursed lip breathing at home. Objective Daily Findings    Comments:No distress noted.      Respiratory Muscle Functioning/Exercise Conditioning/Strengthening Session:    Time In: 1430  Time Out::1621  Session Number: 9  O2 with Therapy: 6 L/min    Pre SPO2 93  Pre HR:97  Pre BP: 116/58    RR: 18    Wt: 178.4  Temp: 98.0   Post SPO2: 96 Post HR: 90  Post BP: 120/60    Self Care Home Management Instruction/Education    Patient Self Monitored Activity Time In:1430 Time Out:1520     [] - Breathing Retraining   [] - Smoking Cessation   [] - Exercise Concepts   [] - Nutrition    [x] - Collaborative Self-Management  [] - Secretion Clearance and Home Management    [] - Body Mechanics & Energy Conservation/Work Simplification (EC/WS) Techniques    [] - COPD & Lung Disease   [] - Travel & Environment  [] - Relaxation Techniques   [] - Medication & Side Effects   [] - Medication Adverse Interactions   [] - Oxygen Training   [] - Metered Dose Inhaler (MDI)   [] - Stress Management   [] - Sexuality & Lung Disease  [] - Fall Risk Precautions      Patients response to Education [x] - Patient actively participated in education  [] - Patient disengaged during education     [x] - Able to recite main objectives  [] - Unable to recite main objectives [x] - Able to demonstrate    [] - NA  [] - Unable to demonstrate       Comments: Armando cough              Individual Therapy         Goal    Actual                      During Therapy                 Post-Therapy     % SpO2 HR PDS  1-10 PES  1-10 Pain  1 - 10 % SpO2 HR PDS  1-10 PES  1-10 Pain 0 - 10   Treadmill                 Bike               Stepper L6  30 min L5  30 min 96 90 1 2 1 95 91 1 1 1   Arm Ergometer 20 llanes  30 min 15 llanes  25 min 91 80 2 4 1 94 89 1 1 1   Rower               Weight Training Therabands               Weighted DB               IMT 13 cm  20 min 12 cm  15 min 97 86 1 2 1 97 89 1 1 1   IS 2000 ml  25 min 1750 ml  20 min 98 87 1 2 1 98 86 1 1 1   PEP 13 cm  20 min 12 cm  15 min 98 89 1 2 1 98 87 1 1 1     Patient's response to therapy:     [] - ? Dyspnea    [] - ? Fatigue    [] - ? Heart rate    [] - ? Wheeze    [] - ? Cough     [] - ? Pain; location: _____________________________  [] - ? SpO2     [] - ? Pacing     [] - ? Pursed lip breathing   [] - ? Diaphragmatic breathing     [] - Experienced no problems; no cuing required.   [x] - Good effort     [] - Fair effort     [] - Poor effort    [x] - Good motivation      [] - Fair motivation     [] - Poor motivation    Comments  :    Assessment  Patient's response:   [] Patient unable to progress towards LTGs due to:  [] - Illness     [] - Weakness / debility [] - Poor effort     [] - Poor Motivation     [] - Poor Attendance        [x] Patient progressing towards LTGs: evident by:     [] Decreased Dyspnea on exertion   [] Decreased Fatigue   [] Increased PLB        [x] Increased DB    [] Decreased pain    [] Increased Pacing     [] Reduced work of breathing, increased SpO2, and decreased PDS, PES, and PPS  [] Improved ventilatory muscle (diaphragm) strength and endurance    [] Improved Aerobic capacity and endurance     [] Cigarette reduction / Smoking cessation participation      [] Other:              Plan:       [x] - Continue as written   OR    [] - Adjust treatment plan as follows:       Billing:    # __4___     # _____     # _____ Other       __55___ Minutes of Individualized Treatment ()        _____ Minutes of Group Treatment (Yesica Morris)        _____ Minutes of Other (________________________________)             Physician supervision provided this date of service by: Dr. Edward Yeh       Therapist Signature:Madiha Tirado RT    Therapist PRINTED Name and Credential: RT Leandra    6/8/2018  2:37 PM

## 2018-06-11 ENCOUNTER — HOSPITAL ENCOUNTER (OUTPATIENT)
Dept: PULMONOLOGY | Age: 63
Discharge: HOME | End: 2018-06-11
Payer: MEDICARE

## 2018-06-11 ENCOUNTER — APPOINTMENT (OUTPATIENT)
Dept: PULMONOLOGY | Age: 63
End: 2018-06-11
Payer: MEDICARE

## 2018-06-11 PROCEDURE — G0237 THERAPEUTIC PROCD STRG ENDUR: HCPCS

## 2018-06-11 NOTE — PROGRESS NOTES
[address] [place] [address]   Respiratory Therapy Flowsheet      Kim Fink  1955       Patient's carry-over of treatment delivered by: Patient reports that she feels tired today. Objective Daily Findings    Comments:No distress noted.      Respiratory Muscle Functioning/Exercise Conditioning/Strengthening Session:    Time In: 1430  Time Out::1615  Session Number: 10  O2 with Therapy: 6 L/min    Pre SPO2 97  Pre HR:99  Pre BP: 118/70    RR: 18    Wt: 179.2  Temp: 98.2   Post SPO2: 97 Post HR: 91  Post BP: 120/68    Self Care Home Management Instruction/Education    Patient Self Monitored Activity Time In:1430 Time Out:1515     [] - Breathing Retraining   [] - Smoking Cessation   [] - Exercise Concepts   [] - Nutrition    [] - Collaborative Self-Management  [] - Secretion Clearance and Home Management    [] - Body Mechanics & Energy Conservation/Work Simplification (EC/WS) Techniques    [] - COPD & Lung Disease   [] - Travel & Environment  [] - Relaxation Techniques   [] - Medication & Side Effects   [] - Medication Adverse Interactions   [] - Oxygen Training   [] - Metered Dose Inhaler (MDI)   [] - Stress Management   [] - Sexuality & Lung Disease  [] - Fall Risk Precautions      Patients response to Education [] - Patient actively participated in education  [] - Patient disengaged during education     [] - Able to recite main objectives  [] - Unable to recite main objectives [] - Able to demonstrate    [] - NA  [] - Unable to demonstrate       Comments: Adequate Hydration              Individual Therapy         Goal    Actual                      During Therapy                 Post-Therapy     % SpO2 HR PDS  1-10 PES  1-10 Pain  1 - 10 % SpO2 HR PDS  1-10 PES  1-10 Pain 0 - 10   Treadmill   1.5 mph  20 min 1.1 mph  15 min 94 108 1 4 1 95 104 1 1 1   Bike               Stepper L6  20 min L5  15 min 96 93 1 2 1 97 91 1 1 1   Arm Ergometer 20 llanes  30 min 15 llanes  25 min 98 95 1 4 1 97 94 1 1 1   Rower Weight Training               Therabands               Weighted DB               IMT 14 cm  20 min 13 cm  15 min 95 85 1 2 1 95 87 1 1 1   IS 1750 ml  25 min 1500 ml  15 min 98 96 1 2 1 98 95 1 1 1   PEP 14 cm  20 min 13 cm  15 min 97 87 1 2 1 96 86 1 1 1     Patient's response to therapy:     [] - ? Dyspnea    [] - ? Fatigue    [] - ? Heart rate    [] - ? Wheeze    [] - ? Cough     [] - ? Pain; location: _____________________________  [] - ? SpO2     [] - ? Pacing     [] - ? Pursed lip breathing   [] - ? Diaphragmatic breathing     [] - Experienced no problems; no cuing required.   [x] - Good effort     [] - Fair effort     [] - Poor effort    [x] - Good motivation      [] - Fair motivation     [] - Poor motivation    Comments  :    Assessment  Patient's response:   [] Patient unable to progress towards LTGs due to:  [] - Illness     [] - Weakness / debility [] - Poor effort     [] - Poor Motivation     [] - Poor Attendance        [] Patient progressing towards LTGs: evident by:     [] Decreased Dyspnea on exertion   [] Decreased Fatigue   [x] Increased PLB        [] Increased DB    [] Decreased pain    [] Increased Pacing     [] Reduced work of breathing, increased SpO2, and decreased PDS, PES, and PPS  [] Improved ventilatory muscle (diaphragm) strength and endurance    [] Improved Aerobic capacity and endurance     [] Cigarette reduction / Smoking cessation participation      [] Other:              Plan:       [x] - Continue as written   OR    [] - Adjust treatment plan as follows:       Billing:    # _4____     # _____     # _____ Other       __55___ Minutes of Individualized Treatment ()        _____ Minutes of Group Treatment (Nicole Ramon)        _____ Minutes of Other (________________________________)             Physician supervision provided this date of service by: Dr. Poonam Sotelo       Therapist Signature:Madiha Huffman RT    Therapist PRINTED Name and Credential: Melodie Howard RT    6/11/2018  2:37 PM

## 2018-06-12 ENCOUNTER — TELEPHONE (OUTPATIENT)
Dept: PULMONOLOGY | Age: 63
End: 2018-06-12

## 2018-06-12 ENCOUNTER — APPOINTMENT (OUTPATIENT)
Dept: PULMONOLOGY | Age: 63
End: 2018-06-12
Payer: MEDICARE

## 2018-06-12 NOTE — TELEPHONE ENCOUNTER
[address] HOME CALLED([phone]). NEEDS TO TALK TO NURSE REGARDING MED LIST AND NOTES. PLEASE CALL.

## 2018-06-13 ENCOUNTER — HOSPITAL ENCOUNTER (OUTPATIENT)
Dept: PULMONOLOGY | Age: 63
Discharge: HOME | End: 2018-06-13
Payer: MEDICARE

## 2018-06-13 ENCOUNTER — APPOINTMENT (OUTPATIENT)
Dept: PULMONOLOGY | Age: 63
End: 2018-06-13
Payer: MEDICARE

## 2018-06-13 PROCEDURE — G0237 THERAPEUTIC PROCD STRG ENDUR: HCPCS

## 2018-06-13 NOTE — PROGRESS NOTES
[facility]  Respiratory Therapy Flowsheet      Cristino Malcolm  1955       Patient's carry-over of treatment delivered by: Patient reports that she feels more energetic. Objective Daily Findings    Comments:No distress noted. Respiratory Muscle Functioning/Exercise Conditioning/Strengthening Session:    Time In: 1440  Time Out::1621  Session Number: 11  O2 with Therapy: 4-6 L/min    Pre SPO2 96  Pre HR:109  Pre BP: 138/83    RR: 20    Wt: 179.1  Temp: 98.2   Post SPO2: 99 Post HR: 83  Post BP: 128/74    Self Care Home Management Instruction/Education    Patient Self Monitored Activity Time In:1445 Time Out:1530     [] - Breathing Retraining   [] - Smoking Cessation   [] - Exercise Concepts   [] - Nutrition    [x] - Collaborative Self-Management  [] - Secretion Clearance and Home Management    [] - Body Mechanics & Energy Conservation/Work Simplification (EC/WS) Techniques    [] - COPD & Lung Disease   [] - Travel & Environment  [] - Relaxation Techniques   [] - Medication & Side Effects   [] - Medication Adverse Interactions   [] - Oxygen Training   [] - Metered Dose Inhaler (MDI)   [] - Stress Management   [] - Sexuality & Lung Disease  [] - Fall Risk Precautions      Patients response to Education [x] - Patient actively participated in education  [] - Patient disengaged during education     [x] - Able to recite main objectives  [] - Unable to recite main objectives [] - Able to demonstrate    [x] - NA  [] - Unable to demonstrate       Comments:                Individual Therapy         Goal    Actual                      During Therapy                 Post-Therapy     % SpO2 HR PDS  1-10 PES  1-10 Pain  1 - 10 % SpO2 HR PDS  1-10 PES  1-10 Pain 0 - 10   Treadmill   1.5 mph  30 min 1.1 mph  25 min 90 109 2 4 1 95 105 1 1 1   Bike               Stepper               Arm Ergometer 25 llanes  30 min 20 llanes  25 min 91 97 1 4 1 93 96 1 1 1   Rower               Weight Training Therabands               Weighted DB               IMT 14 cm  20 min 13 cm  15 min 95 86 1 2 1 95 84 1 1 1   IS 1750 ml  25 min 1500 ml  15 min 99 83 1 2 1 99 82 1 1 1   PEP 14 cm  20 min 13 cm  15 min 96 86 1 2 1 95 85 1 1 1     Patient's response to therapy:     [] - ? Dyspnea    [] - ? Fatigue    [] - ? Heart rate    [] - ? Wheeze    [] - ? Cough     [] - ? Pain; location: _____________________________  [x] - ? SpO2     [] - ? Pacing     [] - ? Pursed lip breathing   [] - ? Diaphragmatic breathing     [] - Experienced no problems; no cuing required.   [x] - Good effort     [] - Fair effort     [] - Poor effort    [x] - Good motivation      [] - Fair motivation     [] - Poor motivation    Comments  :    Assessment  Patient's response:   [] Patient unable to progress towards LTGs due to:  [] - Illness     [] - Weakness / debility [] - Poor effort     [] - Poor Motivation     [] - Poor Attendance        [x] Patient progressing towards LTGs: evident by:     [x] Decreased Dyspnea on exertion   [] Decreased Fatigue   [x] Increased PLB        [] Increased DB    [] Decreased pain    [] Increased Pacing     [] Reduced work of breathing, increased SpO2, and decreased PDS, PES, and PPS  [] Improved ventilatory muscle (diaphragm) strength and endurance    [] Improved Aerobic capacity and endurance     [] Cigarette reduction / Smoking cessation participation      [] Other:              Plan:       [x] - Continue as written   OR    [] - Adjust treatment plan as follows:       Billing:    # _3____     # _____     # _____ Other       _50____ Minutes of Individualized Treatment ()        _____ Minutes of Group Treatment (Dennise Garcia)        _____ Minutes of Other (________________________________)             Physician supervision provided this date of service by: Dr. Marilou Vazquez       Therapist Signature:RT Ramirez    Therapist PRINTED Name and Credential: RT Kevin    6/13/2018  2:40 PM

## 2018-06-13 NOTE — TELEPHONE ENCOUNTER
Dr. Nan Lu called and spoke with Naomy Hou from [facility] this morning regarding patient stopping her Adcirca.

## 2018-06-14 ENCOUNTER — TELEPHONE (OUTPATIENT)
Dept: PULMONOLOGY | Age: 63
End: 2018-06-14

## 2018-06-14 ENCOUNTER — APPOINTMENT (OUTPATIENT)
Dept: PULMONOLOGY | Age: 63
End: 2018-06-14
Payer: MEDICARE

## 2018-06-14 NOTE — TELEPHONE ENCOUNTER
Radha Nielson from [address] called asking to speak with a nurse about pts medication list. Please call [phone]

## 2018-06-15 ENCOUNTER — APPOINTMENT (OUTPATIENT)
Dept: PULMONOLOGY | Age: 63
End: 2018-06-15
Payer: MEDICARE

## 2018-06-15 ENCOUNTER — HOSPITAL ENCOUNTER (OUTPATIENT)
Dept: PULMONOLOGY | Age: 63
Discharge: HOME | End: 2018-06-15
Payer: MEDICARE

## 2018-06-15 PROCEDURE — G0237 THERAPEUTIC PROCD STRG ENDUR: HCPCS

## 2018-06-15 NOTE — PROGRESS NOTES
[address] [place] [address]   Respiratory Therapy Flowsheet      Ana De La Cruz  1955       Patient's carry-over of treatment delivered by: Patient reports that she has been going since 8:00 this morning and she is very tired today. Objective Daily Findings    Comments:No distress noted    Respiratory Muscle Functioning/Exercise Conditioning/Strengthening Session:    Time In: 1430  Time Out::1601  Session Number: 12  O2 with Therapy: 4 L/min    Pre SPO2 95  Pre HR:100  Pre BP: 118/66    RR: 20    Wt: 179.7  Temp: 98.4   Post SPO2: 92 Post HR: 85  Post BP: 108/69    Self Care Home Management Instruction/Education    Patient Self Monitored Activity Time In:1515 Time Out:1600     [] - Breathing Retraining   [] - Smoking Cessation   [] - Exercise Concepts   [] - Nutrition    [] - Collaborative Self-Management  [] - Secretion Clearance and Home Management    [] - Body Mechanics & Energy Conservation/Work Simplification (EC/WS) Techniques    [] - COPD & Lung Disease   [] - Travel & Environment  [] - Relaxation Techniques   [] - Medication & Side Effects   [] - Medication Adverse Interactions   [] - Oxygen Training   [] - Metered Dose Inhaler (MDI)   [] - Stress Management   [] - Sexuality & Lung Disease  [] - Fall Risk Precautions      Patients response to Education [] - Patient actively participated in education  [] - Patient disengaged during education     [] - Able to recite main objectives  [] - Unable to recite main objectives [] - Able to demonstrate    [] - NA  [] - Unable to demonstrate       Comments:                Individual Therapy         Goal    Actual                      During Therapy                 Post-Therapy     % SpO2 HR PDS  1-10 PES  1-10 Pain  1 - 10 % SpO2 HR PDS  1-10 PES  1-10 Pain 0 - 10   Treadmill   1.5 mph  25 min 1.1 mph  25 min 98 106 2 4 1 94 108 1 1 1   Bike               Stepper               Arm Ergometer 20 llanes  30 min 15 llanes  20 min 97 101 1 4 1 97 99 1 1 1   Liberty Weight Training               Therabands               Weighted DB               IMT 15 cm  20 min 14 cm  15 min 97 96 1 2 1 97 95 1 1 1   IS 1750 ml  25 min 1250 ml  15 min 98 96 1 2 1 97 96 1 1 1   PEP 15 cm  20 min 14 cm  15 min 96 91 1 2 1 96 90 1 1 1     Patient's response to therapy:     [] - ? Dyspnea    [] - ? Fatigue    [] - ? Heart rate    [] - ? Wheeze    [] - ? Cough     [] - ? Pain; location: _____________________________  [] - ? SpO2     [] - ? Pacing     [] - ? Pursed lip breathing   [] - ? Diaphragmatic breathing     [] - Experienced no problems; no cuing required.   [x] - Good effort     [] - Fair effort     [] - Poor effort    [x] - Good motivation      [] - Fair motivation     [] - Poor motivation    Comments  :    Assessment  Patient's response:   [] Patient unable to progress towards LTGs due to:  [] - Illness     [] - Weakness / debility [] - Poor effort     [] - Poor Motivation     [] - Poor Attendance        [x] Patient progressing towards LTGs: evident by:     [x] Decreased Dyspnea on exertion   [] Decreased Fatigue   [] Increased PLB        [] Increased DB    [] Decreased pain    [] Increased Pacing     [] Reduced work of breathing, increased SpO2, and decreased PDS, PES, and PPS  [] Improved ventilatory muscle (diaphragm) strength and endurance    [] Improved Aerobic capacity and endurance     [] Cigarette reduction / Smoking cessation participation      [] Other:              Plan:       [x] - Continue as written   OR    [] - Adjust treatment plan as follows:       Billing:    # __6___     # _____     # _____ Other       __90___ Minutes of Individualized Treatment ()        _____ Minutes of Group Treatment (Jenene Coil)        _____ Minutes of Other (________________________________)             Physician supervision provided this date of service by: Dr. Elias Hawkins       Therapist Signature:Madiha Cervantes RT    Therapist PRINTED Name and Credential: Tonie Snyder RT    6/15/2018  2:28 PM

## 2018-06-18 ENCOUNTER — APPOINTMENT (OUTPATIENT)
Dept: PULMONOLOGY | Age: 63
End: 2018-06-18
Payer: MEDICARE

## 2018-06-18 ENCOUNTER — HOSPITAL ENCOUNTER (OUTPATIENT)
Dept: PULMONOLOGY | Age: 63
Discharge: HOME | End: 2018-06-18
Payer: MEDICARE

## 2018-06-18 PROCEDURE — G0237 THERAPEUTIC PROCD STRG ENDUR: HCPCS

## 2018-06-18 NOTE — TELEPHONE ENCOUNTER
[id number] asking for med list with added new Nebulizer medications: Performist 20mcg q12 hrs and Budesimide . 5 q12hrs. They received the faxed order for both from Dr. Jose Newsome on 6/12/18 but need the updated med list with those listed.    HTU:[phone]

## 2018-06-18 NOTE — PROGRESS NOTES
[facility]  Respiratory Therapy Flowsheet      Minerva Malcolm  1955       Patient's carry-over of treatment delivered by: Patient reports being able to walk with less shortness of breath. Objective Daily Findings    Comments:No distress noted.      Respiratory Muscle Functioning/Exercise Conditioning/Strengthening Session:    Time In: 1430  Time Out::1625  Session Number: 13  O2 with Therapy: 4-6 L/min    Pre SPO2 95  Pre HR:105  Pre BP: 127/77    RR: 18    Wt: 177.9  Temp: 98.2   Post SPO2: 93 Post HR: 95  Post BP: 130/74    Self Care Home Management Instruction/Education    Patient Self Monitored Activity Time In:1435 Time Out:1530     [] - Breathing Retraining   [] - Smoking Cessation   [] - Exercise Concepts   [] - Nutrition    [] - Collaborative Self-Management  [] - Secretion Clearance and Home Management    [] - Body Mechanics & Energy Conservation/Work Simplification (EC/WS) Techniques    [] - COPD & Lung Disease   [] - Travel & Environment  [] - Relaxation Techniques   [] - Medication & Side Effects   [] - Medication Adverse Interactions   [] - Oxygen Training   [] - Metered Dose Inhaler (MDI)   [] - Stress Management   [] - Sexuality & Lung Disease  [] - Fall Risk Precautions      Patients response to Education [x] - Patient actively participated in education  [] - Patient disengaged during education     [x] - Able to recite main objectives  [] - Unable to recite main objectives [] - Able to demonstrate    [x] - NA  [] - Unable to demonstrate       Comments: Congestive Heart Failure              Individual Therapy         Goal    Actual                      During Therapy                 Post-Therapy     % SpO2 HR PDS  1-10 PES  1-10 Pain  1 - 10 % SpO2 HR PDS  1-10 PES  1-10 Pain 0 - 10   Treadmill   1.3 mph  30 min 1.1 mph  30 min 90 121 1 2 1 94 118 1 1 1   Bike               Stepper               Arm Ergometer 30 llanes  30 min 25 llanes  25 min 93 98 1 4 2 94 99 1 1 1   Liberty Weight Training               Therabands               Weighted DB               IMT 16 cm  20 min 15 cm  15 min 97 107 1 2 1 97 109 1 1 1   IS 1750 ml  25 min 1750 ml  25 min 96 104 1 2 1 96 103 1 1 1   PEP 16 cm  20 min 15 cm  15 min 97 108 1 2 1 96 109 1 1 1     Patient's response to therapy:     [] - ? Dyspnea    [] - ? Fatigue    [] - ? Heart rate    [] - ? Wheeze    [] - ? Cough     [] - ? Pain; location: _____________________________  [] - ? SpO2     [] - ? Pacing     [] - ? Pursed lip breathing   [] - ? Diaphragmatic breathing     [] - Experienced no problems; no cuing required.   [x] - Good effort     [] - Fair effort     [] - Poor effort    [x] - Good motivation      [] - Fair motivation     [] - Poor motivation    Comments  :    Assessment  Patient's response:   [] Patient unable to progress towards LTGs due to:  [] - Illness     [] - Weakness / debility [] - Poor effort     [] - Poor Motivation     [] - Poor Attendance        [x] Patient progressing towards LTGs: evident by:     [x] Decreased Dyspnea on exertion   [x] Decreased Fatigue   [] Increased PLB        [] Increased DB    [] Decreased pain    [] Increased Pacing     [] Reduced work of breathing, increased SpO2, and decreased PDS, PES, and PPS  [] Improved ventilatory muscle (diaphragm) strength and endurance    [] Improved Aerobic capacity and endurance     [] Cigarette reduction / Smoking cessation participation      [] Other:              Plan:       [x] - Continue as written   OR    [] - Adjust treatment plan as follows:       Billing:    # _4____     # _____     # _____ Other       __55___ Minutes of Individualized Treatment ()        _____ Minutes of Group Treatment (Michelle)        _____ Minutes of Other (________________________________)             Physician supervision provided this date of service by: Dr. Akira Fu       Therapist Signature:Madiha Wilson RT    Therapist PRINTED Name and Credential: Yusef Le RT    6/18/2018  2:31 PM

## 2018-06-19 ENCOUNTER — APPOINTMENT (OUTPATIENT)
Dept: PULMONOLOGY | Age: 63
End: 2018-06-19
Payer: MEDICARE

## 2018-06-19 NOTE — TELEPHONE ENCOUNTER
Spoke with pharmacy. The pt called [facility] 6/6/18 and requested rx to replace the Symbicort that can be used with nebulizer. Pt is initiating these rx. Will call pt to discuss.

## 2018-06-19 NOTE — TELEPHONE ENCOUNTER
Per pt, she is still taking the Sybmicort prescribed by Dr. Eric Woods and doesn't want what [id number] was trying to switch her to.

## 2018-06-20 ENCOUNTER — APPOINTMENT (OUTPATIENT)
Dept: PULMONOLOGY | Age: 63
End: 2018-06-20
Payer: MEDICARE

## 2018-06-20 ENCOUNTER — HOSPITAL ENCOUNTER (OUTPATIENT)
Dept: PULMONOLOGY | Age: 63
Discharge: HOME | End: 2018-06-20
Payer: MEDICARE

## 2018-06-20 PROCEDURE — G0237 THERAPEUTIC PROCD STRG ENDUR: HCPCS

## 2018-06-20 NOTE — PROGRESS NOTES
[address] [place] [address]   Respiratory Therapy Flowsheet      Kevin Sterling  1955        Patient's carry-over of treatment delivered by: Patient reports less shortness of breath. Objective Daily Findings    Comments:No distress noted. Respiratory Muscle Functioning/Exercise Conditioning/Strengthening Session:    Time In: 1430  Time Out::1625  Session Number: 14  O2 with Therapy: 4-6 L/min    Pre SPO2 97  Pre HR:57  Pre BP: 118/62    RR: 18    Wt: 180  Temp: 98.0   Post SPO2: 97 Post HR: 84  Post BP: 120/70    Self Care Home Management Instruction/Education    Patient Self Monitored Activity Time In:1430 3173     [] - Breathing Retraining   [] - Smoking Cessation   [] - Exercise Concepts   [] - Nutrition    [] - Collaborative Self-Management  [] - Secretion Clearance and Home Management    [] - Body Mechanics & Energy Conservation/Work Simplification (EC/WS) Techniques    [] - COPD & Lung Disease   [] - Travel & Environment  [] - Relaxation Techniques   [] - Medication & Side Effects   [] - Medication Adverse Interactions   [] - Oxygen Training   [] - Metered Dose Inhaler (MDI)   [] - Stress Management   [] - Sexuality & Lung Disease  [] - Fall Risk Precautions      Patients response to Education [] - Patient actively participated in education  [] - Patient disengaged during education     [] - Able to recite main objectives  [] - Unable to recite main objectives [] - Able to demonstrate    [] - NA  [] - Unable to demonstrate       Comments:                Individual Therapy         Goal    Actual                      During Therapy                 Post-Therapy     % SpO2 HR PDS  1-10 PES  1-10 Pain  1 - 10 % SpO2 HR PDS  1-10 PES  1-10 Pain 0 - 10   Treadmill   1.5 mph  30 min 1.1 mph  30 min 97 57 1 4 1 97 58 1 1 1   Bike               Stepper L6  20 min L5  10 min 97 59 1 2 1 97 61 1 1 1   Arm Ergometer 20 llanes  30 min 15 llanes  20 min 95 54 1 2 1 95 56 1 1 1   Rower               Weight Training Therabands               Weighted DB               IMT 16 cm  20 min 15 cm  15 min 93 53 1 2 1 94 56 1 1 1   IS 1750 ml  25 min 1000 ml  25 min 96 61 1 3 1 96 64 1 1 1   PEP 16 cm  20 min 15 cm  15 min 96 62 1 2 1 95 67 1 1 1     Patient's response to therapy:     [] - ? Dyspnea    [] - ? Fatigue    [] - ? Heart rate    [] - ? Wheeze    [] - ? Cough     [] - ? Pain; location: _____________________________  [] - ? SpO2     [] - ? Pacing     [] - ? Pursed lip breathing   [] - ? Diaphragmatic breathing     [] - Experienced no problems; no cuing required.   [x] - Good effort     [] - Fair effort     [] - Poor effort    [x] - Good motivation      [] - Fair motivation     [] - Poor motivation    Comments  :    Assessment  Patient's response:   [] Patient unable to progress towards LTGs due to:  [] - Illness     [] - Weakness / debility [] - Poor effort     [] - Poor Motivation     [] - Poor Attendance        [x] Patient progressing towards LTGs: evident by:     [] Decreased Dyspnea on exertion   [] Decreased Fatigue   [] Increased PLB        [] Increased DB    [] Decreased pain    [] Increased Pacing     [] Reduced work of breathing, increased SpO2, and decreased PDS, PES, and PPS  [] Improved ventilatory muscle (diaphragm) strength and endurance    [] Improved Aerobic capacity and endurance     [] Cigarette reduction / Smoking cessation participation      [] Other:              Plan:       [x] - Continue as written   OR    [] - Adjust treatment plan as follows:       Billing:    # ___4__     # _____     # _____ Other       _60____ Minutes of Individualized Treatment ()        _____ Minutes of Group Treatment (Kayode Henriquez)        _____ Minutes of Other (________________________________)             Physician supervision provided this date of service by: Dr. Zheng Gallardo       Therapist Signature:Madiha Chavez RT    Therapist PRINTED Name and Credential: RT Jose Manuel    6/20/2018  2:34 PM

## 2018-06-21 ENCOUNTER — HOSPITAL ENCOUNTER (OUTPATIENT)
Dept: PULMONOLOGY | Age: 63
Discharge: HOME | End: 2018-06-21
Payer: MEDICARE

## 2018-06-21 ENCOUNTER — APPOINTMENT (OUTPATIENT)
Dept: PULMONOLOGY | Age: 63
End: 2018-06-21
Payer: MEDICARE

## 2018-06-21 PROCEDURE — G0237 THERAPEUTIC PROCD STRG ENDUR: HCPCS

## 2018-06-21 NOTE — PROGRESS NOTES
[facility]  Respiratory Therapy Flowsheet      Yinka Chavezper  1955       Patient's carry-over of treatment delivered by: Patient reports using pursed lip breathing at home with activity. Objective Daily Findings    Comments:No distress noted. Respiratory Muscle Functioning/Exercise Conditioning/Strengthening Session:    Time In: 1435  Time Out::1620  Session Number: 15  O2 with Therapy: 4-6 L/min    Pre SPO2 97  Pre HR:86  Pre BP: 110/67    RR: 16    Wt: 178  Temp: 98.4   Post SPO2: 96 Post HR: 64  Post BP: 113/67    Self Care Home Management Instruction/Education    Patient Self Monitored Activity Time In:1535 Time Out:1620     [] - Breathing Retraining   [] - Smoking Cessation   [] - Exercise Concepts   [] - Nutrition    [] - Collaborative Self-Management  [] - Secretion Clearance and Home Management    [] - Body Mechanics & Energy Conservation/Work Simplification (EC/WS) Techniques    [] - COPD & Lung Disease   [] - Travel & Environment  [] - Relaxation Techniques   [] - Medication & Side Effects   [] - Medication Adverse Interactions   [] - Oxygen Training   [] - Metered Dose Inhaler (MDI)   [] - Stress Management   [] - Sexuality & Lung Disease  [] - Fall Risk Precautions      Patients response to Education [x] - Patient actively participated in education  [] - Patient disengaged during education     [x] - Able to recite main objectives  [] - Unable to recite main objectives [] - Able to demonstrate    [x] - NA  [] - Unable to demonstrate       Comments:                Individual Therapy         Goal    Actual                      During Therapy                 Post-Therapy     % SpO2 HR PDS  1-10 PES  1-10 Pain  1 - 10 % SpO2 HR PDS  1-10 PES  1-10 Pain 0 - 10   Treadmill                 Bike               Stepper L7  30 min L6  30 min 96 75 1 2 1 96 74 1 1 1   Arm Ergometer 20 llanes  30 min 15 llanes  30 min 95 86 1 4 1 95 84 1 1 1   Rower               Weight Training Therabands               Weighted DB               IMT 16 cm  20 min 15 cm  15 min 97 72 1 2 1 97 70 1 1 1   IS 2000 ml  25 min 1200 ml  15 min 97 72 1 2 1 96 73 1 1 1   PEP 16 cm  20 min 15 cm  15 min 98 63 1 2 1 97 68 1 1 1     Patient's response to therapy:     [] - ? Dyspnea    [] - ? Fatigue    [] - ? Heart rate    [] - ? Wheeze    [] - ? Cough     [] - ? Pain; location: _____________________________  [] - ? SpO2     [] - ? Pacing     [] - ? Pursed lip breathing   [] - ? Diaphragmatic breathing     [] - Experienced no problems; no cuing required.   [x] - Good effort     [] - Fair effort     [] - Poor effort    [x] - Good motivation      [] - Fair motivation     [] - Poor motivation    Comments  :    Assessment  Patient's response:   [] Patient unable to progress towards LTGs due to:  [] - Illness     [] - Weakness / debility [] - Poor effort     [] - Poor Motivation     [] - Poor Attendance        [x] Patient progressing towards LTGs: evident by:     [x] Decreased Dyspnea on exertion   [] Decreased Fatigue   [] Increased PLB        [] Increased DB    [] Decreased pain    [] Increased Pacing     [] Reduced work of breathing, increased SpO2, and decreased PDS, PES, and PPS  [] Improved ventilatory muscle (diaphragm) strength and endurance    [] Improved Aerobic capacity and endurance     [] Cigarette reduction / Smoking cessation participation      [] Other:              Plan:       [x] - Continue as written   OR    [] - Adjust treatment plan as follows:       Billing:    # __7___     # _____     # _____ Other       __105___ Minutes of Individualized Treatment ()        _____ Minutes of Group Treatment (Samreen Lizama)        _____ Minutes of Other (________________________________)             Physician supervision provided this date of service by: Dr. Meryle Agent       Therapist Signature:Madiha Tracey RT    Therapist PRINTED Name and Credential: RT Stefani    6/21/2018  2:37 PM

## 2018-06-22 ENCOUNTER — APPOINTMENT (OUTPATIENT)
Dept: PULMONOLOGY | Age: 63
End: 2018-06-22
Payer: MEDICARE

## 2018-06-25 ENCOUNTER — TELEPHONE (OUTPATIENT)
Dept: PULMONOLOGY | Age: 63
End: 2018-06-25

## 2018-06-25 ENCOUNTER — APPOINTMENT (OUTPATIENT)
Dept: PULMONOLOGY | Age: 63
End: 2018-06-25
Payer: MEDICARE

## 2018-06-29 ENCOUNTER — TELEPHONE (OUTPATIENT)
Dept: PULMONOLOGY | Age: 63
End: 2018-06-29

## 2018-06-29 NOTE — TELEPHONE ENCOUNTER
Pulmonary Rehab note:    Ms. Kirk Stallworth came into our office today to inform us that she has received an injection in her hand that is causing numbness and will not be able keep her Pulmonary Rehab appointment with us today. She plans to return to class on Monday.      Chel Vázquez, RRT

## 2018-07-02 ENCOUNTER — TELEPHONE (OUTPATIENT)
Dept: PULMONOLOGY | Age: 63
End: 2018-07-02

## 2018-07-02 NOTE — TELEPHONE ENCOUNTER
Pulmonary Rehab called patient and left a message about her absence from the program. Additional attempts at contact will be made.      Thank you,  Amber Rivas

## 2018-07-06 ENCOUNTER — HOSPITAL ENCOUNTER (OUTPATIENT)
Dept: PULMONOLOGY | Age: 63
Discharge: HOME | End: 2018-07-06
Payer: MEDICARE

## 2018-07-06 PROCEDURE — G0237 THERAPEUTIC PROCD STRG ENDUR: HCPCS

## 2018-07-06 NOTE — PROGRESS NOTES
[address] [place] [address]   Respiratory Therapy Flowsheet      Kassie Ramos  1955       Patient's carry-over of treatment delivered by: Patient reports that she had pain in her neck on Monday which has resolved. Objective Daily Findings    Comments:No distress noted. Respiratory Muscle Functioning/Exercise Conditioning/Strengthening Session:    Time In: 1430  Time Out::1537  Session Number: 16  O2 with Therapy: 4-6 L/min    Pre SPO2 95  Pre HR:109  Pre BP: 118/70    RR: 18    Wt: 181.5  Temp: 97.7   Post SPO2: 96 Post HR: 102  Post BP: 128/78    Self Care Home Management Instruction/Education    Patient Self Monitored Activity Time In: Time Out:     [] - Breathing Retraining   [] - Smoking Cessation   [] - Exercise Concepts   [] - Nutrition    [] - Collaborative Self-Management  [] - Secretion Clearance and Home Management    [] - Body Mechanics & Energy Conservation/Work Simplification (EC/WS) Techniques    [] - COPD & Lung Disease   [] - Travel & Environment  [] - Relaxation Techniques   [] - Medication & Side Effects   [] - Medication Adverse Interactions   [] - Oxygen Training   [] - Metered Dose Inhaler (MDI)   [] - Stress Management   [] - Sexuality & Lung Disease  [] - Fall Risk Precautions      Patients response to Education [] - Patient actively participated in education  [] - Patient disengaged during education     [] - Able to recite main objectives  [] - Unable to recite main objectives [] - Able to demonstrate    [] - NA  [] - Unable to demonstrate       Comments:                Individual Therapy         Goal    Actual                      During Therapy                 Post-Therapy     % SpO2 HR PDS  1-10 PES  1-10 Pain  1 - 10 % SpO2 HR PDS  1-10 PES  1-10 Pain 0 - 10   Treadmill                 Bike               Stepper L6  30 min L5  30 min 96 99 1 2 1 96 98 1 1 1   Arm Ergometer 20 llanes  30 min 15 llanes  25 min 95 112 1 2 1 96 109 1 1 1   Rower               Weight Training Therabands               Weighted DB               IMT               IS                                Patient's response to therapy:     [] - ? Dyspnea    [] - ? Fatigue    [] - ? Heart rate    [] - ? Wheeze    [] - ? Cough     [] - ? Pain; location: _____________________________  [] - ? SpO2     [] - ? Pacing     [] - ? Pursed lip breathing   [] - ? Diaphragmatic breathing     [] - Experienced no problems; no cuing required.   [] - Good effort     [x] - Fair effort     [] - Poor effort    [] - Good motivation      [x] - Fair motivation     [] - Poor motivation    Comments  :    Assessment  Patient's response:   [] Patient unable to progress towards LTGs due to:  [] - Illness     [] - Weakness / debility [] - Poor effort     [] - Poor Motivation     [] - Poor Attendance        [x] Patient progressing towards LTGs: evident by:     [] Decreased Dyspnea on exertion   [] Decreased Fatigue   [x] Increased PLB        [] Increased DB    [] Decreased pain    [] Increased Pacing     [] Reduced work of breathing, increased SpO2, and decreased PDS, PES, and PPS  [] Improved ventilatory muscle (diaphragm) strength and endurance    [] Improved Aerobic capacity and endurance     [] Cigarette reduction / Smoking cessation participation      [] Other:              Plan:       [x] - Continue as written   OR    [] - Adjust treatment plan as follows:       Billing:    # __4___     # _____     # _____ Other       __55___ Minutes of Individualized Treatment ()        _____ Minutes of Group Treatment (Alex Li)        _____ Minutes of Other (________________________________)             Physician supervision provided this date of service by: Dr. Lux Rosen       Therapist Signature:RT Babatunde    Therapist PRINTED Name and Credential: Marylee Loges, RT    7/6/2018  2:28 PM

## 2018-07-09 ENCOUNTER — APPOINTMENT (OUTPATIENT)
Dept: PULMONOLOGY | Age: 63
End: 2018-07-09
Payer: MEDICARE

## 2018-07-11 ENCOUNTER — HOSPITAL ENCOUNTER (OUTPATIENT)
Dept: PULMONOLOGY | Age: 63
Discharge: HOME | End: 2018-07-11
Payer: MEDICARE

## 2018-07-11 PROCEDURE — G0237 THERAPEUTIC PROCD STRG ENDUR: HCPCS

## 2018-07-11 RX ORDER — SILDENAFIL CITRATE 20 MG/1
20 TABLET ORAL 3 TIMES DAILY
Qty: 90 TAB | Refills: 0 | Status: SHIPPED | OUTPATIENT
Start: 2018-07-11 | End: 2018-08-31 | Stop reason: SDUPTHER

## 2018-07-11 RX ORDER — BUDESONIDE AND FORMOTEROL FUMARATE DIHYDRATE 80; 4.5 UG/1; UG/1
1 AEROSOL RESPIRATORY (INHALATION) 2 TIMES DAILY
Qty: 1 INHALER | Refills: 5 | Status: SHIPPED | OUTPATIENT
Start: 2018-07-11 | End: 2020-01-08 | Stop reason: SDUPTHER

## 2018-07-11 NOTE — PROGRESS NOTES
[address] [place] [address]   Respiratory Therapy Flowsheet      Karen Reaves  1955       Patient's carry-over of treatment delivered by: reviewed PLB. Objective Daily Findings    Comments:    Respiratory Muscle Functioning/Exercise Conditioning/Strengthening Session:    Time In: 2:30  Time Out::3:30  Session Number: 17  O2 with Therapy: 6 L/min    Pre SPO2 96  Pre HR:104  Pre BP: 123/64    RR: 18    Wt: 181   Temp: 97.4   Post SPO2: 95 Post HR: 82  Post BP: 140/80    Self Care Home Management Instruction/Education    Patient Self Monitored Activity Time In:3:30 Time Out:4:25     [x] - Breathing Retraining   [] - Smoking Cessation   [] - Exercise Concepts   [] - Nutrition    [] - Collaborative Self-Management  [] - Secretion Clearance and Home Management    [] - Body Mechanics & Energy Conservation/Work Simplification (EC/WS) Techniques    [] - COPD & Lung Disease   [] - Travel & Environment  [] - Relaxation Techniques   [] - Medication & Side Effects   [] - Medication Adverse Interactions   [] - Oxygen Training   [] - Metered Dose Inhaler (MDI)   [] - Stress Management   [] - Sexuality & Lung Disease  [] - Fall Risk Precautions      Patients response to Education [x] - Patient actively participated in education  [] - Patient disengaged during education     [] - Able to recite main objectives  [] - Unable to recite main objectives [] - Able to demonstrate    [] - NA  [] - Unable to demonstrate       Comments:                Individual Therapy         Goal    Actual                      During Therapy                 Post-Therapy     % SpO2 HR PDS  1-10 PES  1-10 Pain  1 - 10 % SpO2 HR PDS  1-10 PES  1-10 Pain 0 - 10   Treadmill                 Bike               Stepper 30 min x L6 30 min x L5 95 104 1 2 1 95 112 1 2 1   Arm Ergometer 30 min x 20 llanes 30 min x 15 llanes 96 107 1 4 1 96 113 1 3 1   Rower               Weight Training               Therabands               Weighted DB               IMT IS                                Patient's response to therapy:     [] - ? Dyspnea    [] - ? Fatigue    [] - ? Heart rate    [] - ? Wheeze    [] - ? Cough     [] - ? Pain; location: _____________________________  [] - ? SpO2     [] - ? Pacing     [] - ? Pursed lip breathing   [] - ? Diaphragmatic breathing     [] - Experienced no problems; no cuing required. [x] - Good effort     [] - Fair effort     [] - Poor effort    [x] - Good motivation      [] - Fair motivation     [] - Poor motivation    Comments  :    Assessment  Patient's response:   [] Patient unable to progress towards LTGs due to:  [] - Illness     [] - Weakness / debility [] - Poor effort     [] - Poor Motivation     [] - Poor Attendance        [] Patient progressing towards LTGs: evident by:     [] Decreased Dyspnea on exertion   [x] Decreased Fatigue   [x] Increased PLB        [] Increased DB    [] Decreased pain    [] Increased Pacing     [] Reduced work of breathing, increased SpO2, and decreased PDS, PES, and PPS  [] Improved ventilatory muscle (diaphragm) strength and endurance    [] Improved Aerobic capacity and endurance     [] Cigarette reduction / Smoking cessation participation      [] Other:              Plan:       [x] - Continue as written   OR    [] - Adjust treatment plan as follows:       Billing:    # ___4__     # _____ Sammy    # _____ Other       __60___ Minutes of Individualized Treatment ()        _____ Minutes of Group Treatment (Sammy)        _____ Minutes of Other (________________________________)             Physician supervision provided this date of service by: Dr. Shilpa Avalos       Therapist Signature: PRAVEEN Jasso    Therapist PRINTED Name and Credential: PRAVEEN Jasso    7/11/2018  3:28 PM

## 2018-07-11 NOTE — TELEPHONE ENCOUNTER
Per pt's dtr, Kelsie Bosch, she needs refills on nebulizer meds, and Symbicort. She also needs Revatio, she has been out for a month and is now telling dtr that insurance won't cover it. Please call dtr back at [phone].

## 2018-07-13 ENCOUNTER — APPOINTMENT (OUTPATIENT)
Dept: PULMONOLOGY | Age: 63
End: 2018-07-13
Payer: MEDICARE

## 2018-07-16 ENCOUNTER — HOSPITAL ENCOUNTER (OUTPATIENT)
Dept: PULMONOLOGY | Age: 63
Discharge: HOME | End: 2018-07-16
Payer: MEDICARE

## 2018-07-16 PROCEDURE — G0237 THERAPEUTIC PROCD STRG ENDUR: HCPCS

## 2018-07-16 NOTE — PROGRESS NOTES
[facility]  Respiratory Therapy Flowsheet      Bolton Stage  1955       Patient's carry-over of treatment delivered by: Patient reports less shortness of breath with exertion. Objective Daily Findings    Comments:No distress noted.    Respiratory Muscle Functioning/Exercise Conditioning/Strengthening Session:    Time In: 1440  Time Out::1625  Session Number: 18  O2 with Therapy: 4-6 L/min    Pre SPO2 93  Pre HR:72  Pre BP: 130/72    RR: 18    Wt: 177.4  Temp: 99.2   Post SPO2: 99 Post HR: 76  Post BP: 116/70    Self Care Home Management Instruction/Education    Patient Self Monitored Activity Time In:1440 Time Out:1525     [] - Breathing Retraining   [] - Smoking Cessation   [] - Exercise Concepts   [] - Nutrition    [] - Collaborative Self-Management  [] - Secretion Clearance and Home Management    [] - Body Mechanics & Energy Conservation/Work Simplification (EC/WS) Techniques    [] - COPD & Lung Disease   [] - Travel & Environment  [] - Relaxation Techniques   [] - Medication & Side Effects   [] - Medication Adverse Interactions   [] - Oxygen Training   [] - Metered Dose Inhaler (MDI)   [] - Stress Management   [] - Sexuality & Lung Disease  [] - Fall Risk Precautions      Patients response to Education [x] - Patient actively participated in education  [] - Patient disengaged during education     [x] - Able to recite main objectives  [] - Unable to recite main objectives [] - Able to demonstrate    [x] - NA  [] - Unable to demonstrate       Comments: IMPORTANCE OF WATER AND ADEQUATE HYDRATION              Individual Therapy         Goal    Actual                      During Therapy                 Post-Therapy     % SpO2 HR PDS  1-10 PES  1-10 Pain  1 - 10 % SpO2 HR PDS  1-10 PES  1-10 Pain 0 - 10   Treadmill   1.2 mph  30 min 1.0 mph  30 min 96 104 1 4 1 96 102 1 1 1   Bike               Stepper               Arm Ergometer 25 llanes  30 min 20 llanes  25 min 93 97 1 5 1 94 96 1 1 1 Rower               Weight Training               Therabands               Weighted DB               IMT 15 cm  20 min 14 cm  15 min 99 82 1 2 1 99 83 1 1 1   IS 2000 ml  25 min 1500 ml  15 min 99 82 1 2 1 99 81 1 1 1   PEP 15 cm  20 min 14 cm  15 min 99 82 1 2 1 99 84 1 1 1     Patient's response to therapy:     [] - ? Dyspnea    [] - ? Fatigue    [] - ? Heart rate    [] - ? Wheeze    [] - ? Cough     [] - ? Pain; location: _____________________________  [] - ? SpO2     [] - ? Pacing     [] - ? Pursed lip breathing   [] - ? Diaphragmatic breathing     [] - Experienced no problems; no cuing required.   [x] - Good effort     [] - Fair effort     [] - Poor effort    [x] - Good motivation      [] - Fair motivation     [] - Poor motivation    Comments  :    Assessment  Patient's response:   [] Patient unable to progress towards LTGs due to:  [] - Illness     [] - Weakness / debility [] - Poor effort     [] - Poor Motivation     [] - Poor Attendance        [x] Patient progressing towards LTGs: evident by:     [] Decreased Dyspnea on exertion   [] Decreased Fatigue   [] Increased PLB        [] Increased DB    [] Decreased pain    [] Increased Pacing     [] Reduced work of breathing, increased SpO2, and decreased PDS, PES, and PPS  [] Improved ventilatory muscle (diaphragm) strength and endurance    [x] Improved Aerobic capacity and endurance     [] Cigarette reduction / Smoking cessation participation      [] Other:              Plan:       [x] - Continue as written   OR    [] - Adjust treatment plan as follows:       Billing:    # __4___     # _____     # _____ Other       __55___ Minutes of Individualized Treatment ()        _____ Minutes of Group Treatment (Della Dawson)        _____ Minutes of Other (________________________________)             Physician supervision provided this date of service by: Dr. Regla Verdin       Therapist Signature:Madiha Dejesus RT    Therapist PRINTED Name and Credential: Elisabeth Parrish, RT    7/16/2018  2:40 PM

## 2018-07-18 ENCOUNTER — HOSPITAL ENCOUNTER (OUTPATIENT)
Dept: PULMONOLOGY | Age: 63
Discharge: HOME | End: 2018-07-18
Payer: MEDICARE

## 2018-07-18 PROCEDURE — G0237 THERAPEUTIC PROCD STRG ENDUR: HCPCS

## 2018-07-18 NOTE — PROGRESS NOTES
[address] [place] [address]   Respiratory Therapy Flowsheet      Johnny Oreilly  1955       Patient's carry-over of treatment delivered by: Patient reports less shortness of breath with activity. Objective Daily Findings    Comments:No distress noted. Respiratory Muscle Functioning/Exercise Conditioning/Strengthening Session:    Time In: 1430  Time Out::1423  Session Number: 19  O2 with Therapy: 4-6 L/min    Pre SPO2 96  Pre HR:98  Pre BP: 128/81    RR: 18    Wt: 180.2  Temp: 98.0   Post SPO2: 93 Post HR: 89  Post BP: 135/78    Self Care Home Management Instruction/Education    Patient Self Monitored Activity Time In:1530 Time Out:1425     [] - Breathing Retraining   [] - Smoking Cessation   [] - Exercise Concepts   [] - Nutrition    [] - Collaborative Self-Management  [] - Secretion Clearance and Home Management    [] - Body Mechanics & Energy Conservation/Work Simplification (EC/WS) Techniques    [] - COPD & Lung Disease   [] - Travel & Environment  [] - Relaxation Techniques   [] - Medication & Side Effects   [] - Medication Adverse Interactions   [] - Oxygen Training   [] - Metered Dose Inhaler (MDI)   [] - Stress Management   [] - Sexuality & Lung Disease  [] - Fall Risk Precautions      Patients response to Education [] - Patient actively participated in education  [] - Patient disengaged during education     [] - Able to recite main objectives  [] - Unable to recite main objectives [] - Able to demonstrate    [] - NA  [] - Unable to demonstrate       Comments:                Individual Therapy         Goal    Actual                      During Therapy                 Post-Therapy     % SpO2 HR PDS  1-10 PES  1-10 Pain  1 - 10 % SpO2 HR PDS  1-10 PES  1-10 Pain 0 - 10   Treadmill   1.5 mph  30 min 1.2 mph  30 min 96 111 2 4 1 96 108 1 1 1   Bike L1  20 min L1  5 min 90 93 1 4 1 93 91 1 1 1   Stepper               Arm Ergometer 20 llanes  30 min 15 llanes  25 min 96 103 1 4 1 95 102 1 1 1   [employer] Weight Training               Therabands               Weighted DB               IMT 15 cm  20 min 14 cm  15 min 99 82 1 2 1 99 84 1 1 1   IS 2000 ml  25 min 1750 ml  20 min 99 84 1 2 1 99 86 1 1 1   PEP 15 cm  20 min 14 cm  15 min 99 81 1 2 1 99 83 1 1 1     Patient's response to therapy:     [] - ? Dyspnea    [] - ? Fatigue    [] - ? Heart rate    [] - ? Wheeze    [] - ? Cough     [] - ? Pain; location: _____________________________  [] - ? SpO2     [] - ? Pacing     [] - ? Pursed lip breathing   [] - ? Diaphragmatic breathing     [] - Experienced no problems; no cuing required.   [x] - Good effort     [] - Fair effort     [] - Poor effort    [x] - Good motivation      [] - Fair motivation     [] - Poor motivation    Comments  :    Assessment  Patient's response:   [] Patient unable to progress towards LTGs due to:  [] - Illness     [] - Weakness / debility [] - Poor effort     [] - Poor Motivation     [] - Poor Attendance        [x] Patient progressing towards LTGs: evident by:     [] Decreased Dyspnea on exertion   [] Decreased Fatigue   [] Increased PLB        [] Increased DB    [] Decreased pain    [] Increased Pacing     [] Reduced work of breathing, increased SpO2, and decreased PDS, PES, and PPS  [] Improved ventilatory muscle (diaphragm) strength and endurance    [x] Improved Aerobic capacity and endurance     [] Cigarette reduction / Smoking cessation participation      [] Other:              Plan:       [x] - Continue as written   OR    [] - Adjust treatment plan as follows:       Billing:    # _7____     # _____     # _____ Other       __110___ Minutes of Individualized Treatment ()        _____ Minutes of Group Treatment (Deann Trammell)        _____ Minutes of Other (________________________________)             Physician supervision provided this date of service by: Dr. Bam Perales        Therapist Signature:Madiha Milner RT    Therapist PRINTED Name and Credential: Owen Jeff, RT    7/18/2018  2:35 PM

## 2018-07-23 ENCOUNTER — TELEPHONE (OUTPATIENT)
Dept: PULMONOLOGY | Age: 63
End: 2018-07-23

## 2018-07-23 NOTE — TELEPHONE ENCOUNTER
Pulmonary Rehab called patient and left a message about attendance. Additional attempts at contact will be made.      Thank you,  Negrito Garcia

## 2018-07-24 RX ORDER — IPRATROPIUM BROMIDE AND ALBUTEROL SULFATE 2.5; .5 MG/3ML; MG/3ML
3 SOLUTION RESPIRATORY (INHALATION)
Qty: 360 NEBULE | Refills: 3 | Status: SHIPPED | OUTPATIENT
Start: 2018-07-24

## 2018-07-24 NOTE — TELEPHONE ENCOUNTER
There is a RF request with need for Diag. Code on [facility]. I note that this pt.is using [facility] on her Med List.   I contacted the pt. And no she isn't using [facility]. [facility] will be pended to Dr. García Kinney with a Diag. Code.

## 2018-07-27 ENCOUNTER — TELEPHONE (OUTPATIENT)
Dept: PULMONOLOGY | Age: 63
End: 2018-07-27

## 2018-07-27 NOTE — PROGRESS NOTES
[facility] Respiratory Therapy Patient Re-certifications Exercise (required) 90-Day Re-Assessment/POC Date/Time: 7/27/18 1605 Initials:   
 120-Day Re-Assessment/POC Date/Time:  
 
Initials:    150-Day Re-Assessment/POC Date/Time: 
Initials: *Please see recent flow sheets STG Status: 
Compliant with Exercise Prescription. [] Yes   [x] No       
If no, describe: poor attendance Participation in educational sessions. [x] Yes   [] No         
If no, describe: Other:  
 
Updated Exercise Prescription Aerobic Mode:  
[x]  Treadmill 
[] Bike 
[x] NuStep [x]  Arm Ergometer 
[]  Other:  
Frequency: 2-3 days/week Duration: Up to  30  min per mode Intensity:  RPE 1   to6   
  RPD: < 6 Strength training Mode: 
[]  Free Weights/Thera-bands Frequency: 2-3 days/week Duration: Up to  minutes Intensity: 1-2 sets; 12-15 reps; 
RPE:     to No free weights; pt reports shoulder pain Breathing Exercise Mode: 
[x] IMT/PEP [x] Weight Diaphragmatic Breath [x] Incentive Spirometer Frequency: 2-3 days/week Duration: Up to 30    min per mode Intensity:RPE:1-4 
RPD:<4 
 
 
LTG (to be achieved by D/C): 
[x] Exercise >100_ min with O2 Sat > 89      , RPD <    6    , 
[] Increase 6MW test by   100  Feet. *Please see recent flow sheets STG Status: 
Compliant with Exercise Prescription. [] Yes   [] No      
 If no, describe:   
 
Participation in educational sessions. [] Yes   [] No         
If no, describe: Other:    
 
Updated Exercise Prescription Aerobic Mode:  
[]  Treadmill 
[] Bike 
[] NuStep 
[]  Arm Ergometer 
[]  Other:  
Frequency: 2-3 days/week Duration: Up to    min per mode Intensity:  RPE    to   
  RPD: <   
 
 
Strength training Mode: 
[]  Free Weights/Thera-bands Frequency: 2-3 days/week Duration: Up to  minutes Intensity: 1-2 sets; 12-15 reps; 
RPE:     to    
 
 
Breathing Exercise Mode: 
[] IMT/PEP [] Weight Diaphragmatic Breath 
[] Incentive Spirometer Frequency: 2-3 days/week Duration: Up to     min per mode Intensity:RPE: 
 
 
LTG (to be achieved by D/C): 
[] Exercise > ___ min with O2 Sat >       , RPD <        , 
[] Increase 6MW test by     Feet. *Please see recent flow sheets STG Status: 
Compliant with Exercise Prescription. [] Yes   []No         
If no, describe:  
 
Participation in educational sessions 
 [] Yes   [] No         
If no, describe: Other: 
 
Updated Exercise Prescription Aerobic Mode:  
[]  Treadmill 
[]  Bike 
[]  NuStep 
[]  Arm Ergometer 
[]  Other:  
Frequency: 2-3 days/week Duration: Up to    min per mode Intensity:  RPE    to   
  RPD: <   
 
 
 
 
 
Strength training Mode: 
[]  Free Weights/Thera-bands Frequency:2-3 days/week Duration: Up to    minutes Intensity: 1-2 sets; 12-15 reps; 
RPE:     to    
 
 
 
 
 
 
 
Breathing Exercise Mode: 
[] IMT/PEP [] Weight Diaphragmatic Breath 
[] Incentive Spirometer Frequency: 2-3 days/week Duration: Up to    min per mode Intensity:RPE: 
 
 
 
 
 
 
LTG (to be achieved by D/C): 
[] Exercise > ___ min with O2 Sat >       , RPD <        , 
[] Increase 6MW test by     Feet. Nutrition (required) 90 Day Re-assessment/POC Date/Time: 7/27/18 1605 Initial: Day Re-assessment/POC Date/Time:  
Initial: 
 150 Day Re-assessment/POC Date/Time:   
Initial: *Please see recent flow sheets STG Status: 
Compliant with prescribed, specialized diet      % of the time per patient report. Participate in nutritional health classes 
 [x]  Yes   []  No         
If no, describe:   
 
Current RYP score: Other: Describe:  
 
Dietician Consult Completed 
     [x]  Yes   []  No   []  N/A 
 
UPOC: 
Pt will continue to:  
[]  Be compliant with prescribed, specialized diet [x] Participate in nutritional     health classes. [] Improve RYP score to: *RYP needs to be reassessed if still active as a STG 
 
 
 
LTG (to be achieved by D/C):  
 
[x] Patient will verbalize an understanding of a life-long adherence to a healthy diet and the impact on health condition(s). *Please see recent flow sheets STG Status: 
Compliant with prescribed, specialized diet      % of the time per patient report. Participate in nutritional health classes 
 []  Yes   []  No         
If no, describe:   
 
Current RYP score: Other:  Describe: 
 
Dietician Consult Completed 
   []  Yes   []  No   []  N/A 
 
UPOC: 
Pt will continue to:  
[]  Be compliant with prescribed, specialized diet 
 
[] Participate in nutritional     health classes. [] Improve RYP score to: *RYP needs to be reassessed if still active as a STG 
 
LTG (to be achieved by D/C):  
 
[] Patient will verbalize an understanding of a life-long adherence to a healthy diet and the impact on health condition(s). *Please see recent flow sheets STG Status: 
Compliant with prescribed, specialized diet    % of the time per patient report. Participate in nutritional health classes. [] Yes [] No         
If no, describe:   
 
Current RYP score: Other:  Describe: 
 
Dietician Consult Completed 
      [] Yes   [] No    []N/A 
 
 
UPOC: 
Pt will continue to:  
[]  Be compliant with prescribed, specialized diet. []  Participate in nutritional health classes. [] Improve RYP score to: *RYP needs to be reassessed if still active as a STG 
 
 
 
 
LTG (to be achieved by D/C):  
 
[] Patient will verbalize an understanding of a life-long adherence to a healthy diet and the impact on health condition(s). Psychosocial (required) 90 - Day Re-Assessment/POC Date/Time:7/27/18 1605 Initials: -Day Re-Assessment/POC Date/Time:  
Initials:  150-Day Re-Assessment/POC Date/Time: 
Initials: *Please see recent flow sheets STG Status: 
 
Manage and reduce stress per patient report.  
 [x]  Yes   []  No If no, describe: PHQ 9 current score is: pt unavailable. Will report on next flowsheet Average sleep hours: pt unavailable. Will report on next flowsheet Participate in emotional health class. [x]  Yes   []  No         
If no, describe:   
 
 
Decreased smoking by:     % UPOC: 
Pt will continue to:  
[x]  Manage and reduce stress per patient report. []  Manage depression as evidenced by PHQ 9 score of:  
 
[]  Manage sleep as evidenced by average sleep hours of:     
 
[x] Participate in emotional health class. []  Decrease smoking by    % Other: LTG (to be achieved by D/C): 
[x]  Use relaxation techniques when stressors are identified and verbalize coping skills and decrease vulnerability to stress. []  Maintain decreased smoking behaviors/smoking cessation. [x]  Improve PHQ-9 score 
[x]  Improve [employer] scores and maintain a healthy psychosocial well-being. *Please see recent flow sheets STG Status: 
 
Manage and reduce stress per patient report. 
 []  Yes   []  No         
If no, describe: PHQ 9 current score is:  
  
Average sleep hours: 
  
Participate in emotional health class.  
[]  Yes   []  No         
If no, describe:   
 
 
Decreased smoking by:     % UPOC: 
Pt will continue to:  
[]  Manage and reduce stress per patient report. []  Manage depression as evidenced by PHQ 9 score of:  
 
[]  Manage sleep as evidenced by average sleep hours of:     
 
[] Participate in emotional health class. []  Decrease smoking by    % Other: LTG (to be achieved by D/C): 
[]  Use relaxation techniques when stressors are identified and verbalize coping skills and decrease vulnerability to stress. []  Maintain decreased smoking behaviors/smoking cessation. []  Improve PHQ-9 score 
[]  Improve [employer] scores and maintain a healthy psychosocial well-being. *Please see recent flow sheets STG Status: 
 
Manage and reduce stress per patient report. []Yes    [] No         
If no, describe: PHQ 9 current score is:   
 
Average sleep hours:   
 
Participate in emotional health class. 
 []  Yes []  No         
If no, describe:  
 
 
Decreased smoking by:  % UPOC: 
Pt will continue to:  
[]  Manage and reduce stress per patient report. []  Manage depression as evidenced by PHQ 9 score of 
:  
[]  Manage sleep as evidenced by average sleep hours of:   
 
[]  Participate in emotional health class. []  Decrease smoking by  % Other: LTG (to be achieved by D/C): 
[]  Use relaxation techniques when stressors are identified and verbalize coping skills and decrease vulnerability to stress. []  Maintain decreased smoking behaviors/smoking cessation. []  Improve PHQ-9 score 
[]  Improve [facility] scores and maintain a healthy psychosocial well-being. Oxygen (required) 90-Day Re-Assessment/POC Date/Time: 7/27/18 1605 [address] 120-Day Re-Assessment/POC Date/Time:  
Initials:  150-Day Re-Assessment/POC Date/Time: 
 
Initials: *Please see recent flow sheets Changes in Prescription: 
 [x]  Yes  []  No    [] N/A If yes, describe:pt states physician increased her O2 to 6 L/min with activity. STG Status: 
[x]  Compliant with Oxygen prescription. [x]  Maintain oxygen saturations >  89   at rest. 
  
[x]   Maintain Oxygen saturations >  89    with exertion. [x]  Utilize  PLB 15    % of the time without prompting. [x]  Utilize DB  10   % of the time without prompting. [] Other: Describe: UPOC: 
Pt will continue to:  
[x] Be compliant with Oxygen prescription. [x]  Maintain Oxygen saturations > 89     at rest. 
[x]  Maintain Oxygen saturations > 89   with exertion. [x] Utilize PLB   20  % of the time without prompting. [x]  Utilize DB 10   % of the time without prompting. []  Other: LTG (to be achieved by D/C): 
 
[x] Patient will independently perform pursed-lip breathing 
[x] Patient will independently perform diaphragmatic breathing 
[x] Patient will follow Oxygen prescription *Please see recent flow sheets Changes in Prescription: 
 []  Yes  []  No    [] N/A If yes, describe: STG Status: 
[]  Compliant with Oxygen prescription. []  Maintain oxygen saturations >     at rest. 
  
[]   Maintain Oxygen saturations >      with exertion. []  Utilize  PLB     % of the time without prompting. []  Utilize DB     % of the time without prompting. [] Other: Describe: UPOC: 
Pt will continue to:  
[] Be compliant with Oxygen prescription. []  Maintain Oxygen saturations >       at rest. 
[]  Maintain Oxygen saturations >      with exertion. [] Utilize PLB     % of the time without prompting. []  Utilize DB    % of the time without prompting. []  Other: LTG (to be achieved by D/C): 
 
[] Patient will independently perform pursed-lip breathing 
[] Patient will independently perform diaphragmatic breathing 
[] Patient will follow Oxygen prescription *Please see recent flow sheets Changes in Prescription: 
 []  Yes  []  No    [] N/A If yes, describe: STG Status: 
[]  Compliant with Oxygen prescription. []  Maintain Oxygen saturations >   at rest. 
  
[]  Maintain Oxygen saturations >  with exertion. []  Utilize PLB        % of the time without prompting. [] Utilize DB      % of the time without prompting. UPOC: 
Pt will continue to:  
[] Be compliant with Oxygen prescription. []  Maintain Oxygen saturations >       at rest. 
[]  Maintain Oxygen saturations >      with exertion. [] Utilize PLB     % of the time without prompting. []  Utilize DB    % of the time without prompting. []  Other: LTG (to be achieved by D/C): 
 
[] Patient will independently perform pursed-lip breathing 
[] Patient will independently perform diaphragmatic breathing 
[] Patient will follow Oxygen prescription Core Measures Congestive Heart Failure 90-Day Re-Assessment/POC Date/Time:7/27/18 1605 Initials: -Day Re-Assessment/POC Date/Time: 
Initials:   150-Day Re-Assessment/POC Date/Time:  
Initials:   
 
 
 []  Not a Risk Factor at This Time. No plan of care required. *Please see recent flow sheets Newly diagnosed: 
 []  Yes  [x]  No    [] N/A If yes, describe date on onset, class and medication changes: STG Status/UPOC: 
Per patient report: Identifies own individual Baselines  (see left): 
[x]  Yes  []  No, continue Understands and able to act upon symptoms of worsening CHF:  
[x]  Yes   []  No, continue Weighs self daily:  
[x] Yes   []  No, continue Monitors BP at home:  
[] Yes   [x]  No, continue Compliant with medications:  
[x] Yes   []  No, continue Compliant w/ diet and sodium intake:  
[x] Yes   []  No, continue Monitors home fluidintake:  
[] Yes   [x]  No, continue Exercises daily: 
[] Yes   [x]  No, continue   
 
[] Other: LTG (to be achieved by D/C): 
[x] Able to verbalize individual baselines, signs and symptoms of worsening barth failure and appropriate self-management  
[x] Compliant with sodium intake and fluid intake [x] Compliant with medications [x] Weighs self daily 
[x] Monitors blood pressure at home *Please see recent flow sheets Newly diagnosed: 
 []  Yes  []  No    [] N/A  If yes, describe date on onset, class and medication changes: STG Status/UPOC: 
Per patient report: Identifies own individual Baselines  (see left): 
[]  Yes  []  No, continue Understands and able to act upon symptoms of worsening CHF:  
[]  Yes   []  No, continue Weighs self daily:  
[] Yes   []  No, continue Monitors BP at home:  
[] Yes   []  No, continue Compliant with medications:  
[] Yes   []  No, continue Compliant w/ diet and sodium intake:  
[] Yes   []  No, continue Monitors home fluid intake:  
[] Yes   []  No, continue Exercises daily: 
[] Yes   []  No, continue   
 
[] Other: LTG (to be achieved by D/C): 
[] Able to verbalize individual baselines, signs and symptoms of worsening barth failure and appropriate self-management  
[] Compliant with sodium intake and fluid intake 
[] Compliant with medications 
[] Weighs self daily [] Monitors blood pressure at home *Please see recent flow sheets Newly diagnosed: 
 []  Yes  []  No    [] N/A If yes, describe date on onset, class and medication changes: STG Status/UPOC: 
Per patient report: Identifies own individual Baselines  (see left): 
[]  Yes  []  No, continue Understands and able to act upon symptoms of worsening CHF:  
[]  Yes   []  No, continue Weighs self daily:  
[] Yes   []  No, continue Monitors BP at home:  
[] Yes   []  No, continue Compliant with medications:  
[] Yes   []  No, continue Compliant w/ diet and sodium intake:  
[] Yes   []  No, continue Monitors home fluid intake:  
[] Yes   []  No, continue Exercises daily: 
[] Yes   []  No, continue   
 
[] Other: LTG (to be achieved by D/C): 
[] Able to verbalize individual baselines, signs and symptoms of worsening barth failure and appropriate self-management  
[] Compliant with sodium intake and fluid intake 
[] Compliant with medications 
[] Weighs self daily [] Monitors blood pressure at home Core Measures Hypertention 90-Day Re-Assessment/POC Date/Time:7/27/18 1605 [address] 120-Day Re-Assessment/POC Date/Time: 
Initials: 150-Day Re-Assessment/POC Date/Time: 
 
Initials:  
 
[]  Not a Risk Factor at This Time. No plan of care required. *Please see recent flow sheets Newly diagnosed: 
 []  Yes  [x]  No    [] N/A If yes, describe date of onset and medication changes: STG Status/UPOC: 
Per patient report BP Medication compliance: 
[x] Yes   []  No, continue Low Na+ diet: 
[x] Yes   []  No, continue Monitors BP at home: 
[] Yes   [x]  No, continue Resting BP (obtained by clinician): 
[] Yes   [x]  No, continue Other: LTG (to be achieved by D/C): 
[x] Consistent resting blood pressure <140/90 [x] Monitors BP at home 
[x] BP medication compliance *Please see recent flow sheets Newly diagnosed: 
 []  Yes  []  No    [] N/A If yes, describe date of onset and medication changes: STG Status/UPOC: 
Per patient report BP Medication compliance: 
[] Yes   []  No, continue Low Na+ diet: 
[] Yes   []  No, continue Monitors BP at home: 
[] Yes   []  No, continue Resting BP (obtained by clinician): 
[] Yes   []  No, continue Other:  *Please see recent flow sheets Newly diagnosed: 
 []  Yes  []  No    [] N/A If yes, describe date of onset and medication changes: STG Status/UPOC: 
Per patient report BP Medication compliance: 
 [] Yes   []  No, continue Low Na+ diet: 
[] Yes   []  No, continue Monitors BP at home: 
[] Yes   []  No, continue Resting BP (obtained by clinician): 
[] Yes   []  No, continue Other: LTG (to be achieved by D/C): 
[] Consistent resting blood pressure < 
[] Monitors BP at home 
[] BP medication compliance LTG (to be achieved by D/C): 
[] Consistent resting blood pressure < 
[] Monitors BP at home 
[] BP medication compliance Core Measures Diabetes 90-Day Re-Assessment/POC Date/Time:7/27/18 1605 [address] [facility] 120-Day Re-Assessment/POC Date/Time: 
Initials: 150-Day Re-Assessment/POC Date/Time Initials:   
 
 
[x]  Not a Risk Factor at This Time. No plan of care required. *Please see recent flow sheets Newly diagnosed: 
 []  Yes  []  No    [x] N/A If yes, describe date of onset and medication changes: STG Status/UPOC: 
Per patient report Compliant with diabetic medication: 
 [] Yes   []  No, continue Compliant with diabetic diet: 
[] Yes   []  No, continue Monitors blood sugar at home: 
[] Yes   []  No, continue Takes appropriate corrective actions when blood glucose is out of range: 
[] Yes   []  No, continue Other: LTG (to be achieved by D/C): 
 
[] Blood sugar <     And >    For exercise 
[] Patient is medication compliant 
[] Patient controls blood sugar via diet 
[] Blood sugar <    And >    For fasting blood sugar *Please see recent flow sheets Newly diagnosed: 
 []  Yes  []  No    [] N/A If yes, describe date of onset and medication changes: STG Status/UPOC: 
Per patient report Compliant with diabetic medication: 
 [] Yes   []  No, continue Compliant with diabetic diet: 
[] Yes   []  No, continue Monitors blood sugar at home: 
[] Yes   []  No, continue Takes appropriate corrective actions when blood glucose is out of range: 
[] Yes   []  No, continue Other: *Please see recent flow sheets Newly diagnosed: 
 []  Yes  []  No    [] N/A If yes, describe date of onset and medication changes: STG Status/UPOC: 
Per patient report Compliant with diabetic medication: 
[] Yes   []  No, continue Compliant with diabetic diet: 
[] Yes   []  No, continue Monitors blood sugar at home: 
[] Yes   []  No, continue Takes appropriate corrective actions when blood glucose is out of range: 
[] Yes   []  No, continue Other: LTG (to be achieved by D/C): 
 
[] Blood sugar <     And >    For exercise 
[] Patient is medication compliant 
[] Patient controls blood sugar via diet 
[] Blood sugar <    And >    For fasting blood sugar LTG (to be achieved by D/C): 
 
[] Blood sugar <     And >    For exercise 
[] Patient is medication compliant 
[] Patient controls blood sugar via diet 
[] Blood sugar <    And >    For fasting blood sugar Patient Goal(s):  
Jacklyn Powers was an active participant in the development of this ITP and is agreeable to treatment within an open gym environment. Supervising Physician: Dr. Claire Newman, RT 
6/59/2558,6:00 PM 
 
Medical Director ITP and Exercise Prescription Approval 
Respiratory Therapy I 
 
90-Day Reassessment: 
Patient's functional level; progress towards goals; carry-over learning within the home; problems, concerns and/or deficits; treatment plan (treatment modalities); and patient goals were reviewed with the patient. Additional Physician findings and/or comments (if applicable). The Medical Directors electronic co-signature validates that provider has reviewed the above findings and concur with these findings with any changes and/or additional comments noted below. I certify the need for Respiratory therapy furnished under this plan of treatment and while under my care. I agree with the plan and certify that this therapy is necessary. Certification [id number] 
 
 
120- Day Reassessment: 
Patient's functional level; progress towards goals; carry-over learning within the home; problems, concerns and/or deficits; treatment plan (treatment modalities); and patient goals were reviewed with the patient. Additional Physician findings and/or comments (if applicable). The Medical Directors electronic co-signature validates that provider has reviewed the above findings and concur with these findings with any changes and/or additional comments noted below. I certify the need for Respiratory therapy furnished under this plan of treatment and while under my care. I agree with the plan and certify that this therapy is necessary. Certification Period: 
 
150-Day Reassessment: 
Patient's functional level; progress towards goals; carry-over learning within the home; problems, concerns and/or deficits; treatment plan (treatment modalities); and patient goals were reviewed with the patient. Additional Physician findings and/or comments (if applicable).  
 
The Medical Directors electronic co-signature validates that provider has reviewed the above findings and concur with these findings with any changes and/or additional comments noted below. I certify the need for Respiratory therapy furnished under this plan of treatment and while under my care. I agree with the plan and certify that this therapy is necessary. Certification Period:

## 2018-07-27 NOTE — TELEPHONE ENCOUNTER
Pulmonary Rehab called patient and left a message on her cell and the cell of her emergency contact. Additional attempts at contact will be made. Thank you, Annelise Steiner

## 2018-07-30 ENCOUNTER — HOSPITAL ENCOUNTER (OUTPATIENT)
Dept: PULMONOLOGY | Age: 63
Discharge: HOME | End: 2018-07-30
Payer: MEDICARE

## 2018-07-30 PROCEDURE — G0237 THERAPEUTIC PROCD STRG ENDUR: HCPCS

## 2018-07-30 NOTE — PROGRESS NOTES
[address] [place] [address]  Respiratory Therapy Meiheet Ramirez [facility] [place] [facility] 1955 Patient's carry-over of treatment delivered by: Patient reports that she hasn't been to Pulmonary Rehab because her air conditioning at home has been out, her oxygen concentrator was malfunctioning, and she was having issues with her insurance. Patient reports that she has been getting 6 hours of uninterrupted sleep each night. Objective Daily Findings Comments:No distress noted. Respiratory Muscle Functioning/Exercise Conditioning/Strengthening Session: 
 
Time In: 1430 Time [id number] Session Number: 20 
O2 with Therapy: 4-6 L/min Pre SPO2 93  Pre HR:103  Pre BP: 130/64 RR: 20    Wt: 183.4  Temp: 98.4 Post SPO2: 96 Post HR: 98  Post BP: 128/58 Self Care Home Management Instruction/Education Patient Self Monitored Activity Time In:1520 Time Out:1605 [] - Breathing Retraining   [] - Smoking Cessation   [] - Exercise Concepts   [] - Nutrition [] - Collaborative Self-Management  [] - Secretion Clearance and Home Management   
[] - Body Mechanics & Energy Conservation/Work Simplification (EC/WS) Techniques [] - COPD & Lung Disease   [] - Travel & Environment  [] - Relaxation Techniques [] - Medication & Side Effects   [] - Medication Adverse Interactions   [x] - Oxygen Training   [] - Metered Dose Inhaler (MDI)   [] - Stress Management   [] - Sexuality & Lung Disease 
[] - Fall Risk Precautions Patients response to Education [x] - Patient actively participated in education [] - Patient disengaged during education     [x] - Able to recite main objectives [] - Unable to recite main objectives [] - Able to demonstrate [x] - NA 
[] - Unable to demonstrate Comments: OXYGEN SAFETY AND IMPORTANCE      Individual Therapy         Goal    Actual                      During Therapy                 Post-Therapy 
   % SpO2 HR PDS 
1-10 PES 
1-10 Pain  1 - 10 % SpO2 HR PDS 
1-10 PES 
1-10 Pain 0 - 10 Treadmill Bike Stepper L6 
30 min L5 
30 min 93 101 1 3 1 96 98 1 1 1 Arm Ergometer 15 llanes 30 min 10 llanes 20 min 97 98 1 4 1 96 92 1 1 1 Rower Weight Training Therabands Weighted DB IMT 15 cm 
20 min 14 cm 
15 min 95 80 1 2 1 95 79 1 1 1 IS 2000 ml 
25 min 1000 ml 
15 min 95 91 1 3 1 95 93 1 1 1 PEP 15 cm 
20 min 14 cm 
15 min 95 84 1 2 1 95 81 1 1 1 Patient's response to therapy:    
[] - ? Dyspnea    [] - ? Fatigue    [] - ? Heart rate    [] - ? Wheeze    [] - ? Cough [] - ? Pain; location: _____________________________ [] - ? SpO2     [] - ? Pacing     [] - ? Pursed lip breathing   [] - ? Diaphragmatic breathing    
[] - Experienced no problems; no cuing required. [x] - Good effort     [] - Fair effort     [] - Poor effort    [x] - Good motivation [] - Fair motivation     [] - Poor motivation Comments  : 
 
Assessment Patient's response:  
[] Patient unable to progress towards LTGs due to:  [] - Illness     [] - Weakness / debility [] - Poor effort     [] - Poor Motivation     [] - Poor Attendance [x] Patient progressing towards LTGs: evident by:    
[] Decreased Dyspnea on exertion   [] Decreased Fatigue   [x] Increased PLB        [] Increased DB    [] Decreased pain    [] Increased Pacing     [] Reduced work of breathing, increased SpO2, and decreased PDS, PES, and PPS [] Improved ventilatory muscle (diaphragm) strength and endurance    [] Improved Aerobic capacity and endurance    
[] Cigarette reduction / Smoking cessation participation     
[] Other:         
 
 
Plan:       [x] - Continue as written   OR    [] - Adjust treatment plan as follows:  
 
 
Billing: # _6____  # _____    # _____ Other       __95___ Minutes of Individualized Treatment () 
      _____ Minutes of Group Treatment (Della Dawson) 
      _____ Minutes of Other (________________________________) Physician supervision provided this date of service by: Dr. Louann Tavarez Therapist Signature:RT Maddy Therapist PRINTED Name and Credential: RT Brett 
 
7/30/2018 
2:33 PM

## 2018-08-01 ENCOUNTER — HOSPITAL ENCOUNTER (OUTPATIENT)
Dept: PULMONOLOGY | Age: 63
Discharge: HOME | End: 2018-08-01
Payer: MEDICARE

## 2018-08-01 PROCEDURE — G0237 THERAPEUTIC PROCD STRG ENDUR: HCPCS

## 2018-08-03 ENCOUNTER — HOSPITAL ENCOUNTER (OUTPATIENT)
Dept: PULMONOLOGY | Age: 63
Discharge: HOME | End: 2018-08-03
Payer: MEDICARE

## 2018-08-03 PROCEDURE — G0237 THERAPEUTIC PROCD STRG ENDUR: HCPCS

## 2018-08-03 NOTE — PROGRESS NOTES
[address] [place] [address]  Respiratory Therapy Flowsheet Johnny Oreilly 1955 Patient's carry-over of treatment delivered by: Patient reports that she got a stationary bike at home yesterday and plans to start using it on the days she doesn't come to rehab. Objective Daily Findings Comments:No distress noted. Respiratory Muscle Functioning/Exercise Conditioning/Strengthening Session: 
 
Time In: 1430 Time [id number] Session Number: 22 
O2 with Therapy: 4-6 L/min Pre SPO2 98  Pre HR:82  Pre BP: 153/86 RR: 18    Wt: 183  Temp: 98.2 Post SPO2: 96 Post HR: 82  Post BP: 165/80 Self Care Home Management Instruction/Education Patient Self Monitored Activity Time In:1530 Time [id number] [] - Breathing Retraining   [] - Smoking Cessation   [] - Exercise Concepts   [] - Nutrition [] - Collaborative Self-Management  [] - Secretion Clearance and Home Management   
[] - Body Mechanics & Energy Conservation/Work Simplification (EC/WS) Techniques [] - COPD & Lung Disease   [] - Travel & Environment  [] - Relaxation Techniques [] - Medication & Side Effects   [] - Medication Adverse Interactions   [] - Oxygen Training   [] - Metered Dose Inhaler (MDI)   [] - Stress Management   [] - Sexuality & Lung Disease 
[] - Fall Risk Precautions Patients response to Education [] - Patient actively participated in education [] - Patient disengaged during education     [] - Able to recite main objectives [] - Unable to recite main objectives [] - Able to demonstrate   
[] - NA 
[] - Unable to demonstrate Comments: Individual Therapy         Goal    Actual                      During Therapy                 Post-Therapy 
   % SpO2 HR PDS 
1-10 PES 
1-10 Pain  1 - 10 % SpO2 HR PDS 
1-10 PES 
1-10 Pain 0 - 10 Treadmill Bike Stepper L6 
30 min L5 
30 min 99 90 1 2 1 98 87 1 1 1 Arm Ergometer 15 llanes 30 min 12 llanes 30 min  97 86 1 2 1 96 86 1 1 1 Rower Weight Training Therabands Weighted DB IMT 16 cm 
20 min 15 cm 15 min 96 82 1 2 1 96 80 1 1 1 IS 2000 ml 
25 min 1000 ml 
15 min 97 90 1 3 1 96 89 1 1 1 PEP 16 cm 
20 min 15 cm 15 min 99 98 1 2 1 99 97 1 1 1 Patient's response to therapy:    
[] - ? Dyspnea    [] - ? Fatigue    [] - ? Heart rate    [] - ? Wheeze    [] - ? Cough [] - ? Pain; location: _____________________________ [] - ? SpO2     [] - ? Pacing     [] - ? Pursed lip breathing   [] - ? Diaphragmatic breathing    
[] - Experienced no problems; no cuing required. [x] - Good effort     [] - Fair effort     [] - Poor effort    [x] - Good motivation [] - Fair motivation     [] - Poor motivation Comments  : 
 
Assessment Patient's response:  
[] Patient unable to progress towards LTGs due to:  [] - Illness     [] - Weakness / debility [] - Poor effort     [] - Poor Motivation     [] - Poor Attendance [x] Patient progressing towards LTGs: evident by:    
[x] Decreased Dyspnea on exertion   [] Decreased Fatigue   [] Increased PLB        [] Increased DB    [] Decreased pain    [] Increased Pacing     [] Reduced work of breathing, increased SpO2, and decreased PDS, PES, and PPS [] Improved ventilatory muscle (diaphragm) strength and endurance    [] Improved Aerobic capacity and endurance    
[] Cigarette reduction / Smoking cessation participation     
[] Other:         
 
 
Plan:       [x] - Continue as written   OR    [] - Adjust treatment plan as follows:  
 
 
Billing: # __7___  # _____  # _____ Other       __105___ Minutes of Individualized Treatment () 
      _____ Minutes of Group Treatment (Abril Menard) 
      _____ Minutes of Other (________________________________) Physician supervision provided this date of service by: Dr. Marguerite Javed Therapist Signature:RT Cayden Therapist PRINTED Name and Credential: Gautam Loja, RT 
 
8/3/2018 
2:26 PM

## 2018-08-06 ENCOUNTER — HOSPITAL ENCOUNTER (OUTPATIENT)
Dept: PULMONOLOGY | Age: 63
Discharge: HOME | End: 2018-08-06
Payer: MEDICARE

## 2018-08-06 PROCEDURE — G0237 THERAPEUTIC PROCD STRG ENDUR: HCPCS

## 2018-08-06 NOTE — PROGRESS NOTES
[address] [place] [address]   Respiratory Therapy Flowsheet      Yogesh Griffin  1955       Patient's carry-over of treatment delivered by: Patient reports that she has not started using her stationary bike at home yet. Objective Daily Findings    Comments:No distress noted. Respiratory Muscle Functioning/Exercise Conditioning/Strengthening Session:    Time In: 1672  Time Out::1625  Session Number: 23  O2 with Therapy: 4-6 L/min    Pre SPO2 93  Pre HR:108  Pre BP: 141/79    RR: 18    Wt: 183.2  Temp: 98.4   Post SPO2: 95 Post HR: 84  Post BP: 128/72    Self Care Home Management Instruction/Education    Patient Self Monitored Activity Time In:1350 Time Out:1425     [x] - Breathing Retraining   [] - Smoking Cessation   [] - Exercise Concepts   [] - Nutrition    [] - Collaborative Self-Management  [] - Secretion Clearance and Home Management    [] - Body Mechanics & Energy Conservation/Work Simplification (EC/WS) Techniques    [] - COPD & Lung Disease   [] - Travel & Environment  [] - Relaxation Techniques   [] - Medication & Side Effects   [] - Medication Adverse Interactions   [] - Oxygen Training   [] - Metered Dose Inhaler (MDI)   [] - Stress Management   [] - Sexuality & Lung Disease  [] - Fall Risk Precautions      Patients response to Education [x] - Patient actively participated in education  [] - Patient disengaged during education     [x] - Able to recite main objectives  [] - Unable to recite main objectives [x] - Able to demonstrate    [] - NA  [] - Unable to demonstrate       Comments:                Individual Therapy         Goal    Actual                      During Therapy                 Post-Therapy     % SpO2 HR PDS  1-10 PES  1-10 Pain  1 - 10 % SpO2 HR PDS  1-10 PES  1-10 Pain 0 - 10   Treadmill                 Bike               Stepper L6  30 min L5  30 min 94 103 1 2 1 94 99 1 1 1   Arm Ergometer 20 llanes  30 min 15 llanes  30 min 93 100 1 2 1 94 94 99 1 1   Liberty Weight Training               Therabands               Weighted DB               IMT 16 cm  20 min 15 cm  10 min 96 107 1 2 1 96 103 1 1 1   IS 1500 ml  25 min 1000 ml  15 min 97 91 1 2 1 96 90 1 1 1   PEP 16 cm  20 min 15 cm  10 min 99 95 1 2 1 99 93 1 1 1     Patient's response to therapy:     [] - ? Dyspnea    [] - ? Fatigue    [] - ? Heart rate    [] - ? Wheeze    [] - ? Cough     [] - ? Pain; location: _____________________________  [] - ? SpO2     [] - ? Pacing     [] - ? Pursed lip breathing   [] - ? Diaphragmatic breathing     [] - Experienced no problems; no cuing required.   [x] - Good effort     [] - Fair effort     [] - Poor effort    [x] - Good motivation      [] - Fair motivation     [] - Poor motivation    Comments  :    Assessment  Patient's response:   [] Patient unable to progress towards LTGs due to:  [] - Illness     [] - Weakness / debility [] - Poor effort     [] - Poor Motivation     [] - Poor Attendance        [x] Patient progressing towards LTGs: evident by:     [] Decreased Dyspnea on exertion   [] Decreased Fatigue   [x] Increased PLB        [] Increased DB    [] Decreased pain    [] Increased Pacing     [] Reduced work of breathing, increased SpO2, and decreased PDS, PES, and PPS  [] Improved ventilatory muscle (diaphragm) strength and endurance    [] Improved Aerobic capacity and endurance     [] Cigarette reduction / Smoking cessation participation      [] Other:              Plan:       [x] - Continue as written   OR    [] - Adjust treatment plan as follows:       Billing:    # __6___     # _____     # _____ Other       ___95__ Minutes of Individualized Treatment ()        _____ Minutes of Group Treatment (Maxwell Freeze)        _____ Minutes of Other (________________________________)             Physician supervision provided this date of service by: Dr. Prashant Leong       Therapist Signature:Madiha Mendoza RT    Therapist PRINTED Name and Credential: RT Coleen    8/6/2018  2:48 PM

## 2018-08-08 ENCOUNTER — HOSPITAL ENCOUNTER (OUTPATIENT)
Dept: PULMONOLOGY | Age: 63
Discharge: HOME | End: 2018-08-08
Payer: MEDICARE

## 2018-08-08 ENCOUNTER — TELEPHONE (OUTPATIENT)
Dept: PULMONOLOGY | Age: 63
End: 2018-08-08

## 2018-08-08 PROCEDURE — G0237 THERAPEUTIC PROCD STRG ENDUR: HCPCS

## 2018-08-08 NOTE — TELEPHONE ENCOUNTER
Pulmonary Rehab called Medicaid and spoke to ANGELICA Nur  who confirmed patient's enrollment and active coverage until 12/31/18. Ernie Giordano confirmed that patient's coverage will  whatever Medicare Part B does not.     Thank you,  Shital Gross

## 2018-08-08 NOTE — PROGRESS NOTES
[address] [place] [address]   Respiratory Therapy Flowsheet      Rizwana [facility]  1955       Patient's carry-over of treatment delivered by: none noted today. Objective Daily Findings    Comments:    Respiratory Muscle Functioning/Exercise Conditioning/Strengthening Session:    Time In: 12:30  Time Out::2:25  Session Number: 24  O2 with Therapy: 6 L/min    Pre SPO2 95  Pre HR:93  Pre BP: 141/69    RR: 18    Wt: 183   Temp: 98.4   Post SPO2: 95 Post HR: 85  Post BP: 124/67    Self Care Home Management Instruction/Education    Patient Self Monitored Activity Time In:NA Time Out:NA     [x] - Breathing Retraining   [] - Smoking Cessation   [] - Exercise Concepts   [] - Nutrition    [] - Collaborative Self-Management  [] - Secretion Clearance and Home Management    [] - Body Mechanics & Energy Conservation/Work Simplification (EC/WS) Techniques    [] - COPD & Lung Disease   [] - Travel & Environment  [] - Relaxation Techniques   [] - Medication & Side Effects   [] - Medication Adverse Interactions   [] - Oxygen Training   [] - Metered Dose Inhaler (MDI)   [] - Stress Management   [] - Sexuality & Lung Disease  [] - Fall Risk Precautions      Patients response to Education [x] - Patient actively participated in education  [] - Patient disengaged during education     [] - Able to recite main objectives  [] - Unable to recite main objectives [] - Able to demonstrate    [] - NA  [] - Unable to demonstrate       Comments:                Individual Therapy         Goal    Actual                      During Therapy                 Post-Therapy     % SpO2 HR PDS  1-10 PES  1-10 Pain  1 - 10 % SpO2 HR PDS  1-10 PES  1-10 Pain 0 - 10   Treadmill                 Bike               Stepper 30 min x L6 30 min x L6 90 97 1 3 1 92 108 1 3 1   Arm Ergometer 30 min x 20 llanes 30 min x 20 llanes 92 96 1 2 1 93 97 1 3 1   Rower               Weight Training               Therabands               Weighted DB               IMT/PEP 30 min x 15 cm 30 min x 15 cm 96 98 1 2 1 97 92 1 3 1   IS 25 min x 1500 25 min x 1250 96 83 1 2 1 97 80 1 2 1                    Patient's response to therapy:     [] - ? Dyspnea    [] - ? Fatigue    [] - ? Heart rate    [] - ? Wheeze    [] - ? Cough     [] - ? Pain; location: _____________________________  [] - ? SpO2     [] - ? Pacing     [] - ? Pursed lip breathing   [] - ? Diaphragmatic breathing     [] - Experienced no problems; no cuing required. [x] - Good effort     [] - Fair effort     [] - Poor effort    [x] - Good motivation      [] - Fair motivation     [] - Poor motivation    Comments  :    Assessment  Patient's response:   [] Patient unable to progress towards LTGs due to:  [] - Illness     [] - Weakness / debility [] - Poor effort     [] - Poor Motivation     [] - Poor Attendance        [] Patient progressing towards LTGs: evident by:     [] Decreased Dyspnea on exertion   [] Decreased Fatigue   [x] Increased PLB        [] Increased DB    [] Decreased pain    [] Increased Pacing     [] Reduced work of breathing, increased SpO2, and decreased PDS, PES, and PPS  [] Improved ventilatory muscle (diaphragm) strength and endurance    [] Improved Aerobic capacity and endurance     [] Cigarette reduction / Smoking cessation participation      [] Other:              Plan:       [x] - Continue as written   OR    [] - Adjust treatment plan as follows:       Billing:    # ___8__     # _____ Sherly    # _____ Other       _115____ Minutes of Individualized Treatment ()        _____ Minutes of Group Treatment (Sherly)        _____ Minutes of Other (________________________________)             Physician supervision provided this date of service by: Dr. Wanda Leon       Therapist Signature: Isabel Kansas, OTR/L    Therapist PRINTED Name and Credential: Isabel Kansas, OTR/L    8/8/2018  2:36 PM

## 2018-08-10 ENCOUNTER — HOSPITAL ENCOUNTER (OUTPATIENT)
Dept: PULMONOLOGY | Age: 63
Discharge: HOME | End: 2018-08-10
Payer: MEDICARE

## 2018-08-10 PROCEDURE — G0237 THERAPEUTIC PROCD STRG ENDUR: HCPCS

## 2018-08-13 ENCOUNTER — HOSPITAL ENCOUNTER (OUTPATIENT)
Dept: PULMONOLOGY | Age: 63
Discharge: HOME | End: 2018-08-13
Payer: MEDICARE

## 2018-08-13 PROCEDURE — G0237 THERAPEUTIC PROCD STRG ENDUR: HCPCS

## 2018-08-13 NOTE — PROGRESS NOTES
[address] [place] [address]   Respiratory Therapy Flowsheet      Gissel Jolley  1955       Patient's carry-over of treatment delivered by: Patient reports that she was able to attend a [employer] event and picnic without becoming short of breath or fatigued. Objective Daily Findings    Comments:no distress noted. Respiratory Muscle Functioning/Exercise Conditioning/Strengthening Session:    Time In: 1450  Time Out::1625  Session Number: 26  O2 with Therapy: 6 L/min    Pre SPO2 96  Pre HR:94  Pre BP: 136/82    RR: 20    Wt: 182  Temp: 98.4   Post SPO2: 95 Post HR: 79  Post BP: 124/79    Self Care Home Management Instruction/Education    Patient Self Monitored Activity Time In:1545 Time Out:1625     [] - Breathing Retraining   [] - Smoking Cessation   [] - Exercise Concepts   [] - Nutrition    [] - Collaborative Self-Management  [] - Secretion Clearance and Home Management    [] - Body Mechanics & Energy Conservation/Work Simplification (EC/WS) Techniques    [] - COPD & Lung Disease   [] - Travel & Environment  [x] - Relaxation Techniques   [] - Medication & Side Effects   [] - Medication Adverse Interactions   [] - Oxygen Training   [] - Metered Dose Inhaler (MDI)   [] - Stress Management   [] - Sexuality & Lung Disease  [] - Fall Risk Precautions      Patients response to Education [x] - Patient actively participated in education  [] - Patient disengaged during education     [x] - Able to recite main objectives  [] - Unable to recite main objectives [] - Able to demonstrate    [x] - NA  [] - Unable to demonstrate       Comments:                Individual Therapy         Goal    Actual                      During Therapy                 Post-Therapy     % SpO2 HR PDS  1-10 PES  1-10 Pain  1 - 10 % SpO2 HR PDS  1-10 PES  1-10 Pain 0 - 10   Treadmill                 Bike               Stepper L6  30 min L5  30 min 96 89 1 2 1 96 87 1 1 1   Arm Ergometer 20 llanes  30 min 15 llanes  25 min 96 93 1 4 1 95 92 1 1 1 Rower               Weight Training               Therabands               Weighted DB               IMT 15 cm  20 min 14 cm  10 min 95 83 1 2 1 95 84 1 1 1   IS 1500 ml  25 min 1750 ml  15 min 97 85 1 2 1 97 86 1 1 1   PEP 15 cm  20 min 14 cm  15 min 96 87 1 2 1 96 84 1 1 1     Patient's response to therapy:     [] - ? Dyspnea    [] - ? Fatigue    [] - ? Heart rate    [] - ? Wheeze    [] - ? Cough     [] - ? Pain; location: _____________________________  [] - ? SpO2     [] - ? Pacing     [] - ? Pursed lip breathing   [] - ? Diaphragmatic breathing     [] - Experienced no problems; no cuing required.   [x] - Good effort     [] - Fair effort     [] - Poor effort    [x] - Good motivation      [] - Fair motivation     [] - Poor motivation    Comments  :    Assessment  Patient's response:   [] Patient unable to progress towards LTGs due to:  [] - Illness     [] - Weakness / debility [] - Poor effort     [] - Poor Motivation     [] - Poor Attendance        [x] Patient progressing towards LTGs: evident by:     [x] Decreased Dyspnea on exertion   [] Decreased Fatigue   [] Increased PLB        [] Increased DB    [] Decreased pain    [] Increased Pacing     [] Reduced work of breathing, increased SpO2, and decreased PDS, PES, and PPS  [] Improved ventilatory muscle (diaphragm) strength and endurance    [] Improved Aerobic capacity and endurance     [] Cigarette reduction / Smoking cessation participation      [] Other:              Plan:       [x] - Continue as written   OR    [] - Adjust treatment plan as follows:       Billing:    # _6____     # _____     # _____ Other       _95____ Minutes of Individualized Treatment ()        _____ Minutes of Group Treatment (Michael Olivas)        _____ Minutes of Other (________________________________)             Physician supervision provided this date of service by: Dr. Lana Isidro       Therapist Signature:Madiha Gutierrez RT    Therapist PRINTED Name and Credential: Chanelle Jarvis RT    8/13/2018  2:50 PM

## 2018-08-15 ENCOUNTER — HOSPITAL ENCOUNTER (OUTPATIENT)
Dept: PULMONOLOGY | Age: 63
Discharge: HOME | End: 2018-08-15
Payer: MEDICARE

## 2018-08-15 VITALS — BODY MASS INDEX: 30.45 KG/M2 | WEIGHT: 183 LBS

## 2018-08-15 PROCEDURE — G0237 THERAPEUTIC PROCD STRG ENDUR: HCPCS

## 2018-08-15 NOTE — PROGRESS NOTES
Jessica Belcher     Visit # 26/30         Date: 8/15/18         Time: 3849         Modality    SpO2 HR RPE RPD   [x]   Treadmill     Speed:  1.0 Incline:  0 # Mins:  15 97 112 4 2   [x]  Arm Ergometer      Grande:  10  # Mins:  20 96 96 2 1   [x]  NuStep     Workload  5  # Mins:  30 93 97 2 1   []  Recumbent Bike     Level: RPM: # Mins:       []  Airdyne Bike    RPM: # Mins:       []  Rower       # Mins:       []  Free Weights     LBS: Reps: #Mins:       Patient Self-Monitored Activity      Start: Stop: #Mins:       [x] Incentive Spirometer   Goal ml:  1500 Actual ml:  0609 #Mins:  20 97 76 2 1   [] Weighted Diaphragmatic Breathing Goal lbs: Actual lbs: #Mins:       [x] IMT/PEP     Goal:  15 cm Actual:  14 cm #Mins:  25 99 77 2 1               Total Minutes: [address] [facility] [place], [address], RT Ignacia Webster, RT   Ignacia Webster, RT Ignacia Webster, RT

## 2018-08-17 ENCOUNTER — HOSPITAL ENCOUNTER (OUTPATIENT)
Dept: PULMONOLOGY | Age: 63
Discharge: HOME | End: 2018-08-17
Payer: MEDICARE

## 2018-08-17 VITALS — WEIGHT: 183.7 LBS | BODY MASS INDEX: 30.57 KG/M2

## 2018-08-17 PROCEDURE — G0237 THERAPEUTIC PROCD STRG ENDUR: HCPCS

## 2018-08-17 NOTE — PROGRESS NOTES
Jolene Landon     Visit # 27/30         Date: 8/17/18         Time: 0031-0709         Modality    SpO2 HR RPE RPD   [x]   Treadmill     Speed:  1.0 Incline:  0 # Mins:  30 94 96 2 0   [x]  Arm Ergometer      Grande:  15  # Mins:  30 96 92 2 0   []  NuStep     Workload  # Mins:       []  Recumbent Bike     Level: RPM: # Mins:       []  Airdyne Bike    RPM: # Mins:       []  Rower       # Mins:       []  Free Weights     LBS: Reps: #Mins:       Patient Self-Monitored Activity      Start: Stop: #Mins:       [x] Incentive Spirometer   Goal ml:  1500 Actual ml:  1500 #Mins:  15 97 78 2 1   [] Weighted Diaphragmatic Breathing Goal lbs: Actual lbs: #Mins:       [x] IMT/PEP     Goal:  15 cm Actual:  14 cm #Mins:  15 98 76 2 1               Total Minutes Individualized Treatment: Andreina Sweeney, RT Donnalee Garcia, RT Donnalee Garcia, RT   Donnalee Garcia, RT Donnalee Garcia, RT

## 2018-08-20 ENCOUNTER — HOSPITAL ENCOUNTER (OUTPATIENT)
Dept: PULMONOLOGY | Age: 63
Discharge: HOME | End: 2018-08-20
Payer: MEDICARE

## 2018-08-20 VITALS — WEIGHT: 182 LBS | BODY MASS INDEX: 30.29 KG/M2

## 2018-08-20 PROCEDURE — G0237 THERAPEUTIC PROCD STRG ENDUR: HCPCS

## 2018-08-20 NOTE — PROGRESS NOTES
Cheryl Mesa     Visit # 28/30         Date: 8/20/18         Time: 7204-2863         Modality    SpO2 HR RPE RPD   [x]   Treadmill     Speed:  1.0 mph Incline:  0 # Mins:  30 94 109 3 1   [x]  Arm Ergometer      Grande:  15  # Mins:  53 40 23 3 1   []  NuStep     Workload  # Mins:       []  Recumbent Bike     Level: RPM: # Mins:       []  Airdyne Bike    RPM: # Mins:       []  Rower       # Mins:       []  Free Weights     LBS: Reps: #Mins:       Patient Self-Monitored Activity      Start: Stop: #Mins:       [x] Incentive Spirometer   Goal ml:  1500 Actual ml:  1500 #Mins:25 94 81 2 1   [] Weighted Diaphragmatic Breathing Goal lbs: Actual lbs: #Mins:       [x] IMT/PEP     Goal:  15 cm Actual:  14 cm #Mins:  20 96 80 2 581 [facility] [email or web address] [address] Idell Spikes, RT Daphane Sprinkle, RT Daphane Sprinkle, RT   Daphane Sprinkle, RT Daphane Sprinkle, RT

## 2018-08-22 ENCOUNTER — HOSPITAL ENCOUNTER (OUTPATIENT)
Dept: PULMONOLOGY | Age: 63
Discharge: HOME | End: 2018-08-22
Payer: MEDICARE

## 2018-08-22 VITALS — BODY MASS INDEX: 30.45 KG/M2 | WEIGHT: 183 LBS

## 2018-08-22 PROCEDURE — G0237 THERAPEUTIC PROCD STRG ENDUR: HCPCS

## 2018-08-22 NOTE — PROGRESS NOTES
Irma Chambers     Visit # 29/30         Date: 8/22/18         Time: 9676-4934         Modality    SpO2 HR RPE RPD   []   Treadmill     Speed: Incline: # Mins:       [x]  Arm Ergometer      Grande:  15  # Mins:  30 95 89 2 2   [x]  NuStep     Workload  5  # Mins:  30 99 92 2 1   []  Recumbent Bike     Level: RPM: # Mins:       []  Airdyne Bike    RPM: # Mins:       []  Rower       # Mins:       []  Free Weights     LBS: Reps: #Mins:       Patient Self-Monitored Activity      Start: Stop: #Mins:       [x] Incentive Spirometer   Goal ml:  1500 Actual ml:  1000 #Mins:  25 98 81 2 1   [] Weighted Diaphragmatic Breathing Goal lbs: Actual lbs: #Mins:       [x] IMT/PEP     Goal:  15 cmH2O Actual:  15 cmH2O #Mins:  30 99 75 2 581 [facility], RT Mata Gilman, RT Mata Gilman, RT   Mata Gilman, RT Mata Gilman, RT

## 2018-08-27 ENCOUNTER — APPOINTMENT (OUTPATIENT)
Dept: PULMONOLOGY | Age: 63
End: 2018-08-27
Payer: MEDICARE

## 2018-08-28 ENCOUNTER — HOSPITAL ENCOUNTER (OUTPATIENT)
Dept: PULMONOLOGY | Age: 63
Discharge: HOME | End: 2018-08-28
Payer: MEDICARE

## 2018-08-28 VITALS — BODY MASS INDEX: 30.54 KG/M2 | WEIGHT: 183.5 LBS

## 2018-08-28 PROCEDURE — G0237 THERAPEUTIC PROCD STRG ENDUR: HCPCS

## 2018-08-28 NOTE — PROGRESS NOTES
Nick Ruby Visit # 30/30 Date: 8/28/18 Time: 1430 Modality    SpO2 HR RPE RPD []   Treadmill Speed: Incline: # Mins:      
[x]  Arm Ergometer [place]: 
10  # Mins: 
83 96 72 4 1 []  NuStep Workload  # Mins:      
[]  Recumbent Bike Level: RPM: # Mins:      
[]  Airdyne Bike RPM: # Mins:      
[]  Rower # Mins:      
[]  Free Weights LBS: Reps: #Mins:      
Patient Self-Monitored Activity Start: Stop: #Mins:      
[] Incentive Spirometer Goal ml: 
 Actual ml: #Mins:      
[] Weighted Diaphragmatic Breathing Goal lbs: Actual lbs: #Mins:      
[x] IMT/PEP Goal: 
15 cmH2O Actual: 
15 cmH2O #Mins: 
20 97 81 2 1  
6 Minute Walk Test   6 89 104 4 1  Leon Hair, RT [place] Hair, RT Leon Hair, RT   Leon Hair, RT [place] Hair, RT

## 2018-08-29 ENCOUNTER — APPOINTMENT (OUTPATIENT)
Dept: PULMONOLOGY | Age: 63
End: 2018-08-29
Payer: MEDICARE

## 2018-08-31 ENCOUNTER — APPOINTMENT (OUTPATIENT)
Dept: PULMONOLOGY | Age: 63
End: 2018-08-31
Payer: MEDICARE

## 2018-08-31 RX ORDER — SILDENAFIL CITRATE 20 MG/1
TABLET ORAL
Qty: 90 TAB | Refills: 0 | Status: SHIPPED | OUTPATIENT
Start: 2018-08-31

## 2018-10-10 ENCOUNTER — HOSPITAL ENCOUNTER (OUTPATIENT)
Dept: PHYSICAL THERAPY | Age: 63
Discharge: HOME | End: 2018-10-10
Payer: MEDICARE

## 2018-10-10 PROCEDURE — 97161 PT EVAL LOW COMPLEX 20 MIN: CPT | Performed by: PHYSICAL THERAPIST

## 2018-10-10 PROCEDURE — 97110 THERAPEUTIC EXERCISES: CPT | Performed by: PHYSICAL THERAPIST

## 2018-10-10 PROCEDURE — G8984 CARRY CURRENT STATUS: HCPCS | Performed by: PHYSICAL THERAPIST

## 2018-10-10 PROCEDURE — 97530 THERAPEUTIC ACTIVITIES: CPT | Performed by: PHYSICAL THERAPIST

## 2018-10-10 PROCEDURE — G8985 CARRY GOAL STATUS: HCPCS | Performed by: PHYSICAL THERAPIST

## 2018-10-10 NOTE — PROGRESS NOTES
PT DAILY TREATMENT NOTE 10-18 Patient Name: Jose Garcia Date:10/10/2018 : 1955 [x]  Patient  Verified Payor: Patricia Youssef / [place] [id number] / Product Type: Managed Care Medicare / In time:1104  Out time:1142 Total Treatment Time (min): 38 Visit #: 1 of 8 Medicare/BCBS Only Total Timed Codes (min):  28 1:1 Treatment Time:  45 Treatment Area: Pain in neck [M54.2] SUBJECTIVE Pain Level (0-10 scale): 1-2 Any medication changes, allergies to medications, adverse drug reactions, diagnosis change, or new procedure performed?: [x] No    [] Yes (see summary sheet for update) Subjective functional status/changes:   [] No changes reported See eval. 
 
OBJECTIVE 10 min [x]Eval                  []Re-Eval  
 
 
20 min Therapeutic Exercise:  [] See flow sheet :  
Rationale: increase ROM, increase strength, improve coordination and increase proprioception to improve the patients ability to tolerate daily activities with improved mobility/stability and reduced pain. 8 min Manual Therapy:  Mulligan SNAG rotation R/L x 8 reps each with overpressure Rationale: decrease pain, increase ROM, increase tissue extensibility and increase postural awareness to tolerate daily tasks with improved mobility/stability and reduced pain. With 
 [] TE 
 [] TA 
 [] neuro 
 [] other: Patient Education: [x] Review HEP [] Progressed/Changed HEP based on:  
[] positioning   [] body mechanics   [] transfers   [] heat/ice application   
[] other:   
 
Other Objective/Functional Measures: See eval.  
 
Pain Level (0-10 scale) post treatment: 0 
 
ASSESSMENT/Changes in Function:  
[x]  See Plan of Care Progress towards goals / Updated goals: 
Short Term Goals: To be accomplished in 2 weeks: 1. Pt to report [place] with HEP. Eval status: HEP issued and reviewed physically/verbally with pt Long Term Goals: To be accomplished in 4 weeks: 1.  Pt to report score of 68 on FOTO, indicating improved tolerance to activity and reduced pain. Eval status: 59 on initial FOTO 2. Pt to be able to tolerate crocheting with no increased pain to be able to accomplish hobby related tasks. Eval status: pt limited by stiffness and pain in her neck 3. Pt to demonstrate full cervical ROM in all planes with no increased c/o pain to improve mobility and reduce fall risk. Eval status: pt with 25% limited flexion, 25% limited extension, 50% limited R/L lateral flexion, 25% limited rotation R/L with pain/stiffness PLAN 
[]  Upgrade activities as tolerated     [x]  Continue plan of care 
[]  Update interventions per flow sheet      
[]  Discharge due to:_ 
[]  Other:_   
 
Juan Welch PT 10/10/2018  12:26 PM 
 
Future Appointments Date Time Provider Anshul Burgos 10/17/2018 2:30 PM [facility] [address]  
10/19/2018 2:30 PM [facility] [address]  
10/24/2018 2:30 PM [facility] [address]  
10/26/2018 2:30 PM [facility] [address]  
10/31/2018 2:30 PM Glynn Harper [facility] [address]  
11/7/2018 10:20 AM Rancho Dawkins MD [address] [place] [address]

## 2018-10-10 NOTE — PROGRESS NOTES
[facility]  [facility]  [address], [address] 
[phone] fax Plan of Care/ Statement of Necessity for Physical Therapy Services Patient name: Asher Mccracken Start of Care: 10/10/2018 Referral source: Hailey Deshpande MD : 1955 Medical Diagnosis: Pain in neck [M54.2] Onset Date:3-4 months ago Treatment Diagnosis: cervical pain Prior Hospitalization: see medical history Provider#: [id number] Medications: Verified on Patient summary List  
 Comorbidities: CTS in R hand, CT symptoms in L hand, COPD, 6L O2 continuous oxygen via nasal canula, HTN, visual impairment, asthma, weight change of 10 lbs recently Prior Level of Function: Pt reports full Independent PLOF. She has been using a 4WW for several weeks to assist in carrying her oxygen and crocheting supplies, not for balance. Pt has an 5 y/o grandson at home who occasionally helps her. The Plan of Care and following information is based on the information from the initial evaluation. Assessment/ key information: Pt is a 69 y/o female who presents today with 3-4 month history of neck pain and stiffness. Pt reports that there was no specific injury, but notes waking up with a \"crick\" in her neck for the past few months. She sleeps with a regular pillow and an airplane pillow under her neck for support. She notes no pain or discomfort at rest, but notes pain/stiffness with rotation R/L. She notes vision deficits recently, but denies diplopia, dizziness, slurred speech, clumsiness, and numbness and tingling in the UE (other than what she attributes to carpal tunnel bilaterally). Pt has slight reduced strength with L abduction shoulder MMT, but otherwise has full 5/5 strength with B UE. She has reduced cervical ROM in all planes, worse with rotation R/L and lateral flexion R/L.   Pt with negative cervical compression testing and Spurling's tests. Pt would benefit from skilled PT intervention to address the above limitations and return her to PLOF. Evaluation Complexity History HIGH Complexity :3+ comorbidities / personal factors will impact the outcome/ POC ; Examination HIGH Complexity : 4+ Standardized tests and measures addressing body structure, function, activity limitation and / or participation in recreation  ;Presentation LOW Complexity : Stable, uncomplicated  ;Clinical Decision Making MEDIUM Complexity : FOTO score of 26-74 Overall Complexity Rating: LOW Problem List: pain affecting function, decrease ROM, decrease strength, decrease ADL/ functional abilitiies, decrease activity tolerance, decrease flexibility/ joint mobility and decrease transfer abilities Treatment Plan may include any combination of the following: Therapeutic exercise, Therapeutic activities, Neuromuscular re-education, Physical agent/modality, Manual therapy, Patient education, Self Care training, Functional mobility training and Home safety training Patient / Family readiness to learn indicated by: asking questions, trying to perform skills and interest 
Persons(s) to be included in education: patient (P) Barriers to Learning/Limitations: None Patient Goal (s): make it go away Patient Self Reported Health Status: fair Rehabilitation Potential: good Short Term Goals: To be accomplished in 2 weeks: 1. Pt to report [place] with HEP. Eval status: HEP issued and reviewed physically/verbally with pt Long Term Goals: To be accomplished in 4 weeks: 1. Pt to report score of 68 on FOTO, indicating improved tolerance to activity and reduced pain. Eval status: 59 on initial FOTO 2. Pt to be able to tolerate crocheting with no increased pain to be able to accomplish hobby related tasks. Eval status: pt limited by stiffness and pain in her neck 3.   Pt to demonstrate full cervical ROM in all planes with no increased c/o pain to improve mobility and reduce fall risk. Eval status: pt with 25% limited flexion, 25% limited extension, 50% limited R/L lateral flexion, 25% limited rotation R/L with pain/stiffness Frequency / Duration: Patient to be seen 2 times per week for 4 weeks. Patient/ Caregiver education and instruction: Diagnosis, prognosis, self care, activity modification and exercises 
 [x]  Plan of care has been reviewed with PTA 
 
G-Codes (GP) Carry  Current  CK= 40-59%  Goal  CJ= 20-39% The severity rating is based on clinical judgment and the FOTO score. Certification Period: 10/10/18-11/9/18 Noa Casillas, [place] 10/10/2018 12:31 PM 
 
________________________________________________________________________ I certify that the above Therapy Services are being furnished while the patient is under my care. I agree with the treatment plan and certify that this therapy is necessary. [de-identified] Signature:____________Date:_________TIME:________ 
 
[employer], Date and Time must be completed for valid certification ** Please sign and return to [facility]  [address] [facility], [address] [phone] fax

## 2018-10-24 ENCOUNTER — APPOINTMENT (OUTPATIENT)
Dept: PHYSICAL THERAPY | Age: 63
End: 2018-10-24
Payer: MEDICARE

## 2018-10-25 NOTE — PROGRESS NOTES
[facility]  [address]  [address] [facility], [address] [phone] fax Discharge Summary Patient name: Caio Jeff Start of Care: 10/10/2018 Referral source: Yohana Fuller MD : 1955 Medical Diagnosis: Pain in neck [M54.2] 
  Onset Date:3-4 months ago Treatment Diagnosis: cervical pain Prior Hospitalization: see medical history Provider#: [id number] Medications: Verified on Patient summary List  
 Comorbidities: CTS in R hand, CT symptoms in L hand, COPD, 6L O2 continuous oxygen via nasal canula, HTN, visual impairment, asthma, weight change of 10 lbs recently Prior Level of Function: Pt reports full Independent PLOF. She has been using a 4WW for several weeks to assist in carrying her oxygen and crocheting supplies, not for balance. Pt has an 7 y/o grandson at home who occasionally helps her. Visits from Start of Care: 1    Missed Visits: 3 Reporting Period : 10/10/18 to 10/10/18 Short Term Goals: To be accomplished in 2 weeks: 1. Pt to report [place] with HEP. Eval status: HEP issued and reviewed physically/verbally with pt 
 NOT MET Long Term Goals: To be accomplished in 4 weeks: 1. Pt to report score of 68 on FOTO, indicating improved tolerance to activity and reduced pain. Eval status: 59 on initial FOTO 
  NOT MET 2. Pt to be able to tolerate crocheting with no increased pain to be able to accomplish hobby related tasks. Eval status: pt limited by stiffness and pain in her neck NOT MET 3. Pt to demonstrate full cervical ROM in all planes with no increased c/o pain to improve mobility and reduce fall risk. Eval status: pt with 25% limited flexion, 25% limited extension, 50% limited R/L lateral flexion, 25% limited rotation R/L with pain/stiffness NOT MET 
 
 Assessment/Summary of care: The patient was scheduled for 4 visits. They attended 1 of them and was last seen on 10/10/18 with 3 consecutive missed visits. Due to the inability to further their care from non-attendance, we are discharging the patient from physical therapy at this time. We appreciate the kind referral and would willingly work with this patient again, should they be able to arrange regular attendance. Your patient's health is our primary concern. RECOMMENDATIONS: 
[x]Discontinue therapy: []Patient has reached or is progressing toward set goals [x]Patient is non-compliant 
    []Due to lack of appreciable progress towards set goals Glynn Flores 10/25/2018 10:51 AM

## 2018-10-26 ENCOUNTER — APPOINTMENT (OUTPATIENT)
Dept: PHYSICAL THERAPY | Age: 63
End: 2018-10-26
Payer: MEDICARE

## 2018-10-31 ENCOUNTER — APPOINTMENT (OUTPATIENT)
Dept: PHYSICAL THERAPY | Age: 63
End: 2018-10-31
Payer: MEDICARE

## 2018-11-07 ENCOUNTER — OFFICE VISIT (OUTPATIENT)
Dept: CARDIOLOGY CLINIC | Age: 63
End: 2018-11-07

## 2018-11-07 VITALS
HEART RATE: 77 BPM | DIASTOLIC BLOOD PRESSURE: 80 MMHG | HEIGHT: 65 IN | OXYGEN SATURATION: 96 % | WEIGHT: 189 LBS | SYSTOLIC BLOOD PRESSURE: 128 MMHG | BODY MASS INDEX: 31.49 KG/M2

## 2018-11-07 DIAGNOSIS — I10 ESSENTIAL HYPERTENSION: ICD-10-CM

## 2018-11-07 DIAGNOSIS — J44.9 CHRONIC OBSTRUCTIVE PULMONARY DISEASE, UNSPECIFIED COPD TYPE (HCC): ICD-10-CM

## 2018-11-07 DIAGNOSIS — M79.89 LEG SWELLING: ICD-10-CM

## 2018-11-07 DIAGNOSIS — I27.20 PULMONARY HYPERTENSION (HCC): Primary | ICD-10-CM

## 2018-11-07 NOTE — PROGRESS NOTES
Merline Bald presents today for Chief Complaint Patient presents with  Hypertension 1 year follow up - no cardiac complaints Merline Bald preferred language for health care discussion is english/other. Is someone accompanying this pt? No  
 
Is the patient using any DME equipment during OV? Rolator Depression Screening: PHQ over the last two weeks 9/12/2017 PHQ Not Done Patient Decline Little interest or pleasure in doing things Not at all Feeling down, depressed, irritable, or hopeless Not at all Total Score PHQ 2 0 Learning Assessment: 
Learning Assessment 8/30/2016 PRIMARY LEARNER Patient PRIMARY LANGUAGE ENGLISH  
LEARNER PREFERENCE PRIMARY DEMONSTRATION  
ANSWERED BY Patient RELATIONSHIP SELF Abuse Screening: No flowsheet data found. Fall Risk No flowsheet data found. Pt currently taking Anticoagulant therapy? No  
 
Coordination of Care: 1. Have you been to the ER, urgent care clinic since your last visit? Hospitalized since your last visit? No  
 
2. Have you seen or consulted any other health care providers outside of the [address] [place] [address] since your last visit? Include any pap smears or colon screening.  No

## 2018-11-07 NOTE — PROGRESS NOTES
HISTORY OF PRESENT ILLNESS Petra Wong is a 70 y.o. female. Shortness of Breath Associated symptoms include leg swelling. Pertinent negatives include no fever, no cough, no wheezing, no PND, no orthopnea, no vomiting, no rash and no claudication. Patient presents for a follow-up office visit. She does not have a prior history of CAD. She has a history of hypertension, severe COPD, now oxygen dependent and severe secondary pulmonary hypertension managed with vasodilators by her pulmonologist. 
 
The patient was last seen in the office approximately 12 months ago. She continues to take Lasix 40 mg daily. She remains on oxygen 24 hours a day which was increased up to 6 L nasal cannula earlier this year. She denies any major change in her shortness of breath, leg swelling, or activity tolerance over the past year. No new chest pain, no palpitations, dizziness or syncope. If anything she feels her leg swelling has improved this year compared to last year. Past Medical History:  
Diagnosis Date  LALO (acute kidney injury) ([address] 12/27/2015  ARF (acute respiratory failure) ([address] 12/27/2015  Asthma  Chronic lung disease  COPD (chronic obstructive pulmonary disease) ([facility])  Dependence on continuous supplemental oxygen  Diastolic heart failure ([address]  Echocardiogram abnormal 12/29/2015 Hyperdynamic. EF 75%. No WMA. Gr 1 DDfx. Mild RVE. RVSP 60-70 mmHg (prev 120 mmHg on 10/1/15). Mild LAE. Mod-severe ELISHA. Mild-mod TR.  Gastric wall thickening 2009 Chronic Proximal Gastric Wall Calcifications.  Hyperlipidemia  Hypertension  ANNELIESE (obstructive sleep apnea)  Pulmonary arterial hypertension ([address]  Pulmonary hypertension ([address] RVSP 120 mm Hg, cor pulmonale (echo Oct 2015)  Right upper lobe pneumonia ([address] 12/27/2015 Cavitary RUL Pneumonia  TB lung, latent 1986 Diagnosed/Treated.  Thallium stress test 11/12/2002 No prior infarction. Minimal to mild apical ischemia vs apical thinning. RVE.  Tuberculosis Current Outpatient Medications Medication Sig Dispense Refill  sildenafil, antihypertensive, (REVATIO) 20 mg tablet TAKE 1 TABLET BY MOUTH THREE TIMES DAILY 90 Tab 0  
 albuterol-ipratropium (DUO-NEB) 2.5 mg-0.5 mg/3 ml nebu 3 mL by Nebulization route every six (6) hours as needed. Diag. Code: J44.9 360 Nebule 3  
 budesonide-formoterol (SYMBICORT) 80-4.5 mcg/actuation HFAA Take 1 Puff by inhalation two (2) times a day. 1 Inhaler 5  cholecalciferol (VITAMIN D3) 50,000 unit capsule Take  by mouth every seven (7) days.  budesonide-formoterol (SYMBICORT) 80-4.5 mcg/actuation HFAA Take 2 Puffs by inhalation two (2) times a day. 1 Inhaler 0  
 albuterol (PROVENTIL HFA, VENTOLIN HFA, PROAIR HFA) 90 mcg/actuation inhaler Take 2 Puffs by inhalation every four (4) hours as needed for Wheezing or Shortness of Breath. 1 Inhaler 5  
 OXYGEN-AIR DELIVERY SYSTEMS 6 L by Does Not Apply route. Uses 4 l when out  furosemide (LASIX) 40 mg tablet Take 1 Tab by mouth daily. 30 Tab 3  
 DULoxetine (CYMBALTA) 60 mg capsule Take 60 mg by mouth daily.  pravastatin (PRAVACHOL) 20 mg tablet Take 20 mg by mouth nightly.  gabapentin (NEURONTIN) 300 mg capsule Take 300 mg by mouth three (3) times daily.  Ambrisentan (LETAIRIS) 5 mg tablet Take 5 mg by mouth daily. Indications: PULMONARY ARTERIAL HYPERTENSION 30 Tab 5  
 oxymetazoline (AFRIN) 0.05 % nasal spray 2 Sprays two (2) times a day.  b complex vitamins (B-COMPLEX) tablet Take 1 Tab by mouth daily. 30 Tab 3  
 fluticasone (FLONASE) 50 mcg/actuation nasal spray 2 Sprays by Both Nostrils route daily.  benazepril (LOTENSIN) 20 mg tablet Take 20 mg by mouth daily. No Known Allergies Social History Tobacco Use  Smoking status: Former Smoker Packs/day: 2.00 Years: 20.00 Pack years: 40.00 Types: Cigarettes Last attempt to quit: 1990 Years since quittin.8  Smokeless tobacco: Never Used Substance Use Topics  Alcohol use: Yes Comment: social   
 Drug use: No  
 
 
 
Review of Systems Constitutional: Negative for chills, fever and weight loss. HENT: Negative for nosebleeds. Eyes: Negative for blurred vision and double vision. Respiratory: Negative for cough and wheezing. Cardiovascular: Positive for leg swelling. Negative for palpitations, orthopnea, claudication and PND. Gastrointestinal: Negative for heartburn, nausea and vomiting. Genitourinary: Negative for dysuria and hematuria. Musculoskeletal: Negative for falls and myalgias. Skin: Negative for rash. Neurological: Negative for dizziness and focal weakness. Endo/Heme/Allergies: Does not bruise/bleed easily. Psychiatric/Behavioral: Negative for substance abuse. Visit Vitals /80 Pulse 77 Ht 5' 5\" (1.651 m) Wt 85.7 kg (189 lb) SpO2 96% BMI 31.45 kg/m² Physical Exam  
Constitutional: She is oriented to person, place, and time. She appears well-developed and well-nourished. HENT:  
Head: Normocephalic and atraumatic. Eyes: Conjunctivae are normal.  
Neck: Neck supple. No JVD present. Carotid bruit is not present. Cardiovascular: Normal rate, regular rhythm, S1 normal, S2 normal and normal pulses. Exam reveals no gallop. No murmur heard. Pulmonary/Chest: Effort normal and breath sounds normal. She has no wheezes. She has no rales. Abdominal: Soft. Bowel sounds are normal. There is no tenderness. Musculoskeletal: She exhibits edema. Trace Neurological: She is alert and oriented to person, place, and time. Skin: Skin is warm and dry. EKG: Normal sinus rhythm, normal axis, normal QTc interval, nonspecific T-wave inversions in leads V1 and V2 likely from RV strain, poor R-wave progression. No change compared to the previous EKG.  
 
ASSESSMENT and PLAN 
 
 Severe secondary pulmonary hypertension. This is followed closely by pulmonology. She is managed with vasodilators and chronic oxygen therapy. No significant change in her shortness of breath. Hypertension. Patient's blood pressure appears reasonably well controlled on her current medical regimen. Lower extremity swelling. I suspect this is related to her chronic RV dysfunction from cor pulmonale. This has been controlled with Lasix 40 mg daily. She states her leg swelling is better than it was a year ago, so I would continue her current diuretic regimen. Severe COPD. Patient is now on 6 L of oxygen therapy 24 hours a day. Patient can continue to follow-up annually, sooner if needed

## 2018-12-07 ENCOUNTER — OFFICE VISIT (OUTPATIENT)
Dept: PULMONOLOGY | Age: 63
End: 2018-12-07

## 2018-12-07 VITALS
OXYGEN SATURATION: 94 % | RESPIRATION RATE: 20 BRPM | TEMPERATURE: 98.2 F | DIASTOLIC BLOOD PRESSURE: 72 MMHG | BODY MASS INDEX: 32.32 KG/M2 | HEART RATE: 111 BPM | SYSTOLIC BLOOD PRESSURE: 138 MMHG | WEIGHT: 194 LBS | HEIGHT: 65 IN

## 2018-12-07 DIAGNOSIS — J44.9 COPD, SEVERE (HCC): ICD-10-CM

## 2018-12-07 DIAGNOSIS — J96.11 CHRONIC RESPIRATORY FAILURE WITH HYPOXIA (HCC): ICD-10-CM

## 2018-12-07 DIAGNOSIS — Z99.81 HYPOXEMIA REQUIRING SUPPLEMENTAL OXYGEN: ICD-10-CM

## 2018-12-07 DIAGNOSIS — Z23 ENCOUNTER FOR IMMUNIZATION: ICD-10-CM

## 2018-12-07 DIAGNOSIS — G47.33 OSA (OBSTRUCTIVE SLEEP APNEA): ICD-10-CM

## 2018-12-07 DIAGNOSIS — R09.02 HYPOXEMIA REQUIRING SUPPLEMENTAL OXYGEN: ICD-10-CM

## 2018-12-07 DIAGNOSIS — I27.20 PULMONARY HTN (HCC): Primary | ICD-10-CM

## 2018-12-07 DIAGNOSIS — J84.10 PULMONARY FIBROSIS (HCC): ICD-10-CM

## 2018-12-07 NOTE — PROGRESS NOTES
Chief Complaint Patient presents with  COPD  Shortness of Breath 1. Have you been to the ER, urgent care clinic since your last visit? Hospitalized since your last visit? No 
 
2. Have you seen or consulted any other health care providers outside of the [address] [place] [address] since your last visit? Include any pap smears or colon screening. No  
 
[facility] [address] [place], [address] [facility]) [phone] ([address] [place] [address]) [phone] Simple walk test done in office today. Qualifying O2 sats:  
 
O2 Sat at rest on 4 liters O2 is : 90 %, Pulse 107, SOB scale 0 Walked: 29   m on 4 liters O2 : 86 %, Pulse 125, SOB scale 0 
    68   m on 0 O2 : 0 %, Pulse 0, SOB scale 0 
    102 m on 0 O2 : 0 %, Pulse 0, SOB scale 0 Tech comments regarding testing: Patient unable to complete simple walk test. Patient's oxygen saturation dropped to 86% while ambulating on 4 liters. Patient was recovered back on 6 liters oxygen at 93%. No distress noted or verbalized.

## 2018-12-07 NOTE — PROGRESS NOTES
HISTORY OF PRESENT ILLNESS Glory Christiansen is a 70 y.o. female. Severe COPD FEV1 37% and Pulmonary HTN, last PASP 60-70 mm Hg. Previously followed by Dr. Salgado Dinorah Patient is a 59 y. o.AA female with severe COPD and Pulmonary HTN with hypoxemia needing home oxygen with low O2 sats at baseline. She was started on Letairis 5 mg daily. She was admitted to [facility] end of Dec 2015 with respiratory failure, worsened hypoxemia, cough, blood streaked sputum. CT chest and CXR showing new RT upper lobe pneumonia and cavitation. Due to past hx of +ve PPD with INH treatment, she was in respiratory isolation. She needed to be in icu with need for BIPAP and high flow oxygen. She was taken off respiratory isolation after 3 sputum samples with negative stain for AFB. She also underwent a bronchoscopy and BAL in the icu on 1/4/16. 2 sputum samples have shown ELSA and patient started on triple antimycobacterial therapy since 2/25/16 . She took triple therapy for 5 months and due to loss of insurance coverage, patient was unable to get medications since Jul 2016. CT chest Nov 2016 shows significant improvement suggesting that the right upper lobe cavitary pneumonia was bacterial. Patient was subsequently discharged on home O2 at 7-10 lits concentration. Patient oxygenation has improved steadily and is now down to 3 lits nc O2 at home at rest and 4 lits on exertion. Pt does complain of some SOB on moderate exertion but unchanged compared with previous visits. Denies chest pain or hemoptysis. No fever or chills. Pt notes episodic SOB with wheezing, sometimes triggered by dust, fumes and strong smells, treated with Albuterol rescue inhaler. Pt was seen at [facility] for transplant evaluation. Test done also confirmed severe obstructive lung disease with reduced DLCO.  Her 6 minute walk test was marked by severe limitation of ambulation and oxygen desaturation. 
  
 
 
COPD  
 The history is provided by the patient. This is a chronic problem. Episode onset: years. The problem occurs daily. The problem has been gradually worsening. Associated symptoms include shortness of breath. Pertinent negatives include no chest pain, no abdominal pain and no headaches. The symptoms are aggravated by exertion and stress. The symptoms are relieved by rest and medications. Treatments tried: see list. The treatment provided significant relief. Review of Systems Constitutional: Positive for malaise/fatigue (improving). Negative for chills, diaphoresis and weight loss. HENT: Negative for congestion, ear discharge, ear pain, hearing loss, nosebleeds, sinus pain, sore throat and tinnitus. Eyes: Negative for blurred vision, double vision, photophobia, pain, discharge and redness. Respiratory: Positive for cough and shortness of breath. Negative for hemoptysis, sputum production and stridor. Wheezing: occasional.   
Cardiovascular: Negative for chest pain, palpitations, orthopnea, claudication and PND. Leg swelling: occasional.  
Gastrointestinal: Negative for abdominal pain, blood in stool, constipation, diarrhea, heartburn, melena, nausea and vomiting. Genitourinary: Negative for dysuria, flank pain, frequency, hematuria and urgency. Musculoskeletal: Positive for joint pain. Negative for back pain, falls, myalgias and neck pain. Skin: Negative for itching and rash. Neurological: Negative for dizziness, tingling, tremors, sensory change, speech change, focal weakness, seizures, loss of consciousness, weakness and headaches. Endo/Heme/Allergies: Negative for environmental allergies and polydipsia. Does not bruise/bleed easily. Psychiatric/Behavioral: Negative for depression, hallucinations, memory loss, substance abuse and suicidal ideas. The patient is not nervous/anxious and does not have insomnia. Past Medical History:  
Diagnosis Date  LALO (acute kidney [facility]) ([facility] 12/27/2015  ARF (acute respiratory failure) ([facility] 12/27/2015  Asthma  Chronic lung disease  COPD (chronic obstructive pulmonary disease) ([facility])  Dependence on continuous supplemental oxygen  Diastolic heart failure ([facility]  Echocardiogram abnormal 12/29/2015 Hyperdynamic. EF 75%. No WMA. Gr 1 DDfx. Mild RVE. RVSP 60-70 mmHg (prev 120 mmHg on 10/1/15). Mild LAE. Mod-severe ELISHA. Mild-mod TR.  Gastric wall thickening 2009 Chronic Proximal Gastric Wall Calcifications.  Hyperlipidemia  Hypertension  ANNELIESE (obstructive sleep apnea)  Pulmonary arterial hypertension ([facility]  Pulmonary hypertension ([facility] RVSP 120 mm Hg, cor pulmonale (echo Oct 2015)  Right upper lobe pneumonia ([facility] 12/27/2015 Cavitary RUL Pneumonia  TB lung, latent 1986 Diagnosed/Treated.  Thallium stress test 11/12/2002 No prior infarction. Minimal to mild apical ischemia vs apical thinning. RVE.  Tuberculosis Past Surgical History:  
Procedure Laterality Date  BRONCHOSCOPY  01/04/2016 Dr. Mehnaz Mathias  HX CARPAL TUNNEL RELEASE  HX DILATION AND CURETTAGE    
 HX GASTRIC BYPASS  HX HYSTERECTOMY  HX ORTHOPAEDIC    
 bone spur  HX TONSILLECTOMY  HX TUBAL LIGATION Current Outpatient Medications on File Prior to Visit Medication Sig Dispense Refill  sildenafil, antihypertensive, (REVATIO) 20 mg tablet TAKE 1 TABLET BY MOUTH THREE TIMES DAILY 90 Tab 0  
 albuterol-ipratropium (DUO-NEB) 2.5 mg-0.5 mg/3 ml nebu 3 mL by Nebulization route every six (6) hours as needed. Diag. Code: J44.9 360 Nebule 3  
 budesonide-formoterol (SYMBICORT) 80-4.5 mcg/actuation HFAA Take 1 Puff by inhalation two (2) times a day. 1 Inhaler 5  cholecalciferol (VITAMIN D3) 50,000 unit capsule Take  by mouth every seven (7) days.     
 albuterol (PROVENTIL HFA, VENTOLIN HFA, PROAIR HFA) 90 mcg/actuation inhaler Take 2 Puffs by inhalation every four (4) hours as needed for Wheezing or Shortness of Breath. 1 Inhaler 5  
 OXYGEN-AIR DELIVERY SYSTEMS 6 L by Does Not Apply route. Uses 4 l when out  furosemide (LASIX) 40 mg tablet Take 1 Tab by mouth daily. 30 Tab 3  pravastatin (PRAVACHOL) 20 mg tablet Take 20 mg by mouth nightly.  Ambrisentan (LETAIRIS) 5 mg tablet Take 5 mg by mouth daily. Indications: PULMONARY ARTERIAL HYPERTENSION 30 Tab 5  
 oxymetazoline (AFRIN) 0.05 % nasal spray 2 Sprays two (2) times a day.  fluticasone (FLONASE) 50 mcg/actuation nasal spray 2 Sprays by Both Nostrils route daily.  benazepril (LOTENSIN) 20 mg tablet Take 20 mg by mouth daily.  budesonide-formoterol (SYMBICORT) 80-4.5 mcg/actuation HFAA Take 2 Puffs by inhalation two (2) times a day. 1 Inhaler 0  
 DULoxetine (CYMBALTA) 60 mg capsule Take 60 mg by mouth daily.  gabapentin (NEURONTIN) 300 mg capsule Take 300 mg by mouth three (3) times daily.  b complex vitamins (B-COMPLEX) tablet Take 1 Tab by mouth daily. 30 Tab 3 No current facility-administered medications on file prior to visit. No Known Allergies History reviewed. No pertinent family history. Social History Socioeconomic History  Marital status: SINGLE Spouse name: Not on file  Number of children: Not on file  Years of education: Not on file  Highest education level: Not on file Social Needs  Financial resource strain: Not on file  Food insecurity - worry: Not on file  Food insecurity - inability: Not on file  Transportation needs - medical: Not on file  Transportation needs - non-medical: Not on file Occupational History  Not on file Tobacco Use  Smoking status: Former Smoker Packs/day: 2.00 Years: 20.00 Pack years: 40.00 Types: Cigarettes Last attempt to quit: 1990 Years since quittin.9  Smokeless tobacco: Never Used Substance and Sexual Activity  Alcohol use: Yes Comment: social   
 Drug use: No  
 Sexual activity: Not on file Other Topics Concern  Not on file Social History Narrative  Not on file Blood pressure 138/72, pulse (!) 111, temperature 98.2 °F (36.8 °C), temperature source Oral, resp. rate 20, height 5' 5\" (1.651 m), weight 88 kg (194 lb), SpO2 94 %. Ambulatory oximetry per nurse note Physical Exam  
Constitutional: She is oriented to person, place, and time. She appears well-developed and well-nourished. No distress. HENT:  
Head: Normocephalic. Nose: Nose normal.  
Mouth/Throat: No oropharyngeal exudate. Upper dentures, carious teeth Eyes: Conjunctivae and EOM are normal. Pupils are equal, round, and reactive to light. Right eye exhibits no discharge. Left eye exhibits no discharge. No scleral icterus. Neck: No JVD present. No tracheal deviation present. No thyromegaly present. Cardiovascular: Normal rate, regular rhythm, normal heart sounds and intact distal pulses. Exam reveals no gallop. No murmur heard. Pulmonary/Chest: Effort normal. No stridor. No respiratory distress. She has no wheezes. She has no rales. She exhibits no tenderness. Diminished breath sounds Abdominal: Soft. She exhibits no mass. There is no tenderness. There is no rebound. Musculoskeletal: She exhibits deformity. She exhibits no tenderness. Edema: trace. Lymphadenopathy:  
  She has no cervical adenopathy. Neurological: She is alert and oriented to person, place, and time. Coordination normal.  
Skin: Skin is warm and dry. No rash noted. She is not diaphoretic. No erythema. No pallor. Psychiatric: She has a normal mood and affect. Her behavior is normal. Judgment and thought content normal.  
 
CT Results (most recent): 
Results from Hospital Encounter encounter on 09/29/17 CT CHEST WO CONT  Narrative CT scan of chest,[without] intravenous contrast: 
 
 
 
INDICATION: 
 
 [Cavitary pneumonia. History of hypertension, COPD, diastolic heart failure, and pulmonary 
hypertension. TECHNIQUE: 
 
Multidetector CT scan with 5 mm slice thickness, without intravenous contrast, 
including chest and subphrenic areas. 2-D sagittal and coronal images reformatted. All CT scans at this facility are performed using dose optimization technique as 
appropriate to a  performed  examination, to include automated exposer control, 
adjustment mA and / or  KV according to patient size (including appropriate 
matching  for site specific examination), or use  of iterative  reconstruction 
technique. COMPARISON STUDY: CT scan of chest on 11/9/2016. 
-------------------------------------------------------------------- 
 
FINDINGS: 
 
 
 
Thoracic inlet and supraclavicular areas: No diagnostic finding. Aortopulmonary window area:[ No diagnostic finding.] Right paratracheal  area: [In the precarinal location there is a lymph node 
measuring 2.2 cm x 1.26 cm, unchanged as compared to previous study. Edwina Harris ] Subcarinal area,:[ No diagnostic finding.] Right pulmonary hilum:[ No diagnostic finding.] There are few small calcified 
lymph nodes at right pulmonary hilum. Left pulmonary hilum:[ No diagnostic finding.] Few small calcified lymph nodes 
at the left pulmonary hilum. Thoracic aorta: The diameter of ascending thoracic aorta is 2.5 cm. Mild-to-moderate calcified plaques in arch of aorta. Pulmonary arterial system: [Right pulmonary artery measures 2.55 cm in diameter. The left pulmonary artery measures 2.9 cm in diameter. The study is noncontrast 
study. ---------------------------------------------------------- Lung parenchyma, bilateral: 
 
 
 
The study shows marked pulmonary emphysema in upper lobes of both lungs, right 
greater than left. There are bullous emphysematous changes in right upper lobe.  
In the middle zones of both lungs there are moderate emphysematous changes. In 
the lower zones of both lung there are mild to moderate emphysematous changes 
noted. The study also demonstrates mild generalized interstitial fibrotic changes in 
both lungs. At the medial aspect of right upper lobe, right perihilar area and at the 
posterior aspect of posterior segment of right upper lobe there are focal 
fibrotic changes, similar to previous study. In the lower portion of right 
middle lobe there are moderate fibrotic/chronic atelectatic changes, which are 
similar to previous study. At the upper anterior aspect of right lower lobe there are mild focal discoid 
atelectatic changes and fibrotic changes. In rest of right lower lobe there is 
no other focal abnormality or acute process identified. There are emphysematous changes in left upper lobe. At the posterior aspect of 
base of left upper lobe there are mild focal fibrotic or minimal chronic 
atelectatic changes noted. In the lower lingula of left lung there are focal 
patchy fibrotic/atelectatic changes, similar to previous study. In anterior segment of left lower lobe there are focal mild bullous 
emphysematous changes with surrounding mild fibrotic changes, similar to 
previous study. There is no definable pneumonic infiltrates or confluent 
atelectasis in lungs at this time. There is no definable lung abscess or cavitary pneumonia. There is no finding suspicious for acute pulmonary tuberculosis. 
 
--------------------------------------------------------------- Pleural spaces:[ No evidence of pleural effusion] Jason Gavin [ No evidence of pleural thickening or pleural plaque formation or pleural 
calcification. No evidence of pneumothorax. Bony thorax:[Mild to moderate multilevel spondylosis in thoracic spine. Visualized portion of liver:[ Unremarkable.] Gallbladder: There are gallstones in visualized upper portion of gallbladder. Spleen: [ No diagnostic Finding] Tiny calcified granulomas. Adrenal glands : [no diagnostic finding] Any other pertinent finding in upper abdomen:[ None.] Hiatal hernia:[ Small hiatal hernia. Evidence of previous gastric bypass 
surgery. .] Esophagus: [ No obvious diagnostic finding] Soft tissues of chest wall: No diagnostic finding. 
 
--------------------------------------------------------------- Impression IMPRESSION: 
 
1. No evidence of cavitary pneumonia or any other type of pneumonia or acute 
process in lungs at this time. 2. 
No evidence of active pulmonary tuberculosis. 3. 
Redemonstration of marked pulmonary emphysema in upper lungs bilaterally, right 
greater than left. Mild-to-moderate pulmonary emphysema in mid and lower lungs 
bilaterally, as before. 4. 
There are scattered fibrotic changes in both lungs, similar to previous study. 5. 
No evidence of discrete mass or nodule in either lung. 6. 
No evidence of pathologic lymphadenopathy or interval change in mediastinum. 7. 
Cholelithiasis. 9. 
Additional findings as above in body of report. Outside PFT: Severe obstruction with reduced DLCO 
 
ASSESSMENT and PLAN Encounter Diagnoses Name Primary?  Pulmonary HTN ([address] Yes  COPD, severe ([address]  Chronic respiratory failure with hypoxia ([address]  Hypoxemia requiring supplemental oxygen  ANNELIESE (obstructive sleep apnea)  Encounter for immunization Pt with Pulmonary HTN Grp 1 per  history, however pt also with advanced COPD, so possibly Grp 3 also. Pt was evaluated for possible transplant but could not go forward with it due to family and logistic problems. Reviewed ambulatory oxymetry with pt, will continue O2 6 LNC with medallion. Continue rescue Albuterol and continue LABA/ICS and LAMA. Inhaler technique reinforced to pt. Schedule chest CT to follow ILD Will continue current Letairis and Sildenafil until outside records are available. Defer further  transplant evaluation. RTC 6 months Darwin Becker next visit

## 2018-12-18 ENCOUNTER — HOSPITAL ENCOUNTER (OUTPATIENT)
Dept: CT IMAGING | Age: 63
Discharge: HOME | End: 2018-12-18
Attending: INTERNAL MEDICINE
Payer: MEDICARE

## 2018-12-18 ENCOUNTER — HOSPITAL ENCOUNTER (OUTPATIENT)
Dept: LAB | Age: 63
Discharge: HOME | End: 2018-12-18
Payer: MEDICARE

## 2018-12-18 DIAGNOSIS — E11.9 DIABETES MELLITUS (HCC): ICD-10-CM

## 2018-12-18 DIAGNOSIS — J84.10 PULMONARY FIBROSIS (HCC): ICD-10-CM

## 2018-12-18 DIAGNOSIS — E55.9 AVITAMINOSIS D: ICD-10-CM

## 2018-12-18 LAB
25(OH)D3 SERPL-MCNC: 37.5 NG/ML (ref 30–100)
HBA1C MFR BLD: 6.5 % (ref 4.2–5.6)

## 2018-12-18 PROCEDURE — 83036 HEMOGLOBIN GLYCOSYLATED A1C: CPT

## 2018-12-18 PROCEDURE — 71250 CT THORAX DX C-: CPT

## 2018-12-18 PROCEDURE — 82306 VITAMIN D 25 HYDROXY: CPT

## 2018-12-18 PROCEDURE — 36415 COLL VENOUS BLD VENIPUNCTURE: CPT

## 2019-06-21 ENCOUNTER — TELEPHONE (OUTPATIENT)
Dept: PULMONOLOGY | Age: 64
End: 2019-06-21

## 2019-06-21 NOTE — TELEPHONE ENCOUNTER
Aleida from [facility] pt has switched to a generic medication so is being dishcarged from [facility]. Please advise .

## 2019-06-21 NOTE — TELEPHONE ENCOUNTER
[facility] called to give FYI they are no longer able to supply pt with Letaris. [employer] substituted rx to a generic since available.

## 2019-08-06 ENCOUNTER — OFFICE VISIT (OUTPATIENT)
Dept: PULMONOLOGY | Age: 64
End: 2019-08-06

## 2019-08-06 VITALS
RESPIRATION RATE: 20 BRPM | OXYGEN SATURATION: 97 % | TEMPERATURE: 98.1 F | HEART RATE: 75 BPM | DIASTOLIC BLOOD PRESSURE: 86 MMHG | BODY MASS INDEX: 32.62 KG/M2 | WEIGHT: 195.8 LBS | SYSTOLIC BLOOD PRESSURE: 142 MMHG | HEIGHT: 65 IN

## 2019-08-06 DIAGNOSIS — I27.20 PULMONARY HTN (HCC): Primary | ICD-10-CM

## 2019-08-06 DIAGNOSIS — Z86.19 HISTORY OF MAI INFECTION: ICD-10-CM

## 2019-08-06 DIAGNOSIS — R09.02 HYPOXEMIA REQUIRING SUPPLEMENTAL OXYGEN: ICD-10-CM

## 2019-08-06 DIAGNOSIS — Z99.81 HYPOXEMIA REQUIRING SUPPLEMENTAL OXYGEN: ICD-10-CM

## 2019-08-06 DIAGNOSIS — J44.9 COPD, SEVERE (HCC): ICD-10-CM

## 2019-08-06 DIAGNOSIS — F32.9 DEPRESSION, REACTIVE: ICD-10-CM

## 2019-08-06 DIAGNOSIS — R91.1 LUNG NODULE: ICD-10-CM

## 2019-08-06 RX ORDER — METOPROLOL TARTRATE 100 MG/1
100 TABLET ORAL
COMMUNITY
Start: 2019-08-06 | End: 2019-08-06

## 2019-08-06 RX ORDER — ASPIRIN 81 MG/1
81 TABLET ORAL
COMMUNITY
Start: 2019-08-06 | End: 2019-08-06

## 2019-08-06 NOTE — PROGRESS NOTES
Verbal Order with read back per Dr. Danny Yang MD  For PFT smart panel. Gas Dilute/ wash out lung vol w/wo distrib  Vol &  Diffusing capacity    Dr. Danny Yang MD will co-sign the orders.

## 2019-08-06 NOTE — PROGRESS NOTES
Verbal Order with read back per Nile Baker MD  For PFT smart panel. AMB POC PFT complete w/ bronchodilator  AMB POC PFT complete w/o bronchodilator    Dr. Jeaneth Ortiz MD will co-sign the orders.

## 2019-08-06 NOTE — PROGRESS NOTES
Zana Kohli presents today for   Chief Complaint   Patient presents with    COPD     followu pfor 12/7/2018    Pulmonary Fibrosis    Other     pulmoanry HTn, respiratory failure with hypoxia, hypoxemia    Sleep Apnea     CT 12/18/2018    Results       Is someone accompanying this pt? No    Is the patient using any DME equipment during OV? Yes. Rollator, O2    -[employer]    Depression Screening:  3 most recent PHQ Screens 8/6/2019   PHQ Not Done -   Little interest or pleasure in doing things Not at all   Feeling down, depressed, irritable, or hopeless Not at all   Total Score PHQ 2 0       Learning Assessment:  Learning Assessment 8/30/2016   PRIMARY LEARNER Patient   PRIMARY LANGUAGE ENGLISH   LEARNER PREFERENCE PRIMARY DEMONSTRATION   ANSWERED BY Patient   RELATIONSHIP SELF       Abuse Screening:  Abuse Screening Questionnaire 8/6/2019   Do you ever feel afraid of your partner? N   Are you in a relationship with someone who physically or mentally threatens you? N   Is it safe for you to go home? Y       Fall Risk  Fall Risk Assessment, last 12 mths 8/6/2019   Able to walk? Yes   Fall in past 12 months? No         Coordination of Care:  1. Have you been to the ER, urgent care clinic since your last visit? Hospitalized since your last visit? No    2. Have you seen or consulted any other health care providers outside of the [address] [place] [address] since your last visit? Include any pap smears or colon screening.  No

## 2019-08-06 NOTE — PROGRESS NOTES
HISTORY OF PRESENT ILLNESS  Narciso Ny is a 75 y.o. female. Severe COPD FEV1 37% and Pulmonary arterial HTN, last PASP 60-70 mm Hg. Patient is a 59 y. o.AA female with severe COPD and Pulmonary HTN with hypoxemia needing home oxygen with low O2 sats at baseline. She was started on Letairis 5 mg daily. She was admitted to [facility] end of Dec 2015 with respiratory failure, worsened hypoxemia, cough, blood streaked sputum. CT chest and CXR showing new RT upper lobe pneumonia and cavitation. Due to past hx of +ve PPD with INH treatment, she was in respiratory isolation. She needed to be in icu with need for BIPAP and high flow oxygen. She was taken off respiratory isolation after 3 sputum samples with negative stain for AFB. She also underwent a bronchoscopy and BAL in the icu on 1/4/16. Pt complains of worsening SOB on mild to moderate exertion and is requesting referral for maintenance pulmonary rehab as she had experienced significant improvement during this before. Denies chest pain or hemoptysis. No fever or chills. Pt notes episodic SOB with wheezing, sometimes triggered by dust, fumes and strong smells, helped with Albuterol rescue inhaler. Pt was seen at [facility] for transplant evaluation. Test done confirmed severe obstructive lung disease with reduced DLCO. Her 6 minute walk test was marked by severe limitation of ambulation and oxygen desaturation. Pt also expressed worsening depression over family stressors.         Review of Systems   Constitutional: Positive for malaise/fatigue (improving). Negative for chills, diaphoresis, fever and weight loss. Weight gain   HENT: Negative for congestion, ear discharge, ear pain, hearing loss, nosebleeds, sinus pain, sore throat and tinnitus. Eyes: Negative for blurred vision, double vision, photophobia, pain, discharge and redness. Respiratory: Positive for cough and shortness of breath. Negative for hemoptysis, sputum production and stridor. Wheezing: occasional.    Cardiovascular: Negative for chest pain, palpitations, orthopnea, claudication and PND. Leg swelling: worsening. Gastrointestinal: Negative for abdominal pain, blood in stool, constipation, diarrhea, heartburn, melena, nausea and vomiting. Genitourinary: Negative for dysuria, flank pain, frequency, hematuria and urgency. Musculoskeletal: Positive for joint pain. Negative for back pain, falls, myalgias and neck pain. Skin: Negative for itching and rash. Neurological: Negative for dizziness, tingling, tremors, sensory change, speech change, focal weakness, seizures, loss of consciousness, weakness and headaches. Endo/Heme/Allergies: Negative for environmental allergies and polydipsia. Does not bruise/bleed easily. Psychiatric/Behavioral: Negative for depression, hallucinations, memory loss, substance abuse and suicidal ideas. The patient is not nervous/anxious and does not have insomnia. Past Medical History:   Diagnosis Date    LALO (acute kidney injury) ([facility] [address] 12/27/2015    ARF (acute respiratory failure) ([facility] [address] 12/27/2015    Asthma     Chronic lung disease     COPD [facility]     Dependence on continuous supplemental oxygen     Diastolic heart failure ([facility])     Echocardiogram abnormal 12/29/2015    Hyperdynamic. EF 75%. No WMA. Gr 1 DDfx. Mild RVE. RVSP 60-70 mmHg (prev 120 mmHg on 10/1/15). Mild LAE. Mod-severe ELISHA. Mild-mod TR.  Gastric wall thickening 2009    Chronic Proximal Gastric Wall Calcifications.  Hyperlipidemia     Hypertension     ANNELIESE (obstructive sleep apnea)     Pulmonary arterial hypertension ([facility])     Pulmonary hypertension ([facility])     RVSP 120 mm Hg, cor pulmonale (echo Oct 2015)    Right upper lobe pneumonia ([facility] [address] 12/27/2015    Cavitary RUL Pneumonia    TB lung, latent 1986    Diagnosed/Treated.  Thallium stress test 11/12/2002    No prior infarction.   Minimal to mild apical ischemia vs apical thinning. RVE.  Tuberculosis      Past Surgical History:   Procedure Laterality Date    BRONCHOSCOPY  01/04/2016    Dr. Dea Oneal HX DILATION AND CURETTAGE      HX GASTRIC BYPASS      HX HYSTERECTOMY      HX ORTHOPAEDIC      bone spur    HX TONSILLECTOMY      HX TUBAL LIGATION       Current Outpatient Medications on File Prior to Visit   Medication Sig Dispense Refill    sildenafil, antihypertensive, (REVATIO) 20 mg tablet TAKE 1 TABLET BY MOUTH THREE TIMES DAILY 90 Tab 0    albuterol-ipratropium (DUO-NEB) 2.5 mg-0.5 mg/3 ml nebu 3 mL by Nebulization route every six (6) hours as needed. Diag. Code: J44.9 360 Nebule 3    budesonide-formoterol (SYMBICORT) 80-4.5 mcg/actuation HFAA Take 1 Puff by inhalation two (2) times a day. 1 Inhaler 5    cholecalciferol (VITAMIN D3) 50,000 unit capsule Take 50,000 Units by mouth every seven (7) days.  albuterol (PROVENTIL HFA, VENTOLIN HFA, PROAIR HFA) 90 mcg/actuation inhaler Take 2 Puffs by inhalation every four (4) hours as needed for Wheezing or Shortness of Breath. 1 Inhaler 5    OXYGEN-AIR DELIVERY SYSTEMS 6 L by Does Not Apply route. Uses 4 l when out   Indications: DME: [facility]      furosemide (LASIX) 40 mg tablet Take 1 Tab by mouth daily. 30 Tab 3    pravastatin (PRAVACHOL) 20 mg tablet Take 20 mg by mouth nightly.  Ambrisentan (LETAIRIS) 5 mg tablet Take 5 mg by mouth daily. Indications: PULMONARY ARTERIAL HYPERTENSION 30 Tab 5    b complex vitamins (B-COMPLEX) tablet Take 1 Tab by mouth daily. 30 Tab 3    benazepril (LOTENSIN) 20 mg tablet Take 20 mg by mouth daily.  metoprolol tartrate (LOPRESSOR) 100 mg IR tablet Take 100 mg by mouth.  ipratropium (ATROVENT HFA) 17 mcg/actuation inhaler Take 2 Puffs by inhalation every six (6) hours as needed.  aspirin delayed-release 81 mg tablet Take 81 mg by mouth.  DULoxetine (CYMBALTA) 60 mg capsule Take 60 mg by mouth daily.       gabapentin (NEURONTIN) 300 mg capsule Take 300 mg by mouth three (3) times daily.  oxymetazoline (AFRIN) 0.05 % nasal spray 2 Sprays two (2) times a day.  fluticasone (FLONASE) 50 mcg/actuation nasal spray 2 Sprays by Both Nostrils route daily. No current facility-administered medications on file prior to visit.       No Known Allergies    Family History   Problem Relation Age of Onset    Asthma Maternal Aunt     Diabetes Daughter      Social History     Socioeconomic History    Marital status: SINGLE     Spouse name: Not on file    Number of children: Not on file    Years of education: Not on file    Highest education level: Not on file   Occupational History    Not on file   Social Needs    Financial resource strain: Not on file    Food insecurity:     Worry: Not on file     Inability: Not on file    Transportation needs:     Medical: Not on file     Non-medical: Not on file   Tobacco Use    Smoking status: Former Smoker     Packs/day: 2.00     Years: 20.00     Pack years: 40.00     Types: Cigarettes     Last attempt to quit: 1990     Years since quittin.6    Smokeless tobacco: Never Used   Substance and Sexual Activity    Alcohol use: Yes     Comment: social     Drug use: No    Sexual activity: Not on file   Lifestyle    Physical activity:     Days per week: Not on file     Minutes per session: Not on file    Stress: Not on file   Relationships    Social connections:     Talks on phone: Not on file     Gets together: Not on file     Attends Church service: Not on file     Active member of club or organization: Not on file     Attends meetings of clubs or organizations: Not on file     Relationship status: Not on file    Intimate partner violence:     Fear of current or ex partner: Not on file     Emotionally abused: Not on file     Physically abused: Not on file     Forced sexual activity: Not on file   Other Topics Concern    Not on file   Social History Narrative  Not on file     Blood pressure 142/86, pulse 75, temperature 98.1 °F (36.7 °C), temperature source Oral, resp. rate 20, height 5' 5\" (1.651 m), weight 88.8 kg (195 lb 12.8 oz), SpO2 97 %. Ambulatory oximetry per nurse note  Physical Exam   Constitutional: She is oriented to person, place, and time. She appears well-developed and well-nourished. No distress. HENT:   Head: Normocephalic. Nose: Nose normal.   Mouth/Throat: No oropharyngeal exudate. Upper dentures, carious teeth   Eyes: Pupils are equal, round, and reactive to light. Conjunctivae and EOM are normal. Right eye exhibits no discharge. Left eye exhibits no discharge. No scleral icterus. Neck: No JVD present. No tracheal deviation present. No thyromegaly present. Cardiovascular: Normal rate, regular rhythm, normal heart sounds and intact distal pulses. Exam reveals no gallop and no friction rub. No murmur heard. Pulmonary/Chest: Effort normal. No stridor. No respiratory distress. She has no wheezes. She has no rales. She exhibits no tenderness. Diminished breath sounds   Abdominal: Soft. She exhibits no mass. There is no tenderness. There is no rebound. Musculoskeletal: She exhibits no tenderness or deformity. Edema: 1+   Lymphadenopathy:     She has no cervical adenopathy. Neurological: She is alert and oriented to person, place, and time. Coordination normal.   Skin: Skin is warm and dry. No rash noted. She is not diaphoretic. No erythema. No pallor. Psychiatric: She has a normal mood and affect. Her behavior is normal. Judgment and thought content normal.     CT Results (most recent):  Results from Hospital Encounter encounter on 12/18/18   CT CHEST WO CONT    Narrative EXAM:  CT CHEST WO CONT    INDICATION: 68-year-old female with cavitary lung lesion    COMPARISON: 9/29/2017. CONTRAST:  None.     TECHNIQUE: Unenhanced multislice helical CT was performed from the thoracic  inlet to the adrenal glands without intravenous contrast administration. Contiguous 5 mm axial images were reconstructed and lung and soft tissue windows  were generated. Coronal and sagittal reformations were generated. CT dose  reduction was achieved through use of a standardized protocol tailored for this  examination and automatic exposure control for dose modulation. FINDINGS: The lungs demonstrate severe emphysema, most prominent in the right  upper lobe. No cavitary lesion is identified. Nodular density measuring 8 x 7 mm  is noted in the right middle lobe with adjacent scarring. The lungs are clear of airspace disease or edema. The absence of intravenous contrast reduces the sensitivity for evaluation of  the mediastinum and upper abdominal organs. The aorta has a normal contour with no evidence of aneurysm. Coronary artery and thoracic aorta calcifications are present. No mediastinal or hilar or axillary adenopathy is appreciated. The visualized portions of the upper abdominal organs are normal.  Hiatal hernia is present. Gallstones are noted. Para-aortic cyst at the aorta  hiatus measures 1.8 x 1.4 cm, unchanged. Impression IMPRESSION:   1. No pulmonary cavitary lesion. Subcentimeter nodular density in the right  middle lobe adjacent to adjacent scarring. 2. Severe emphysema. Recommend a 6 month follow-up CT of chest without IV contrast for reevaluation. PFT: severe obstruction FEV1 38% with reversible component. DLCO reduced to 31%    ASSESSMENT and PLAN  Encounter Diagnoses   Name Primary?  Pulmonary HTN (HCC) Yes    COPD, severe ([address]     Hypoxemia requiring supplemental oxygen     History of ELSA infection     Depression, reactive      Pt with Pulmonary HTN Grp 1 per  history, however pt also with advanced COPD, so possibly Grp 3 as well. Worsening dyspnea may be related to worsening obstruction but will need to track R sided pressures. Follow up Echo ordered. Add Spiriva 2.5 to regimen.  Sample and inhaler instructions given. Reviewed PFT results with pt. Continue O2 6 LNC with medallion. Continue rescue Albuterol and continue LABA/ICS. Inhaler technique reinforced to pt. Schedule chest CT to follow lung nodule. Will continue current Letairis and Sildenafil but adjust medications pending Echo results. Defer further  transplant evaluation. RTC 6 months  Reviewed PFT with pt  Will call pt with CT results.   Further intervention pending test results

## 2019-08-09 ENCOUNTER — TELEPHONE (OUTPATIENT)
Dept: PULMONOLOGY | Age: 64
End: 2019-08-09

## 2019-08-15 ENCOUNTER — HOSPITAL ENCOUNTER (OUTPATIENT)
Dept: NON INVASIVE DIAGNOSTICS | Age: 64
Discharge: HOME | End: 2019-08-15
Attending: INTERNAL MEDICINE
Payer: MEDICARE

## 2019-08-15 ENCOUNTER — HOSPITAL ENCOUNTER (OUTPATIENT)
Dept: CT IMAGING | Age: 64
Discharge: HOME | End: 2019-08-15
Attending: INTERNAL MEDICINE
Payer: MEDICARE

## 2019-08-15 VITALS
SYSTOLIC BLOOD PRESSURE: 142 MMHG | DIASTOLIC BLOOD PRESSURE: 86 MMHG | BODY MASS INDEX: 32.49 KG/M2 | HEIGHT: 65 IN | WEIGHT: 195 LBS

## 2019-08-15 DIAGNOSIS — I27.20 PULMONARY HTN (HCC): ICD-10-CM

## 2019-08-15 DIAGNOSIS — R91.1 LUNG NODULE: ICD-10-CM

## 2019-08-15 PROCEDURE — 93325 DOPPLER ECHO COLOR FLOW MAPG: CPT

## 2019-08-15 PROCEDURE — 71250 CT THORAX DX C-: CPT

## 2019-08-27 ENCOUNTER — CLINICAL SUPPORT (OUTPATIENT)
Dept: PULMONOLOGY | Age: 64
End: 2019-08-27

## 2019-08-27 VITALS
HEART RATE: 103 BPM | BODY MASS INDEX: 31.99 KG/M2 | HEIGHT: 65 IN | WEIGHT: 192 LBS | SYSTOLIC BLOOD PRESSURE: 150 MMHG | DIASTOLIC BLOOD PRESSURE: 80 MMHG | OXYGEN SATURATION: 70 % | TEMPERATURE: 98.7 F | RESPIRATION RATE: 24 BRPM

## 2019-08-27 DIAGNOSIS — J44.9 CHRONIC OBSTRUCTIVE PULMONARY DISEASE, UNSPECIFIED COPD TYPE (HCC): ICD-10-CM

## 2019-08-27 DIAGNOSIS — J45.909 UNCOMPLICATED ASTHMA, UNSPECIFIED ASTHMA SEVERITY, UNSPECIFIED WHETHER PERSISTENT: Primary | ICD-10-CM

## 2019-08-27 NOTE — PROGRESS NOTES
Six Minute Walk Test (6MWT) recording form    Rahel Del Castillo       [id number]                                    1955 female  [id number]    [x]  Medical history checked  [x]  Medical clearance provided for the patient to participate in exercise testing      Contraindications to 6MWT:  [] Resting heart rate > 120 beats / min after 10 minutes rest (relative contraindication)  [] Systolic blood pressure > 180 mm Hg +/- diastolic blood pressure > 100 mm Hg (relative contraindication)  [x] Resting SpO2 < 85% on room air or on prescribed level of supplemental oxygen  [] Physical disability preventing safe performance  [] No contraindications identified             6MWT        8/27/2019  10:54 AM       Supplemental Oxygen - Yes  Mobility Aid -Rolling Walker       Time Mins BP SP02 HR RR Distance Walked Rests/Comments   Rest 150/80 70 103 25  Room Air and at rest     1  78 103 25 At rest 2/lpm Nasal 02 continuous Flow      2  82 103 25 At rest 3/lpm Nasal 02 continuous Flow     3  86 102 25 At rest 4/lpm Nasal 02 continuous Flow     4  88 100 25 At rest 5/lpm Nasal 02 continuous Flow     5  94 89 25 At rest 6/lpm Nasal 02 continuous Flow     6  92 90 26 .5 Lap (17 meters) 6/lpm Nasal 02 continuous Flow     Recovery 1   150/80     93   105   27    6/lpm Nasal 02 continuous Flow     Recovery 2 140/80 94 84 24  6/lpm Nasal 02 continuous Flow       Total distance:    17 meters       Symptom recovery:   2 minutes      HR recovery:   2 minutes                           Limiting factor:    Cannot walk without walker and 02                                          Was test terminated: [] No   [x] Yes  If yes, when? Half a lap due to severe shortness of breath                                                              6MWT Termination Criteria:  [] Chest pain or angina-like symptons  [] Heart rate > Predicted HR max.   [] Evolving mental confusioin, light-headedness or incoordination  [x] Physical or verbal severe fatigue [x] Intolerable dyspnea, unrelieved by rest  [] Persistent SP02 < 85% (Note pending clinical presentation)  [] Abnormal gait pattern (leg cramps, staggering, ataxia)  [] Other clinically warranted reason     INTERPRETATION:      Comparision 6 min walk distance:      RECOMMENDATION:      Alysa Barron, RT

## 2019-09-17 ENCOUNTER — TELEPHONE (OUTPATIENT)
Dept: PULMONOLOGY | Age: 64
End: 2019-09-17

## 2019-09-17 NOTE — TELEPHONE ENCOUNTER
Pulmonary Rehab called patient and left a message. Additional attempts at contact will be made.     Thank you,  Aiden Chaparro

## 2019-10-25 ENCOUNTER — TELEPHONE (OUTPATIENT)
Dept: PULMONOLOGY | Age: 64
End: 2019-10-25

## 2019-10-25 NOTE — TELEPHONE ENCOUNTER
Pulmonary Rehab called patient and spoke to her about the program. She is interested in and has an evaluation on 10/28/19 at 12:30. Thank you, Penelope Frausto

## 2019-10-28 ENCOUNTER — TELEPHONE (OUTPATIENT)
Dept: CARDIAC REHAB | Age: 64
End: 2019-10-28

## 2019-11-06 DIAGNOSIS — I27.20 PULMONARY HYPERTENSION (HCC): ICD-10-CM

## 2019-11-06 RX ORDER — AMBRISENTAN 5 MG/1
TABLET, FILM COATED ORAL
Qty: 30 TAB | Refills: 5 | Status: SHIPPED | OUTPATIENT
Start: 2019-11-06 | End: 2020-04-22 | Stop reason: SDUPTHER

## 2020-01-08 ENCOUNTER — TELEPHONE (OUTPATIENT)
Dept: PULMONOLOGY | Age: 65
End: 2020-01-08

## 2020-01-09 RX ORDER — ALBUTEROL SULFATE 90 UG/1
2 AEROSOL, METERED RESPIRATORY (INHALATION)
Qty: 3 INHALER | Refills: 1 | Status: SHIPPED | COMMUNITY
Start: 2020-01-09 | End: 2021-02-24 | Stop reason: SDUPTHER

## 2020-01-09 RX ORDER — BUDESONIDE AND FORMOTEROL FUMARATE DIHYDRATE 80; 4.5 UG/1; UG/1
1 AEROSOL RESPIRATORY (INHALATION) 2 TIMES DAILY
Qty: 3 INHALER | Refills: 1 | Status: SHIPPED | COMMUNITY
Start: 2020-01-09 | End: 2021-02-24 | Stop reason: SDUPTHER

## 2020-04-03 ENCOUNTER — TELEPHONE (OUTPATIENT)
Dept: PULMONOLOGY | Age: 65
End: 2020-04-03

## 2020-04-03 NOTE — TELEPHONE ENCOUNTER
Madinafang had requested new PA for Letairis through cover my meds. I started the PA and was listed as ERROR due to not finding this patient in system. I called Orin on card and they inform me she does not have coverage as of 3/31/30. Her old ID# [id number] no longer active. I tried calling patient to see if she has new insurance but her number is not working

## 2020-04-10 ENCOUNTER — TELEPHONE (OUTPATIENT)
Dept: PULMONOLOGY | Age: 65
End: 2020-04-10

## 2020-04-10 NOTE — TELEPHONE ENCOUNTER
Spoke with pt. Pt just changes insurance to [employer] from [facility]. The Kaden had to have a PA which was submitted 2 days ago but no answer yet. She states she is trying to get the Symbicort and Spirva filled at [facility] but where going to be expensive but [employer] indicated they would be covered and the formular shows they are both on there. Pt has not given [facility] the new insurance information. She will do that today so they can fill with correct insurance. If any other problems pt to call us back

## 2020-04-20 NOTE — TELEPHONE ENCOUNTER
Spoke with daughter. Pt switched to new insurance [employer] and she has a deductible per the pharmacy to the daughter. Daughter advised she must meet the deductible for whatever med she gets filled. Both of the meds are on the plan. Daughter understands and will also find out what her new speciality pharmacy is sp the Ambrisentan can be sent to them.

## 2020-04-20 NOTE — TELEPHONE ENCOUNTER
Per pt, she has changed insurance to [facility] and they do not cover her Spiriva or Symbicort. Can she get something else? Per dtr, one would cost her over $100 and the other over $80. 
 
Also, [facility] does not have her Ambrisentan.

## 2020-04-22 ENCOUNTER — TELEPHONE (OUTPATIENT)
Dept: PULMONOLOGY | Age: 65
End: 2020-04-22

## 2020-04-22 DIAGNOSIS — I27.20 PULMONARY HYPERTENSION (HCC): ICD-10-CM

## 2020-04-22 NOTE — TELEPHONE ENCOUNTER
Ed from [facility]). Received script for Pintics and they do not have it or the generic. Please call back.

## 2020-04-22 NOTE — TELEPHONE ENCOUNTER
Spoke with daughter about the Sourav. We did a PA and the medication was approved with new insurance [employer]. They still would have to pay $900 but the pharmacist gave the information to get assistance and they have started that process.

## 2020-04-22 NOTE — TELEPHONE ENCOUNTER
Pt's daughter, Laurann Harrison called([phone]). She wants to talk to [facility] regarding pt's medications. Please call back.

## 2020-04-23 RX ORDER — AMBRISENTAN 5 MG/1
TABLET, FILM COATED ORAL
Qty: 30 TAB | Refills: 11 | Status: SHIPPED | OUTPATIENT
Start: 2020-04-23 | End: 2020-06-08 | Stop reason: SDUPTHER

## 2020-06-08 DIAGNOSIS — I27.20 PULMONARY HYPERTENSION (HCC): ICD-10-CM

## 2020-06-08 RX ORDER — AMBRISENTAN 5 MG/1
TABLET, FILM COATED ORAL
Qty: 30 TAB | Refills: 11 | Status: SHIPPED | COMMUNITY
Start: 2020-06-08 | End: 2021-04-19 | Stop reason: SDUPTHER

## 2020-06-08 NOTE — TELEPHONE ENCOUNTER
Per dtr, pt now has [employer] again.   She needs her Ambrisentan sent to [place] [id number] for renewal.

## 2021-01-06 ENCOUNTER — DOCUMENTATION ONLY (OUTPATIENT)
Dept: PULMONOLOGY | Age: 66
End: 2021-01-06

## 2021-02-12 ENCOUNTER — OFFICE VISIT (OUTPATIENT)
Dept: CARDIOLOGY CLINIC | Age: 66
End: 2021-02-12
Payer: MEDICARE

## 2021-02-12 VITALS
HEART RATE: 79 BPM | HEIGHT: 65 IN | WEIGHT: 194 LBS | BODY MASS INDEX: 32.32 KG/M2 | OXYGEN SATURATION: 100 % | DIASTOLIC BLOOD PRESSURE: 86 MMHG | SYSTOLIC BLOOD PRESSURE: 146 MMHG

## 2021-02-12 DIAGNOSIS — I27.20 PULMONARY HTN (HCC): ICD-10-CM

## 2021-02-12 DIAGNOSIS — M79.89 LEG SWELLING: ICD-10-CM

## 2021-02-12 DIAGNOSIS — J44.9 CHRONIC OBSTRUCTIVE PULMONARY DISEASE, UNSPECIFIED COPD TYPE (HCC): ICD-10-CM

## 2021-02-12 DIAGNOSIS — I10 ESSENTIAL HYPERTENSION: Primary | ICD-10-CM

## 2021-02-12 PROCEDURE — 3017F COLORECTAL CA SCREEN DOC REV: CPT | Performed by: INTERNAL MEDICINE

## 2021-02-12 PROCEDURE — 1090F PRES/ABSN URINE INCON ASSESS: CPT | Performed by: INTERNAL MEDICINE

## 2021-02-12 PROCEDURE — G8400 PT W/DXA NO RESULTS DOC: HCPCS | Performed by: INTERNAL MEDICINE

## 2021-02-12 PROCEDURE — G8754 DIAS BP LESS 90: HCPCS | Performed by: INTERNAL MEDICINE

## 2021-02-12 PROCEDURE — 93000 ELECTROCARDIOGRAM COMPLETE: CPT | Performed by: INTERNAL MEDICINE

## 2021-02-12 PROCEDURE — G8536 NO DOC ELDER MAL SCRN: HCPCS | Performed by: INTERNAL MEDICINE

## 2021-02-12 PROCEDURE — G8417 CALC BMI ABV UP PARAM F/U: HCPCS | Performed by: INTERNAL MEDICINE

## 2021-02-12 PROCEDURE — G8510 SCR DEP NEG, NO PLAN REQD: HCPCS | Performed by: INTERNAL MEDICINE

## 2021-02-12 PROCEDURE — G8427 DOCREV CUR MEDS BY ELIG CLIN: HCPCS | Performed by: INTERNAL MEDICINE

## 2021-02-12 PROCEDURE — 1101F PT FALLS ASSESS-DOCD LE1/YR: CPT | Performed by: INTERNAL MEDICINE

## 2021-02-12 PROCEDURE — G8753 SYS BP > OR = 140: HCPCS | Performed by: INTERNAL MEDICINE

## 2021-02-12 PROCEDURE — 99214 OFFICE O/P EST MOD 30 MIN: CPT | Performed by: INTERNAL MEDICINE

## 2021-02-12 RX ORDER — BENAZEPRIL HYDROCHLORIDE 10 MG/1
10 TABLET ORAL DAILY
COMMUNITY

## 2021-02-12 RX ORDER — BISMUTH SUBSALICYLATE 262 MG
1 TABLET,CHEWABLE ORAL DAILY
COMMUNITY

## 2021-02-12 NOTE — PROGRESS NOTES
HISTORY OF PRESENT ILLNESS  Pilar Gleason is a 77 y.o. female. Shortness of Breath  Associated symptoms include leg swelling. Pertinent negatives include no fever, no cough, no wheezing, no PND, no orthopnea, no vomiting, no rash and no claudication. Follow-up  Associated symptoms include shortness of breath. Patient presents for an overdue follow-up office visit. She does not have a prior history of CAD. She has a history of hypertension, severe COPD, now oxygen dependent and severe secondary pulmonary hypertension managed with vasodilators by her pulmonologist.    The patient underwent a repeat echocardiogram in August 2019 which showed preserved LV function, EF 65 to 70%, mild concentric left hypertrophy, right ventricular and right atrial enlargement, with moderately elevated PA pressure of 57 mmHg. This was slightly lower compared to her previous study. She was last seen in our office over 2 years ago. She remains on 6 L of home oxygen 24 hours a day and continues to take all of her vasodilators. She feels her activity tolerance is better this year than it was a year ago. She does have shortness of breath with physical activity. However, she states this is better than it was several years ago. Past Medical History:   Diagnosis Date    LALO [facility]acute kidney injury) ([facility] [address] 12/27/2015    ARF (acute respiratory failure) ([facility] [address] 12/27/2015    Asthma     Chronic lung disease     COPD (chronic obstructive pulmonary disease) (HCC)     Dependence on continuous supplemental oxygen     Diastolic heart failure (HCC)     Echocardiogram abnormal 12/29/2015    Hyperdynamic. EF 75%. No WMA. Gr 1 DDfx. Mild RVE. RVSP 60-70 mmHg (prev 120 mmHg on 10/1/15). Mild LAE. Mod-severe ELISHA. Mild-mod TR.  Gastric wall thickening 2009    Chronic Proximal Gastric Wall Calcifications.      Hyperlipidemia     Hypertension     ANNELIESE (obstructive sleep apnea)     Pulmonary arterial hypertension ([address]  Pulmonary hypertension (HCC)     RVSP 120 mm Hg, cor pulmonale (echo Oct 2015)    Right upper lobe pneumonia 12/27/2015    Cavitary RUL Pneumonia    TB lung, latent 1986    Diagnosed/Treated.  Thallium stress test 11/12/2002    No prior infarction. Minimal to mild apical ischemia vs apical thinning. RVE.  Tuberculosis      Current Outpatient Medications   Medication Sig Dispense Refill    benazepriL (LOTENSIN) 10 mg tablet Take 10 mg by mouth daily. Pt taking 30 mg daily.  multivitamin (ONE A DAY) tablet Take 1 Tab by mouth daily.  Ambrisentan 5 mg tablet TAKE 1 TABLET BY MOUTH EVERY DAY 30 Tab 11    tiotropium bromide (SPIRIVA RESPIMAT) 2.5 mcg/actuation inhaler Take 2 Puffs by inhalation daily. 3 Inhaler 1    budesonide-formoterol (SYMBICORT) 80-4.5 mcg/actuation HFAA Take 1 Puff by inhalation two (2) times a day. 3 Inhaler 1    albuterol (PROVENTIL HFA, VENTOLIN HFA, PROAIR HFA) 90 mcg/actuation inhaler Take 2 Puffs by inhalation every four (4) hours as needed for Wheezing or Shortness of Breath. 3 Inhaler 1    sildenafil, antihypertensive, (REVATIO) 20 mg tablet TAKE 1 TABLET BY MOUTH THREE TIMES DAILY 90 Tab 0    OXYGEN-AIR DELIVERY SYSTEMS 6 L by Does Not Apply route. Uses 4 l when out   Indications: DME: [facility]      furosemide (LASIX) 40 mg tablet Take 1 Tab by mouth daily. 30 Tab 3    pravastatin (PRAVACHOL) 20 mg tablet Take 20 mg by mouth nightly.  fluticasone (FLONASE) 50 mcg/actuation nasal spray 2 Sprays by Both Nostrils route daily.  benazepril (LOTENSIN) 20 mg tablet Take 20 mg by mouth daily. Pt taking 30 mg daily.  albuterol-ipratropium (DUO-NEB) 2.5 mg-0.5 mg/3 ml nebu 3 mL by Nebulization route every six (6) hours as needed. Diag.  Code: J44.9 360 Nebule 3     No Known Allergies     Social History     Tobacco Use    Smoking status: Former Smoker     Packs/day: 2.00     Years: 20.00     Pack years: 40.00     Types: Cigarettes     Quit date: 1990     Years since quittin.1    Smokeless tobacco: Never Used   Substance Use Topics    Alcohol use: Yes     Comment: social     Drug use: No         Review of Systems   Constitutional: Negative for chills, fever and weight loss. HENT: Negative for nosebleeds. Eyes: Negative for blurred vision and double vision. Respiratory: Positive for shortness of breath. Negative for cough and wheezing. Cardiovascular: Positive for leg swelling. Negative for palpitations, orthopnea, claudication and PND. Gastrointestinal: Negative for heartburn, nausea and vomiting. Genitourinary: Negative for dysuria and hematuria. Musculoskeletal: Negative for falls and myalgias. Skin: Negative for rash. Neurological: Negative for dizziness and focal weakness. Endo/Heme/Allergies: Does not bruise/bleed easily. Psychiatric/Behavioral: Negative for substance abuse. Visit Vitals  BP (!) 146/86 (BP 1 Location: Left lower arm, BP Patient Position: Sitting, BP Cuff Size: Small adult)   Pulse 79   Ht 5' 5\" (1.651 m)   Wt 88 kg (194 lb)   SpO2 100%   BMI 32.28 kg/m²       Physical Exam   Constitutional: She is oriented to person, place, and time. She appears well-developed and well-nourished. HENT:   Head: Normocephalic and atraumatic. Eyes: Conjunctivae are normal.   Neck: Neck supple. No JVD present. Carotid bruit is not present. Cardiovascular: Normal rate, regular rhythm, S1 normal, S2 normal and normal pulses. Exam reveals no gallop. No murmur heard. Pulmonary/Chest: Effort normal and breath sounds normal. She has no wheezes. She has no rales. Abdominal: Soft. Bowel sounds are normal. There is no abdominal tenderness. Musculoskeletal:         General: Edema present. Comments: Trace   Neurological: She is alert and oriented to person, place, and time. Skin: Skin is warm and dry.      EKG: Normal sinus rhythm, normal axis, normal QTc interval, nonspecific T-wave flattening, poor R-wave progression. No change compared to the previous EKG. ASSESSMENT and PLAN    Hypertension. Patient's blood pressure appears reasonably well controlled on her current medical regimen. All of which I would continue. Severe secondary pulmonary hypertension. This is followed closely by pulmonology. She is managed with vasodilators and chronic oxygen therapy. Patient feels her breathing has slowly improved over the past 2 years. A repeat echocardiogram showed improvement in her PA pressures now at 57 mmHg. She has preserved LV function, EF 65 to 70% with mild concentric LVH. Lower extremity swelling. This is better than it was a few years ago as well. She remains on furosemide 40 mg daily which I will continue. Severe COPD. Patient remains on 6 L of oxygen therapy 24 hours a day.     Follow-up in 12 months, sooner if needed

## 2021-02-12 NOTE — PROGRESS NOTES
Staci Diaz presents today for   Chief Complaint   Patient presents with    Follow-up     last seen in 2018, overdue follow up       Staci Diaz preferred language for health care discussion is english/other. Is someone accompanying this pt? yes    Is the patient using any DME equipment during [address]? walker    Depression Screening:  3 most recent PHQ Screens 2/12/2021   PHQ Not Done -   Little interest or pleasure in doing things Not at all   Feeling down, depressed, irritable, or hopeless Not at all   Total Score PHQ 2 0       Learning Assessment:  Learning Assessment 2/12/2021   PRIMARY LEARNER Patient   PRIMARY LANGUAGE ENGLISH   LEARNER PREFERENCE PRIMARY DEMONSTRATION   ANSWERED BY patient   RELATIONSHIP SELF       Abuse Screening:  Abuse Screening Questionnaire 2/12/2021   Do you ever feel afraid of your partner? N   Are you in a relationship with someone who physically or mentally threatens you? N   Is it safe for you to go home? Y       Fall Risk  Fall Risk Assessment, last 12 mths 2/12/2021   Able to walk? Yes   Fall in past 12 months? 0   Do you feel unsteady? 0   Are you worried about falling 0       Pt currently taking Anticoagulant therapy? no    Coordination of Care:  1. Have you been to the ER, urgent care clinic since your last visit? Hospitalized since your last visit? no    2. Have you seen or consulted any other health care providers outside of the [address] [place] [address] since your last visit? Include any pap smears or colon screening.  no

## 2021-02-24 NOTE — TELEPHONE ENCOUNTER
Pt's daughter called requesting refills of symbicort, spiriva, [facility] She also stated that [facility] needed a new order for an optimizer.

## 2021-02-25 ENCOUNTER — TELEPHONE (OUTPATIENT)
Dept: PULMONOLOGY | Age: 66
End: 2021-02-25

## 2021-02-25 RX ORDER — TIOTROPIUM BROMIDE INHALATION SPRAY 3.12 UG/1
SPRAY, METERED RESPIRATORY (INHALATION)
Qty: 1 INHALER | Refills: 0 | Status: SHIPPED | OUTPATIENT
Start: 2021-02-25 | End: 2021-03-25 | Stop reason: DRUGHIGH

## 2021-02-25 RX ORDER — ALBUTEROL SULFATE 90 UG/1
2 AEROSOL, METERED RESPIRATORY (INHALATION)
Qty: 1 INHALER | Refills: 0 | Status: SHIPPED | OUTPATIENT
Start: 2021-02-25

## 2021-02-25 RX ORDER — BUDESONIDE AND FORMOTEROL FUMARATE DIHYDRATE 80; 4.5 UG/1; UG/1
1 AEROSOL RESPIRATORY (INHALATION) 2 TIMES DAILY
Qty: 1 INHALER | Refills: 0 | Status: SHIPPED | OUTPATIENT
Start: 2021-02-25 | End: 2021-03-25 | Stop reason: DRUGHIGH

## 2021-02-25 NOTE — TELEPHONE ENCOUNTER
Jazmyne from [facility]([phone]). She needs to clarify the directions for Symbicort. Please call back.

## 2021-03-25 ENCOUNTER — OFFICE VISIT (OUTPATIENT)
Dept: PULMONOLOGY | Age: 66
End: 2021-03-25
Payer: MEDICARE

## 2021-03-25 VITALS
BODY MASS INDEX: 32.49 KG/M2 | HEART RATE: 86 BPM | WEIGHT: 195 LBS | RESPIRATION RATE: 23 BRPM | OXYGEN SATURATION: 84 % | SYSTOLIC BLOOD PRESSURE: 130 MMHG | TEMPERATURE: 98.5 F | DIASTOLIC BLOOD PRESSURE: 70 MMHG | HEIGHT: 65 IN

## 2021-03-25 DIAGNOSIS — J44.9 COPD, SEVERE (HCC): ICD-10-CM

## 2021-03-25 DIAGNOSIS — E66.9 OBESITY (BMI 30.0-34.9): ICD-10-CM

## 2021-03-25 DIAGNOSIS — R06.02 SOB (SHORTNESS OF BREATH): Primary | ICD-10-CM

## 2021-03-25 DIAGNOSIS — F10.10 ETOH ABUSE: ICD-10-CM

## 2021-03-25 DIAGNOSIS — R91.1 LUNG NODULE: ICD-10-CM

## 2021-03-25 DIAGNOSIS — R09.02 HYPOXEMIA REQUIRING SUPPLEMENTAL OXYGEN: ICD-10-CM

## 2021-03-25 DIAGNOSIS — Z99.81 HYPOXEMIA REQUIRING SUPPLEMENTAL OXYGEN: ICD-10-CM

## 2021-03-25 DIAGNOSIS — I27.20 PULMONARY HTN (HCC): Primary | ICD-10-CM

## 2021-03-25 DIAGNOSIS — I27.20 PULMONARY HTN (HCC): ICD-10-CM

## 2021-03-25 PROCEDURE — G8536 NO DOC ELDER MAL SCRN: HCPCS | Performed by: INTERNAL MEDICINE

## 2021-03-25 PROCEDURE — G8510 SCR DEP NEG, NO PLAN REQD: HCPCS | Performed by: INTERNAL MEDICINE

## 2021-03-25 PROCEDURE — 1101F PT FALLS ASSESS-DOCD LE1/YR: CPT | Performed by: INTERNAL MEDICINE

## 2021-03-25 PROCEDURE — 1090F PRES/ABSN URINE INCON ASSESS: CPT | Performed by: INTERNAL MEDICINE

## 2021-03-25 PROCEDURE — G8427 DOCREV CUR MEDS BY ELIG CLIN: HCPCS | Performed by: INTERNAL MEDICINE

## 2021-03-25 PROCEDURE — 94618 PULMONARY STRESS TESTING: CPT | Performed by: INTERNAL MEDICINE

## 2021-03-25 PROCEDURE — G8400 PT W/DXA NO RESULTS DOC: HCPCS | Performed by: INTERNAL MEDICINE

## 2021-03-25 PROCEDURE — G8752 SYS BP LESS 140: HCPCS | Performed by: INTERNAL MEDICINE

## 2021-03-25 PROCEDURE — 99214 OFFICE O/P EST MOD 30 MIN: CPT | Performed by: INTERNAL MEDICINE

## 2021-03-25 PROCEDURE — 3017F COLORECTAL CA SCREEN DOC REV: CPT | Performed by: INTERNAL MEDICINE

## 2021-03-25 PROCEDURE — G8754 DIAS BP LESS 90: HCPCS | Performed by: INTERNAL MEDICINE

## 2021-03-25 PROCEDURE — G8417 CALC BMI ABV UP PARAM F/U: HCPCS | Performed by: INTERNAL MEDICINE

## 2021-03-25 RX ORDER — FLUTICASONE FUROATE, UMECLIDINIUM BROMIDE AND VILANTEROL TRIFENATATE 100; 62.5; 25 UG/1; UG/1; UG/1
1 POWDER RESPIRATORY (INHALATION) DAILY
Qty: 1 INHALER | Refills: 0 | Status: SHIPPED | COMMUNITY
Start: 2021-03-25 | End: 2022-01-25 | Stop reason: SDUPTHER

## 2021-03-25 RX ORDER — FLUTICASONE FUROATE, UMECLIDINIUM BROMIDE AND VILANTEROL TRIFENATATE 100; 62.5; 25 UG/1; UG/1; UG/1
1 POWDER RESPIRATORY (INHALATION) DAILY
Qty: 3 INHALER | Refills: 3 | Status: SHIPPED | COMMUNITY
Start: 2021-03-25 | End: 2021-06-29 | Stop reason: SDUPTHER

## 2021-03-25 NOTE — PROGRESS NOTES
HISTORY OF PRESENT ILLNESS  Flo Wilson is a 77 y.o. female. Severe COPD FEV1 37% and Pulmonary HTN, last PASP 57 mm Hg. Patient is a 59 y. o.AA female with severe COPD and Pulmonary HTN with hypoxemia needing home oxygen with low O2 sats at baseline. She was followed by Dr. Trini Fernandez who started her on Letairis 5 mg daily. She was admitted to [facility] end of Dec 2015 with respiratory failure, worsened hypoxemia, cough, blood streaked sputum. CT chest and CXR showing new RT upper lobe pneumonia and cavitation. Due to past hx of +ve PPD with INH treatment, she was in respiratory isolation. She needed to be in icu with need for BIPAP and high flow oxygen. She was taken off respiratory isolation after 3 sputum samples with negative stain for AFB. She also underwent a bronchoscopy and BAL in the icu on 1/4/16 showing ELSA and needing triple therapy. Pt complains of worsening SOB on mild to moderate exertion and is requesting referral for maintenance pulmonary rehab as she had experienced significant improvement during this before. Denies chest pain or hemoptysis. No fever or chills. Pt notes episodic SOB with wheezing, sometimes triggered by dust, fumes and strong smells, helped with Albuterol rescue inhaler. Pt was evaluated at [facility] for possible lung transplant but was taken off the referral list for noncompliance with visits and for continued ETOH. Pt still admits to daily ETOH use. Review of Systems   Constitutional: Positive for malaise/fatigue (improving). Negative for chills, diaphoresis, fever and weight loss. Weight gain   HENT: Negative for congestion, ear discharge, ear pain, hearing loss, nosebleeds, sinus pain, sore throat and tinnitus. Eyes: Negative for blurred vision, double vision, photophobia, pain, discharge and redness. Respiratory: Positive for cough and shortness of breath. Negative for hemoptysis, sputum production and stridor.  Wheezing: occasional. Cardiovascular: Negative for chest pain, palpitations, orthopnea, claudication and PND. Leg swelling: worsening. Gastrointestinal: Negative for abdominal pain, blood in stool, constipation, diarrhea, heartburn, melena, nausea and vomiting. Genitourinary: Negative for dysuria, flank pain, frequency, hematuria and urgency. Musculoskeletal: Positive for joint pain. Negative for back pain, falls, myalgias and neck pain. Skin: Negative for itching and rash. Neurological: Negative for dizziness, tingling, tremors, sensory change, speech change, focal weakness, seizures, loss of consciousness, weakness and headaches. Endo/Heme/Allergies: Negative for environmental allergies and polydipsia. Does not bruise/bleed easily. Psychiatric/Behavioral: Positive for substance abuse. Negative for depression, hallucinations, memory loss and suicidal ideas. The patient is not nervous/anxious and does not have insomnia. Past Medical History:   Diagnosis Date    LALO (acute kidney injury) ([facility] [address] 12/27/2015    ARF (acute respiratory failure) ([facility] [address] 12/27/2015    Asthma     Chronic lung disease     [facility] obstructive pulmonary disease) ([facility]     Dependence on continuous supplemental oxygen     Diastolic heart failure ([facility])     Echocardiogram abnormal 12/29/2015    Hyperdynamic. EF 75%. No WMA. Gr 1 DDfx. Mild RVE. RVSP 60-70 mmHg (prev 120 mmHg on 10/1/15). Mild LAE. Mod-severe ELISHA. Mild-mod TR.  Gastric wall thickening 2009    Chronic Proximal Gastric Wall Calcifications.  Hyperlipidemia     Hypertension     ANNELIESE (obstructive sleep apnea)     Pulmonary arterial hypertension ([facility])     Pulmonary hypertension ([facility])     RVSP 120 mm Hg, cor pulmonale (echo Oct 2015)    Right upper lobe pneumonia 12/27/2015    Cavitary RUL Pneumonia    TB lung, latent 1986    Diagnosed/Treated.  Thallium stress test 11/12/2002    No prior infarction. Minimal to mild apical ischemia vs apical thinning. RVE.      Tuberculosis      Past Surgical History:   Procedure Laterality Date    BRONCHOSCOPY  01/04/2016    Dr. Tinsley Shall HX DILATION AND CURETTAGE      HX GASTRIC BYPASS      HX HYSTERECTOMY      HX ORTHOPAEDIC      bone spur    HX TONSILLECTOMY      HX TUBAL LIGATION       Current Outpatient Medications on File Prior to Visit   Medication Sig Dispense Refill    albuterol (PROVENTIL HFA, VENTOLIN HFA, PROAIR HFA) 90 mcg/actuation inhaler Take 2 Puffs by inhalation every four (4) hours as needed for Wheezing or Shortness of Breath. 1 Inhaler 0    benazepriL (LOTENSIN) 10 mg tablet Take 10 mg by mouth daily. Pt taking 30 mg daily.  multivitamin (ONE A DAY) tablet Take 1 Tab by mouth daily.  Ambrisentan 5 mg tablet TAKE 1 TABLET BY MOUTH EVERY DAY 30 Tab 11    albuterol-ipratropium (DUO-NEB) 2.5 mg-0.5 mg/3 ml nebu 3 mL by Nebulization route every six (6) hours as needed. Diag. Code: J44.9 360 Nebule 3    OXYGEN-AIR DELIVERY SYSTEMS 6 L by Does Not Apply route. Uses 4 l when out   Indications: DME: [facility]      furosemide (LASIX) 40 mg tablet Take 1 Tab by mouth daily. 30 Tab 3    pravastatin (PRAVACHOL) 20 mg tablet Take 20 mg by mouth nightly.  fluticasone (FLONASE) 50 mcg/actuation nasal spray 2 Sprays by Both Nostrils route daily.  benazepril (LOTENSIN) 20 mg tablet Take 20 mg by mouth daily. Pt taking 30 mg daily.  sildenafil, antihypertensive, (REVATIO) 20 mg tablet TAKE 1 TABLET BY MOUTH THREE TIMES DAILY 90 Tab 0     No current facility-administered medications on file prior to visit.       No Known Allergies    Family History   Problem Relation Age of Onset    Asthma Maternal Aunt     Diabetes Daughter      Social History     Socioeconomic History    Marital status: SINGLE     Spouse name: Not on file    Number of children: Not on file    Years of education: Not on file    Highest education level: Not on file   Occupational History   • Not on file   Social Needs   • Financial resource strain: Not on file   • Food insecurity     Worry: Not on file     Inability: Not on file   • Transportation needs     Medical: Not on file     Non-medical: Not on file   Tobacco Use   • Smoking status: Former Smoker     Packs/day: 2.00     Years: 20.00     Pack years: 40.00     Types: Cigarettes     Quit date: 1990     Years since quittin.2   • Smokeless tobacco: Never Used   Substance and Sexual Activity   • Alcohol use: Yes     Comment: social    • Drug use: No   • Sexual activity: Not on file   Lifestyle   • Physical activity     Days per week: Not on file     Minutes per session: Not on file   • Stress: Not on file   Relationships   • Social connections     Talks on phone: Not on file     Gets together: Not on file     Attends Church service: Not on file     Active member of club or organization: Not on file     Attends meetings of clubs or organizations: Not on file     Relationship status: Not on file   • Intimate partner violence     Fear of current or ex partner: Not on file     Emotionally abused: Not on file     Physically abused: Not on file     Forced sexual activity: Not on file   Other Topics Concern   • Not on file   Social History Narrative   • Not on file     Blood pressure 130/70, pulse 86, temperature 98.5 °F (36.9 °C), temperature source Oral, resp. rate 23, height 5' 5\" (1.651 m), weight 88.5 kg (195 lb), SpO2 (!) 84 %.    Ambulatory oximetry per RT note  Physical Exam   Constitutional: She is oriented to person, place, and time. She appears well-developed and well-nourished. No distress.   Uses a walker, obese   HENT:   Head: Normocephalic.   Nose: Nose normal.   Mouth/Throat: No oropharyngeal exudate.   Upper dentures, carious teeth   Eyes: Pupils are equal, round, and reactive to light. Conjunctivae and EOM are normal. Right eye exhibits no discharge. Left eye exhibits no discharge. No scleral icterus.   Neck: No JVD  present. No tracheal deviation present. No thyromegaly present. Cardiovascular: Normal rate, regular rhythm, normal heart sounds and intact distal pulses. Exam reveals no gallop and no friction rub. No murmur heard. Pulmonary/Chest: Effort normal. No stridor. No respiratory distress. She has no wheezes. She has no rales. She exhibits no tenderness. Diminished breath sounds   Abdominal: Soft. She exhibits no mass. There is no abdominal tenderness. There is no rebound. Musculoskeletal:         General: No tenderness or deformity. Edema: 1+   Lymphadenopathy:     She has no cervical adenopathy. Neurological: She is alert and oriented to person, place, and time. Coordination normal.   Skin: Skin is warm and dry. No rash noted. She is not diaphoretic. No erythema. No pallor. Psychiatric: She has a normal mood and affect. Her behavior is normal. Judgment and thought content normal.     CT Results (most recent):  Results from Hospital Encounter encounter on 08/15/19   CT CHEST WO CONT    Narrative EXAM: CT CHEST WO CONT    CLINICAL HISTORY/INDICATIONS: lung nodule    TECHNIQUE:  Contiguous  5 mm axial images were obtained from thoracic inlet  through the adrenal glands. CT scans at this facility are performed using dose optimization technique as  appropriate with performed exam, to include automated exposure control,  adjustment of mA and/or kV according to patient's size (including appropriate  matching for site-specific examinations), or use of iterative reconstruction  technique. COMPARISON: CT scan chest 12/18/2018 and 9/29/2017    FINDINGS:  Mediastinum:  No adenopathy. Mild ASCVD. Moderate CAD. Lungs:  Advanced emphysematous change of the lungs. Chronic atelectasis with air  bronchograms mild involves medial and inferior right lobe, unchanged. There is  scarring present in the medial right upper lobe. Bullae involving the right apex  and left lower lobe.  A 8x6 mm slightly nodular density of the inferior right  middle lobe adjacent to area of chronic atelectasis and scarring is again  identified unchanged since December 2018. Appearance is more suggestive of  chronic focal atelectasis. 2 mm nodule posterior right upper lobe stable and unchanged. Chest wall/ soft tissues:  Unremarkable    Upper abdomen:  Small hiatal hernia. Previous gastric surgery. Cholelithiasis. Diverticulosis of the colon. Bones:  No acute findings. Mild degenerative change of the thoracic spine. .          Impression IMPRESSION[de-identified]    1.  Severe emphysematous changes of the lungs. 2. Pulmonary nodules unchanged since prior exam of December 2018. Consider  continued follow-up in one year. 3.. Chronic atelectasis and scarring of the medial right upper lobe and medial  right middle lobe. 4. Small hiatal hernia. Previous gastric surgery. Cholelithiasis. Diverticulosis  of the colon. Thank you for this referral.     08/15/19   ECHO ADULT FOLLOW-UP OR LIMITED 08/15/2019 8/15/2019    Narrative · Left Ventricle: Normal systolic function (ejection fraction normal). Small left ventricle. Mild concentric hypertrophy. Estimated left   ventricular ejection fraction is 66 - 70%. No regional wall motion   abnormality noted. Unable to grade but suspect at abnromal left   ventricular diastolic dysfunction. · Right Atrium: Mildly dilated right atrium. · Pulmonary Artery: Moderate pulmonary hypertension. Pulmonary arterial   systolic pressure is 57 mmHg. Signed by: Tabatha Corbin DO         PFT: severe obstruction FEV1 38% with reversible component. DLCO reduced to 31%    ASSESSMENT and PLAN  Encounter Diagnoses   Name Primary?  Pulmonary HTN (HCC) Yes    COPD, severe ([address]     Lung nodule     Obesity (BMI 30.0-34. 9)     Hypoxemia requiring supplemental oxygen     ETOH abuse      Pt with Pulmonary HTN Grp 1 per  history, however pt also with advanced COPD, so likely Grp 3 as well.    Worsening dyspnea may be related to worsening obstruction and hypoxemia, will schedule PFT's. Will switch to Trelegy, sample and instructions given. Schedule chest CT to follow lung nodule. Will continue current Letairis and Sildenafil which seem to have improved right sided pressures    Patient has Chronic respiratory failure consequent of COPD. Bi-level PAP has been tried previously and has proven ineffective at managing this patient's hypercapnia or to stabilize patient. Patient's volume requirements are not met by use of Bi-level PAP devices. Patient requires alarms, backup battery, and portability that is not possible with Bi-level PAP. Patient requires frequent durations of respiratory support and deteriorates quickly in the absence of NIMV. Interruption or failure to provide NIMV would quickly lead to exacerbation of patient's condition, hospital admission and likely harm to the patient. Continued use of NIMV is preferred. RTC 3 months or sooner prn.   Awaiting second COVID vaccine dose  Further intervention pending test results

## 2021-03-25 NOTE — PROGRESS NOTES
Pili Sanchez presents today for   Chief Complaint   Patient presents with    COPD     6MWT 3/25/2021. Echo 8/15/2019. CT 8/15/2019.  Lung Nodule     Pulmonary Hypertension.  O2/Oxygen       Is someone accompanying this pt? No    Is the patient using any DME equipment during OV? Oxygen   -DME [employer] [facility]. Depression Screening:  3 most recent PHQ Screens 3/25/2021   PHQ Not Done -   Little interest or pleasure in doing things Not at all   Feeling down, depressed, irritable, or hopeless Not at all   Total Score PHQ 2 0       Learning Assessment:  Learning Assessment 2/12/2021   PRIMARY LEARNER Patient   PRIMARY LANGUAGE ENGLISH   LEARNER PREFERENCE PRIMARY DEMONSTRATION   ANSWERED BY patient   RELATIONSHIP SELF       Abuse Screening:  Abuse Screening Questionnaire 2/12/2021   Do you ever feel afraid of your partner? N   Are you in a relationship with someone who physically or mentally threatens you? N   Is it safe for you to go home? Y       Fall Risk  Fall Risk Assessment, last 12 mths 2/12/2021   Able to walk? Yes   Fall in past 12 months? 0   Do you feel unsteady? 0   Are you worried about falling 0         Coordination of Care:  1. Have you been to the ER, urgent care clinic since your last visit? Hospitalized since your last visit? No.    2. Have you seen or consulted any other health care providers outside of the [address] [place] [address] since your last visit? Include any pap smears or colon screening.  No.

## 2021-03-25 NOTE — PROGRESS NOTES
Order placed for COVID-19 test, per Verbal Order from Dr. Indy Hawkins on 3/25/2021. Last office visit: 3/25/2021  Follow up Visit: 3/31/2021 (PFT) & 6/29/2021 (F/U)    Provider is aware of last office visit and follow up. No further action requested from provider.

## 2021-03-26 ENCOUNTER — HOSPITAL ENCOUNTER (OUTPATIENT)
Dept: LAB | Age: 66
Discharge: HOME | End: 2021-03-26
Payer: MEDICARE

## 2021-03-26 ENCOUNTER — TELEPHONE (OUTPATIENT)
Dept: PULMONOLOGY | Age: 66
End: 2021-03-26

## 2021-03-26 DIAGNOSIS — J44.9 COPD, SEVERE (HCC): ICD-10-CM

## 2021-03-26 DIAGNOSIS — R91.1 LUNG NODULE: ICD-10-CM

## 2021-03-26 DIAGNOSIS — I27.20 PULMONARY HTN (HCC): ICD-10-CM

## 2021-03-26 DIAGNOSIS — R09.02 HYPOXEMIA REQUIRING SUPPLEMENTAL OXYGEN: ICD-10-CM

## 2021-03-26 DIAGNOSIS — R06.02 SOB (SHORTNESS OF BREATH): ICD-10-CM

## 2021-03-26 DIAGNOSIS — Z99.81 HYPOXEMIA REQUIRING SUPPLEMENTAL OXYGEN: ICD-10-CM

## 2021-03-26 PROCEDURE — U0003 INFECTIOUS AGENT DETECTION BY NUCLEIC ACID (DNA OR RNA); SEVERE ACUTE RESPIRATORY SYNDROME CORONAVIRUS 2 (SARS-COV-2) (CORONAVIRUS DISEASE [COVID-19]), AMPLIFIED PROBE TECHNIQUE, MAKING USE OF HIGH THROUGHPUT TECHNOLOGIES AS DESCRIBED BY CMS-2020-01-R: HCPCS

## 2021-03-26 PROCEDURE — 36415 COLL VENOUS BLD VENIPUNCTURE: CPT

## 2021-03-26 NOTE — TELEPHONE ENCOUNTER
Pt caregiver Greyson Duval called to get patient an oximeter to use at home. Please call [phone]

## 2021-03-26 NOTE — TELEPHONE ENCOUNTER
Spoke with daughter. The patient saw Dr. Stephany Bence yesterday and placed an order for the supplies and replacement of the NIV to [facility].

## 2021-03-27 LAB — SARS-COV-2, COV2NT: NOT DETECTED

## 2021-03-31 ENCOUNTER — OFFICE VISIT (OUTPATIENT)
Dept: PULMONOLOGY | Age: 66
End: 2021-03-31
Payer: MEDICARE

## 2021-03-31 VITALS — TEMPERATURE: 97.9 F | WEIGHT: 194 LBS | BODY MASS INDEX: 32.32 KG/M2 | HEIGHT: 65 IN

## 2021-03-31 DIAGNOSIS — R06.02 SOB (SHORTNESS OF BREATH): Primary | ICD-10-CM

## 2021-03-31 PROCEDURE — 94727 GAS DIL/WSHOT DETER LNG VOL: CPT | Performed by: INTERNAL MEDICINE

## 2021-03-31 PROCEDURE — 94060 EVALUATION OF WHEEZING: CPT | Performed by: INTERNAL MEDICINE

## 2021-03-31 PROCEDURE — 94729 DIFFUSING CAPACITY: CPT | Performed by: INTERNAL MEDICINE

## 2021-04-02 ENCOUNTER — TELEPHONE (OUTPATIENT)
Dept: PULMONOLOGY | Age: 66
End: 2021-04-02

## 2021-04-02 NOTE — TELEPHONE ENCOUNTER
Pt calling because Fabiola stated they still don't have order for an oximizer for her and also for supplies. Please call pt and let her know status of this at [phone].

## 2021-04-19 ENCOUNTER — TELEPHONE (OUTPATIENT)
Dept: PULMONOLOGY | Age: 66
End: 2021-04-19

## 2021-04-19 DIAGNOSIS — I27.20 PULMONARY HYPERTENSION (HCC): ICD-10-CM

## 2021-04-19 NOTE — TELEPHONE ENCOUNTER
[facility] states someone sent something today denying the patients medication. I have sent to Dr. Melva Dubose as refill for tomorrow when she is in the office

## 2021-04-20 ENCOUNTER — HOSPITAL ENCOUNTER (OUTPATIENT)
Dept: CT IMAGING | Age: 66
Discharge: HOME | End: 2021-04-20
Attending: INTERNAL MEDICINE
Payer: MEDICARE

## 2021-04-20 DIAGNOSIS — R91.1 LUNG NODULE: ICD-10-CM

## 2021-04-20 PROCEDURE — 71250 CT THORAX DX C-: CPT

## 2021-04-20 RX ORDER — AMBRISENTAN 5 MG/1
TABLET, FILM COATED ORAL
Qty: 30 TAB | Refills: 11 | Status: SHIPPED | COMMUNITY
Start: 2021-04-20 | End: 2022-04-25

## 2021-04-23 NOTE — TELEPHONE ENCOUNTER
Pt's daughter called([phone]). Pt needs new script for Ambrisentan 5mg sent to [address] [place] [address] [place] [phone].

## 2021-05-20 ENCOUNTER — TELEPHONE (OUTPATIENT)
Dept: PULMONOLOGY | Age: 66
End: 2021-05-20

## 2021-05-20 NOTE — TELEPHONE ENCOUNTER
Per Fabiola, they have tried contacting pt for vent setup, as soon as pt resolves her co pay. They have left messages with no answer.

## 2021-06-02 ENCOUNTER — TELEPHONE (OUTPATIENT)
Dept: PULMONOLOGY | Age: 66
End: 2021-06-02

## 2021-06-02 NOTE — TELEPHONE ENCOUNTER
Pt daughter states [facility] advised her that Ambrisentan is needing a prior auth. Please advise [phone].

## 2021-06-29 ENCOUNTER — OFFICE VISIT (OUTPATIENT)
Dept: PULMONOLOGY | Age: 66
End: 2021-06-29
Payer: MEDICARE

## 2021-06-29 VITALS
HEIGHT: 65 IN | SYSTOLIC BLOOD PRESSURE: 142 MMHG | BODY MASS INDEX: 32.12 KG/M2 | HEART RATE: 110 BPM | TEMPERATURE: 97.4 F | WEIGHT: 192.8 LBS | RESPIRATION RATE: 18 BRPM | DIASTOLIC BLOOD PRESSURE: 65 MMHG | OXYGEN SATURATION: 97 %

## 2021-06-29 DIAGNOSIS — E66.9 OBESITY (BMI 30.0-34.9): ICD-10-CM

## 2021-06-29 DIAGNOSIS — R09.02 HYPOXEMIA REQUIRING SUPPLEMENTAL OXYGEN: ICD-10-CM

## 2021-06-29 DIAGNOSIS — I27.20 PULMONARY HYPERTENSION (HCC): ICD-10-CM

## 2021-06-29 DIAGNOSIS — Z99.81 HYPOXEMIA REQUIRING SUPPLEMENTAL OXYGEN: ICD-10-CM

## 2021-06-29 DIAGNOSIS — J44.9 COPD, SEVERE (HCC): Primary | ICD-10-CM

## 2021-06-29 PROBLEM — Z20.1 TUBERCULOSIS EXPOSURE: Status: ACTIVE | Noted: 2018-05-15

## 2021-06-29 PROBLEM — J96.12 CHRONIC HYPERCAPNIC RESPIRATORY FAILURE (HCC): Status: ACTIVE | Noted: 2018-05-16

## 2021-06-29 PROBLEM — Z01.818 PRE-TRANSPLANT EVALUATION FOR LUNG TRANSPLANT: Status: ACTIVE | Noted: 2021-06-29

## 2021-06-29 PROBLEM — A31.0 MAI (MYCOBACTERIUM AVIUM-INTRACELLULARE) (HCC): Status: ACTIVE | Noted: 2018-05-15

## 2021-06-29 PROCEDURE — G8536 NO DOC ELDER MAL SCRN: HCPCS | Performed by: INTERNAL MEDICINE

## 2021-06-29 PROCEDURE — G8400 PT W/DXA NO RESULTS DOC: HCPCS | Performed by: INTERNAL MEDICINE

## 2021-06-29 PROCEDURE — G8427 DOCREV CUR MEDS BY ELIG CLIN: HCPCS | Performed by: INTERNAL MEDICINE

## 2021-06-29 PROCEDURE — G8510 SCR DEP NEG, NO PLAN REQD: HCPCS | Performed by: INTERNAL MEDICINE

## 2021-06-29 PROCEDURE — 3017F COLORECTAL CA SCREEN DOC REV: CPT | Performed by: INTERNAL MEDICINE

## 2021-06-29 PROCEDURE — G8417 CALC BMI ABV UP PARAM F/U: HCPCS | Performed by: INTERNAL MEDICINE

## 2021-06-29 PROCEDURE — G8753 SYS BP > OR = 140: HCPCS | Performed by: INTERNAL MEDICINE

## 2021-06-29 PROCEDURE — 99214 OFFICE O/P EST MOD 30 MIN: CPT | Performed by: INTERNAL MEDICINE

## 2021-06-29 PROCEDURE — 1101F PT FALLS ASSESS-DOCD LE1/YR: CPT | Performed by: INTERNAL MEDICINE

## 2021-06-29 PROCEDURE — 1090F PRES/ABSN URINE INCON ASSESS: CPT | Performed by: INTERNAL MEDICINE

## 2021-06-29 PROCEDURE — G8754 DIAS BP LESS 90: HCPCS | Performed by: INTERNAL MEDICINE

## 2021-06-29 RX ORDER — FLUTICASONE FUROATE, UMECLIDINIUM BROMIDE AND VILANTEROL TRIFENATATE 100; 62.5; 25 UG/1; UG/1; UG/1
1 POWDER RESPIRATORY (INHALATION) DAILY
Qty: 3 INHALER | Refills: 3 | Status: SHIPPED | COMMUNITY
Start: 2021-06-29 | End: 2022-02-03

## 2021-06-29 RX ORDER — BUDESONIDE AND FORMOTEROL FUMARATE DIHYDRATE 80; 4.5 UG/1; UG/1
2 AEROSOL RESPIRATORY (INHALATION) 2 TIMES DAILY
COMMUNITY
End: 2021-06-29

## 2021-06-29 RX ORDER — ASPIRIN 325 MG
1 TABLET, DELAYED RELEASE (ENTERIC COATED) ORAL
COMMUNITY
Start: 2022-01-25 | End: 2022-01-25

## 2021-06-29 NOTE — PROGRESS NOTES
Maria Luisa Gonzalez presents today for   Chief Complaint   Patient presents with    COPD     CT 4/20/2021.  Lung Nodule    O2/Oxygen       Is someone accompanying this pt? No    Is the patient using any DME equipment during OV? Oxygen/ BiPAP    -[employer]. Depression Screening:  3 most recent PHQ Screens 6/29/2021   PHQ Not Done -   Little interest or pleasure in doing things Not at all   Feeling down, depressed, irritable, or hopeless Not at all   Total Score PHQ 2 0       Learning Assessment:  Learning Assessment 2/12/2021   PRIMARY LEARNER Patient   PRIMARY LANGUAGE ENGLISH   LEARNER PREFERENCE PRIMARY DEMONSTRATION   ANSWERED BY patient   RELATIONSHIP SELF       Abuse Screening:  Abuse Screening Questionnaire 2/12/2021   Do you ever feel afraid of your partner? N   Are you in a relationship with someone who physically or mentally threatens you? N   Is it safe for you to go home? Y       Fall Risk  Fall Risk Assessment, last 12 mths 6/29/2021   Able to walk? Yes   Fall in past 12 months? 0   Do you feel unsteady? 0   Are you worried about falling 0         Coordination of Care:  1. Have you been to the ER, urgent care clinic since your last visit? Hospitalized since your last visit? No    2. Have you seen or consulted any other health care providers outside of the [address] [place] [address] since your last visit? Include any pap smears or colon screening.  No.

## 2021-06-29 NOTE — PROGRESS NOTES
HISTORY OF PRESENT ILLNESS  Alpesh Kinney is a 74 y.o. female. Severe COPD FEV1 45% and Pulmonary HTN, last PASP 57 mm Hg. Patient is a 59 y. o.AA female with severe COPD and Pulmonary HTN with hypoxemia needing home oxygen with low O2 sats at baseline. She was followed by Dr. Monet Jhaveri who started her on Letairis 5 mg daily. She is currently on Sildenafil. She was admitted to [facility] end of Dec 2015 with respiratory failure, worsened hypoxemia, cough, blood streaked sputum. CT chest and CXR showing new RT upper lobe pneumonia and cavitation and ended up in ICU on BIPAP. She also underwent a bronchoscopy and BAL in the icu on 1/4/16 showing ELSA and needing triple therapy. Pt is compliant with supplemental O2 which she now uses continuously. Pt was switched from Symbicort to Trelegy on the last visit with significant improvement in baseline dyspnea and exercise tolerance. Pt also recalls improvement in symptoms after completing pulmonary rehab but did not proceed with maintenance due to the pandemic. Pt was evaluated at [facility] for possible lung transplant but was taken off the referral list for noncompliance with visits and for continued ETOH. Pt still admits to daily ETOH use. Review of Systems   Constitutional: Positive for malaise/fatigue (improving). Negative for chills, diaphoresis, fever and weight loss. Weight gain   HENT: Negative for congestion, ear discharge, ear pain, hearing loss, nosebleeds, sinus pain, sore throat and tinnitus. Eyes: Negative for blurred vision, double vision, photophobia, pain, discharge and redness. Respiratory: Positive for cough. Negative for hemoptysis, sputum production and stridor. Shortness of breath: improving. Wheezing: occasional.    Cardiovascular: Negative for palpitations, orthopnea, claudication and PND. Leg swelling: intermittent.    Gastrointestinal: Negative for abdominal pain, blood in stool, constipation, diarrhea, heartburn, melena, nausea and vomiting. Genitourinary: Negative for dysuria, flank pain, frequency, hematuria and urgency. Musculoskeletal: Positive for joint pain. Negative for back pain, falls, myalgias and neck pain. Skin: Negative for itching and rash. Neurological: Negative for dizziness, tingling, tremors, sensory change, speech change, focal weakness, seizures, loss of consciousness, weakness and headaches. Endo/Heme/Allergies: Negative for environmental allergies and polydipsia. Does not bruise/bleed easily. Psychiatric/Behavioral: Positive for substance abuse. Negative for depression, hallucinations, memory loss and suicidal ideas. The patient is not nervous/anxious and does not have insomnia. Past Medical History:   Diagnosis Date    LALO (acute kidney injury) ([facility] 12/27/2015    ARF (acute respiratory failure) ([facility] 12/27/2015    Asthma     Chronic lung disease     [facility]     Dependence on continuous supplemental oxygen     Diastolic heart failure ([facility])     Echocardiogram abnormal 12/29/2015    Hyperdynamic. EF 75%. No WMA. Gr 1 DDfx. Mild RVE. RVSP 60-70 mmHg (prev 120 mmHg on 10/1/15). Mild LAE. Mod-severe ELISHA. Mild-mod TR.  Gastric wall thickening 2009    Chronic Proximal Gastric Wall Calcifications.  Hyperlipidemia     Hypertension     ANNELIESE (obstructive sleep apnea)     Pulmonary arterial hypertension ([facility]     Pulmonary hypertension ([facility]     RVSP 120 mm Hg, cor pulmonale (echo Oct 2015)    Right upper lobe pneumonia 12/27/2015    Cavitary RUL Pneumonia    TB lung, latent 1986    Diagnosed/Treated.  Thallium stress test 11/12/2002    No prior infarction. Minimal to mild apical ischemia vs apical thinning. RVE.       Tuberculosis      Past Surgical History:   Procedure Laterality Date    BRONCHOSCOPY  01/04/2016    Dr. Esther Cota HX GASTRIC BYPASS      HX HYSTERECTOMY      HX ORTHOPAEDIC      bone spur    HX TONSILLECTOMY      HX TUBAL LIGATION       Current Outpatient Medications on File Prior to Visit   Medication Sig Dispense Refill    cholecalciferol (VITAMIN D3) (50,000 UNITS /1250 MCG) capsule Take 1 Capsule by mouth.  Ambrisentan 5 mg tablet TAKE 1 TABLET BY MOUTH EVERY DAY 30 Tab 11    fluticasone-umeclidinium-vilanterol (Trelegy Ellipta) 100-62.5-25 mcg inhaler Take 1 Puff by inhalation daily. 1 Inhaler 0    fluticasone-umeclidinium-vilanterol (Trelegy Ellipta) 100-62.5-25 mcg inhaler Take 1 Puff by inhalation daily. 3 Inhaler 3    albuterol (PROVENTIL HFA, VENTOLIN HFA, PROAIR HFA) 90 mcg/actuation inhaler Take 2 Puffs by inhalation every four (4) hours as needed for Wheezing or Shortness of Breath. 1 Inhaler 0    benazepriL (LOTENSIN) 10 mg tablet Take 10 mg by mouth daily. Pt taking 30 mg daily.  multivitamin (ONE A DAY) tablet Take 1 Tab by mouth daily.  sildenafil, antihypertensive, (REVATIO) 20 mg tablet TAKE 1 TABLET BY MOUTH THREE TIMES DAILY 90 Tab 0    albuterol-ipratropium (DUO-NEB) 2.5 mg-0.5 mg/3 ml nebu 3 mL by Nebulization route every six (6) hours as needed. Diag. Code: J44.9 360 Nebule 3    OXYGEN-AIR DELIVERY SYSTEMS 6 L by Does Not Apply route. Uses 4 l when out   Indications: DME: [facility]      furosemide (LASIX) 40 mg tablet Take 1 Tab by mouth daily. 30 Tab 3    pravastatin (PRAVACHOL) 20 mg tablet Take 20 mg by mouth nightly.  fluticasone (FLONASE) 50 mcg/actuation nasal spray 2 Sprays by Both Nostrils route daily.  benazepril (LOTENSIN) 20 mg tablet Take 20 mg by mouth daily. Pt taking 30 mg daily.  budesonide-formoteroL (SYMBICORT) 80-4.5 mcg/actuation HFAA Take 2 Puffs by inhalation two (2) times a day. (Patient not taking: Reported on 6/29/2021)       No current facility-administered medications on file prior to visit.      No Known Allergies    Family History   Problem Relation Age of Onset    Asthma Maternal Aunt     Diabetes Daughter      Social History     Socioeconomic History    Marital status: SINGLE     Spouse name: Not on file    Number of children: Not on file    Years of education: Not on file    Highest education level: Not on file   Occupational History    Not on file   Tobacco Use    Smoking status: Former Smoker     Packs/day: 2.00     Years: 20.00     Pack years: 40.00     Types: Cigarettes     Quit date: 1990     Years since quittin.5    Smokeless tobacco: Never Used   Substance and Sexual Activity    Alcohol use: Yes     Comment: social     Drug use: No    Sexual activity: Not on file   Other Topics Concern    Not on file   Social History Narrative    Not on file     Social Determinants of Health     Financial Resource Strain:     Difficulty of Paying Living Expenses:    Food Insecurity:     Worried About Running Out of Food in the Last Year:     [address] in the Last Year:    Transportation Needs:     Lack of Transportation (Medical):  Lack of Transportation (Non-Medical):    Physical Activity:     Days of Exercise per Week:     Minutes of Exercise per Session:    Stress:     Feeling of Stress :    Social Connections:     Frequency of Communication with Friends and Family:     Frequency of Social Gatherings with Friends and Family:     Attends Buddhism Services:     Active Member of Clubs or Organizations:     Attends Club or Organization Meetings:     Marital Status:    Intimate Partner Violence:     Fear of Current or Ex-Partner:     Emotionally Abused:     Physically Abused:     Sexually Abused:      Blood pressure (!) 142/65, pulse (!) 110, temperature 97.4 °F (36.3 °C), temperature source Skin, resp. rate 18, height 5' 5\" (1.651 m), weight 87.5 kg (192 lb 12.8 oz), SpO2 97 %. Ambulatory oximetry per RT note  Physical Exam  Constitutional:       General: She is not in acute distress. Appearance: She is well-developed. She is not diaphoretic. Comments: Uses a walker, obese   HENT:      Head: Normocephalic. Nose:      Comments: Deferred      Mouth/Throat:      Comments: Deferred   Eyes:      General: No scleral icterus. Right eye: No discharge. Left eye: No discharge. Conjunctiva/sclera: Conjunctivae normal.      Pupils: Pupils are equal, round, and reactive to light. Neck:      Thyroid: No thyromegaly. Vascular: No carotid bruit or JVD. Trachea: No tracheal deviation. Cardiovascular:      Rate and Rhythm: Normal rate and regular rhythm. Heart sounds: Normal heart sounds. No murmur heard. No friction rub. No gallop. Pulmonary:      Effort: Pulmonary effort is normal. No respiratory distress. Breath sounds: No stridor. No wheezing or rales. Chest:      Chest wall: No tenderness. Abdominal:      Palpations: Abdomen is soft. There is no mass. Tenderness: There is no abdominal tenderness. There is no rebound. Musculoskeletal:         General: No tenderness or deformity. Cervical back: No rigidity or tenderness. Lymphadenopathy:      Cervical: No cervical adenopathy. Skin:     General: Skin is warm and dry. Coloration: Skin is not jaundiced or pale. Findings: No bruising, erythema, lesion or rash. Neurological:      General: No focal deficit present. Mental Status: She is alert and oriented to person, place, and time. Coordination: Coordination normal.   Psychiatric:         Behavior: Behavior normal.         Thought Content: Thought content normal.         Judgment: Judgment normal.       CT Results (most recent):  Results from Hospital Encounter encounter on 04/20/21    CT CHEST WO CONT    Narrative  CT CHEST WITHOUT IV CONTRAST      COMPARISON: 15 August 2019. INDICATIONS: Lung nodules. TECHNIQUE: Volumetric data acquisition was performed through the chest with a  multislice scanner.  Reconstructions were created in the axial, coronal, and  sagittal planes. FINDINGS:    Thyroid/Base Of Neck:  Unremarkable  . Lungs: There is severe chronic obstructive lung disease predominantly emphysema with  bullous changes. .  A 2 mm nodule in the right upper lobe laterally on image 34 is unchanged. Bronchiectasis and scarring is evident in the middle lobe and lingula platelike  atelectasis or scar is evident in the right lower lobe. Some reticular scarring  is seen in the medial right upper lobe. These findings are stable. .. Pleural Spaces: There is no pneumothorax or pleural fluid evident. No pleural plaques are seen  Normal in size and number  Lymph Nodes:  Axillae: Lysbeth Carrel Mediastinum / Albertina: Normal in size and number. Mediastinum, Great Vessels And Heart: There is no mediastinal mass. The heart and great vessels appear unchanged. Abdomen Structures Included:  Small granulomas are seen in a relatively small spleen. Previous gastric bypass  looks unremarkable. Gallstones are unchanged. Lysbeth Carrel      Osseous Structures: Unremarkable for age. Impression  Severe chronic obstructive pulmonary disease. .  Scarring is evident in the bases bilaterally unchanged  A tiny right upper lobe nodule is unchanged. .    All CT scans at this facility are performed using dose optimization technique as  appropriate to the performed exam, to include automated exposure control,  adjustment of the mA and/or kV according to patient's size (Including  appropriate matching for site-specific examinations), or use of iterative  reconstruction technique. 08/15/19   ECHO ADULT FOLLOW-UP OR LIMITED 08/15/2019 8/15/2019    Narrative · Left Ventricle: Normal systolic function (ejection fraction normal). Small left ventricle. Mild concentric hypertrophy. Estimated left   ventricular ejection fraction is 66 - 70%. No regional wall motion   abnormality noted. Unable to grade but suspect at abnromal left   ventricular diastolic dysfunction.   · Right Atrium: Mildly dilated right atrium. · Pulmonary Artery: Moderate pulmonary hypertension. Pulmonary arterial   systolic pressure is 57 mmHg. Signed by: Cori Pierre DO         PFT: severe obstruction FEV1 38% with reversible component. DLCO reduced to 31%    ASSESSMENT and PLAN  Encounter Diagnoses   Name Primary?  COPD, severe ([address] Yes    Pulmonary hypertension ([address]     Obesity (BMI 30.0-34. 9)     Hypoxemia requiring supplemental oxygen      Pt with Pulmonary HTN Grp 1 per  history, however pt also with advanced COPD, so likely Grp 3 predominantly. Response to Trelegy is reassuring, will continue, refills sent. Schedule chest Echo to follow R sided pressures. Pt to complete last refill of Sildenafil, will monitor symptoms and echo after that. NIV ordered previously, will investigate why [employer] company has not delivered this as yet. RTC 6 months or sooner prn. Further intervention pending test results.

## 2021-06-29 NOTE — PROGRESS NOTES
Influenza vaccine received from PCP's office on December 2020 per patient and COVID vaccine Joann Barahona) received from [facility] on 3/11/2021 (1st dose) and 4/15/2021 (2nd dose) per vaccine card. Card copied and scanned to patient's chart. Immunization record updated.

## 2021-07-02 ENCOUNTER — HOSPITAL ENCOUNTER (OUTPATIENT)
Dept: LAB | Age: 66
Discharge: HOME | End: 2021-07-02
Payer: MEDICARE

## 2021-07-02 LAB
ANION GAP SERPL CALC-SCNC: 4 MMOL/L (ref 3–18)
BUN SERPL-MCNC: 22 MG/DL (ref 7–18)
BUN/CREAT SERPL: 31 (ref 12–20)
CALCIUM SERPL-MCNC: 9.2 MG/DL (ref 8.5–10.1)
CHLORIDE SERPL-SCNC: 102 MMOL/L (ref 100–111)
CO2 SERPL-SCNC: 35 MMOL/L (ref 21–32)
CREAT SERPL-MCNC: 0.71 MG/DL (ref 0.6–1.3)
GLUCOSE SERPL-MCNC: 104 MG/DL (ref 74–99)
POTASSIUM SERPL-SCNC: 5.4 MMOL/L (ref 3.5–5.5)
SODIUM SERPL-SCNC: 141 MMOL/L (ref 136–145)

## 2021-07-02 PROCEDURE — 36415 COLL VENOUS BLD VENIPUNCTURE: CPT

## 2021-07-02 PROCEDURE — 80048 BASIC METABOLIC PNL TOTAL CA: CPT

## 2021-07-06 ENCOUNTER — TELEPHONE (OUTPATIENT)
Dept: PULMONOLOGY | Age: 66
End: 2021-07-06

## 2021-07-06 NOTE — TELEPHONE ENCOUNTER
Pulmonary Rehab spoke to patient's daughter about the maintenance program at the [facility]. She did not seem interested in this option. It was explained that a referral could be put in for patient to return to program as a regular, non maintenance patient, but there is a wait list at this time. She stated that she will call Dr. Sudeep Snyder office and let them know.      Thank you,  Alyssia Kent

## 2021-07-12 NOTE — TELEPHONE ENCOUNTER
Pt daughter states pt was supposed to be getting a prescription for Flonase to be sent to [facility]. Also wanted to make sure pt was not needing any other medications refilled. Please advise [phone].

## 2021-07-13 RX ORDER — FLUTICASONE PROPIONATE 50 MCG
2 SPRAY, SUSPENSION (ML) NASAL DAILY
Qty: 3 BOTTLE | Refills: 0 | Status: SHIPPED | OUTPATIENT
Start: 2021-07-13

## 2021-07-29 PROBLEM — Z01.818 PRE-TRANSPLANT EVALUATION FOR LUNG TRANSPLANT: Status: RESOLVED | Noted: 2021-06-29 | Resolved: 2021-07-29

## 2021-09-21 ENCOUNTER — TELEPHONE (OUTPATIENT)
Dept: PULMONOLOGY | Age: 66
End: 2021-09-21

## 2021-09-21 NOTE — LETTER
Paige Alanis        [birth year]  [address]        Dear Juliet Gutierrez has been trying to reach re: shipping your Ambrisentan prescription to you. Please contact [facility] at [phone], to schedule your delivery. You will be due for a follow up with Dr. Ambrocio South in December 2021. To schedule your follow up appointment or if you have any questions or concerns, please contact our office at [phone].          Thank Fran Santos LPN

## 2021-12-19 ENCOUNTER — TRANSCRIBE ORDER (OUTPATIENT)
Dept: SCHEDULING | Age: 66
End: 2021-12-19

## 2021-12-19 DIAGNOSIS — Z00.00 HEALTH MAINTENANCE EXAMINATION: Primary | ICD-10-CM

## 2022-01-20 ENCOUNTER — TRANSCRIBE ORDER (OUTPATIENT)
Dept: SCHEDULING | Age: 67
End: 2022-01-20

## 2022-01-20 DIAGNOSIS — Z12.31 VISIT FOR SCREENING MAMMOGRAM: Primary | ICD-10-CM

## 2022-01-25 ENCOUNTER — OFFICE VISIT (OUTPATIENT)
Dept: PULMONOLOGY | Age: 67
End: 2022-01-25
Payer: MEDICARE

## 2022-01-25 VITALS
RESPIRATION RATE: 20 BRPM | SYSTOLIC BLOOD PRESSURE: 182 MMHG | WEIGHT: 194.4 LBS | TEMPERATURE: 96.9 F | HEIGHT: 65 IN | DIASTOLIC BLOOD PRESSURE: 83 MMHG | BODY MASS INDEX: 32.39 KG/M2 | OXYGEN SATURATION: 99 % | HEART RATE: 76 BPM

## 2022-01-25 DIAGNOSIS — E66.9 OBESITY (BMI 30.0-34.9): ICD-10-CM

## 2022-01-25 DIAGNOSIS — I27.20 PULMONARY HYPERTENSION (HCC): ICD-10-CM

## 2022-01-25 DIAGNOSIS — Z99.81 HYPOXEMIA REQUIRING SUPPLEMENTAL OXYGEN: ICD-10-CM

## 2022-01-25 DIAGNOSIS — R09.02 HYPOXEMIA REQUIRING SUPPLEMENTAL OXYGEN: ICD-10-CM

## 2022-01-25 DIAGNOSIS — J44.9 COPD, SEVERE (HCC): Primary | ICD-10-CM

## 2022-01-25 PROCEDURE — G8754 DIAS BP LESS 90: HCPCS | Performed by: INTERNAL MEDICINE

## 2022-01-25 PROCEDURE — G8432 DEP SCR NOT DOC, RNG: HCPCS | Performed by: INTERNAL MEDICINE

## 2022-01-25 PROCEDURE — G8427 DOCREV CUR MEDS BY ELIG CLIN: HCPCS | Performed by: INTERNAL MEDICINE

## 2022-01-25 PROCEDURE — G8536 NO DOC ELDER MAL SCRN: HCPCS | Performed by: INTERNAL MEDICINE

## 2022-01-25 PROCEDURE — 3017F COLORECTAL CA SCREEN DOC REV: CPT | Performed by: INTERNAL MEDICINE

## 2022-01-25 PROCEDURE — G8400 PT W/DXA NO RESULTS DOC: HCPCS | Performed by: INTERNAL MEDICINE

## 2022-01-25 PROCEDURE — 99214 OFFICE O/P EST MOD 30 MIN: CPT | Performed by: INTERNAL MEDICINE

## 2022-01-25 PROCEDURE — G8417 CALC BMI ABV UP PARAM F/U: HCPCS | Performed by: INTERNAL MEDICINE

## 2022-01-25 PROCEDURE — G9899 SCRN MAM PERF RSLTS DOC: HCPCS | Performed by: INTERNAL MEDICINE

## 2022-01-25 PROCEDURE — G8752 SYS BP LESS 140: HCPCS | Performed by: INTERNAL MEDICINE

## 2022-01-25 PROCEDURE — 1101F PT FALLS ASSESS-DOCD LE1/YR: CPT | Performed by: INTERNAL MEDICINE

## 2022-01-25 PROCEDURE — 1090F PRES/ABSN URINE INCON ASSESS: CPT | Performed by: INTERNAL MEDICINE

## 2022-01-25 RX ORDER — AZELASTINE 1 MG/ML
SPRAY, METERED NASAL
COMMUNITY
Start: 2021-12-08

## 2022-01-25 RX ORDER — SERTRALINE HYDROCHLORIDE 50 MG/1
50 TABLET, FILM COATED ORAL DAILY
COMMUNITY
Start: 2022-01-21

## 2022-01-25 NOTE — PROGRESS NOTES
Gurvinder Goode presents today for   Chief Complaint   Patient presents with    COPD     follow up from 6/29/2021    Other     pulmonary HTN, hypoxemia requiring supplemental O2       Is someone accompanying this pt? Yes. daughter    Is the patient using any DME equipment during OV? Yes, O2, walker   -DME [employer]    Depression Screening:  3 most recent PHQ Screens 6/29/2021   PHQ Not Done -   Little interest or pleasure in doing things Not at all   Feeling down, depressed, irritable, or hopeless Not at all   Total Score PHQ 2 0       Learning Assessment:  Learning Assessment 2/12/2021   PRIMARY LEARNER Patient   PRIMARY LANGUAGE ENGLISH   LEARNER PREFERENCE PRIMARY DEMONSTRATION   ANSWERED BY patient   RELATIONSHIP SELF       Abuse Screening:  Abuse Screening Questionnaire 2/12/2021   Do you ever feel afraid of your partner? N   Are you in a relationship with someone who physically or mentally threatens you? N   Is it safe for you to go home? Y       Fall Risk  Fall Risk Assessment, last 12 mths 1/25/2022   Able to walk? Yes   Fall in past 12 months? 1   Do you feel unsteady? 1   Are you worried about falling 1   Is the gait abnormal? 1   Number of falls in past 12 months 1   Fall with injury? 0         Coordination of Care:  1. Have you been to the ER, urgent care clinic since your last visit? Hospitalized since your last visit? No    2. Have you seen or consulted any other health care providers outside of the [address] [place] [address] since your last visit? Include any pap smears or colon screening. YEs. Dr. Milly Barrientos, PCP, Dr. Evan Monteiro. Pavan Roblero, neurologist, Dr. Evan Monteiro. Jo Ann Kohler, cardiologist    Influenza vaccine received from PCP's office on October 2021 per patient. Immunization record updated.

## 2022-01-25 NOTE — PROGRESS NOTES
HISTORY OF PRESENT ILLNESS  Jacek Mayers is a 74 y.o. female following up for COPD and pulmonary hypertension. Severe COPD FEV1 45% and Pulmonary HTN,  PASP 57 mm Hg. Patient is a 59 y. o.AA female with severe COPD and Pulmonary HTN with hypoxemia needing home oxygen with low O2 sats at baseline. She was followed by Dr. Dileep Arriaga who started her on Letairis 5 mg daily. She is also on Sildenafil. She was admitted to [facility] end of Dec 2015 with respiratory failure, worsened hypoxemia, cough, blood streaked sputum. CT chest and CXR showing new RT upper lobe pneumonia and cavitation and ended up in ICU on BIPAP. She also underwent a bronchoscopy and BAL in the icu on 1/4/16 showing ELSA and needing triple therapy. Pt is compliant with supplemental O2 which she now uses continuously. She has noted desaturation to the low 80's on O2 with activity anu portable system only goes to 6 LPM.     Pt was switched from Symbicort to Trelegy on a prior visit with significant improvement in baseline dyspnea and exercise tolerance. Pt also recalls improvement in symptoms after completing pulmonary rehab but did not proceed with maintenance due to the pandemic. Pt was evaluated at [facility] for possible lung transplant but was taken off the referral list for noncompliance with visits and for continued ETOH. Pt has been sober for 2 months, this is confirmed by her daughter who is accompanying pt today. Review of Systems   Constitutional: Positive for malaise/fatigue (improving). Negative for chills, diaphoresis, fever and weight loss. Weight gain   HENT: Negative for congestion, ear discharge, ear pain, hearing loss, nosebleeds, sinus pain, sore throat and tinnitus. Eyes: Negative for blurred vision, double vision, photophobia, pain, discharge and redness. Respiratory: Positive for cough. Negative for hemoptysis, sputum production and stridor. Shortness of breath: with activity.  Wheezing: occasional. Cardiovascular: Negative for chest pain, palpitations, orthopnea, claudication and PND. Leg swelling: intermittent. Gastrointestinal: Negative for abdominal pain, blood in stool, constipation, diarrhea, heartburn, melena, nausea and vomiting. Genitourinary: Negative for dysuria, flank pain, frequency, hematuria and urgency. Musculoskeletal: Positive for joint pain. Negative for back pain, falls, myalgias and neck pain. Skin: Negative for itching and rash. Neurological: Negative for dizziness, tingling, tremors, sensory change, speech change, focal weakness, seizures, loss of consciousness, weakness and headaches. Endo/Heme/Allergies: Negative for environmental allergies and polydipsia. Does not bruise/bleed easily. Psychiatric/Behavioral: Positive for substance abuse. Negative for depression, hallucinations, memory loss and suicidal ideas. The patient is not nervous/anxious and does not have insomnia. Past Medical History:   Diagnosis Date    LALO (acute kidney injury) ([facility] [address] 12/27/2015    ARF (acute respiratory failure) ([facility] [address] 12/27/2015    Asthma     Chronic lung disease     COPD [facility]ch[facility] obstructive pulmonary disease) ([facility]     Dependence on continuous supplemental oxygen     Diastolic heart failure ([facility])     Echocardiogram abnormal 12/29/2015    Hyperdynamic. EF 75%. No WMA. Gr 1 DDfx. Mild RVE. RVSP 60-70 mmHg (prev 120 mmHg on 10/1/15). Mild LAE. Mod-severe ELISHA. Mild-mod TR.  Gastric wall thickening 2009    Chronic Proximal Gastric Wall Calcifications.  Hyperlipidemia     Hypertension     ANNELIESE (obstructive sleep apnea)     Pulmonary arterial hypertension ([facility])     Pulmonary hypertension ([facility])     RVSP 120 mm Hg, cor pulmonale (echo Oct 2015)    Right upper lobe pneumonia 12/27/2015    Cavitary RUL Pneumonia    TB lung, latent 1986    Diagnosed/Treated.  Thallium stress test 11/12/2002    No prior infarction.   Minimal to mild apical ischemia vs apical thinning. RVE.  Tuberculosis      Past Surgical History:   Procedure Laterality Date    BRONCHOSCOPY  01/04/2016    Dr. Angelo Connolly HX DILATION AND CURETTAGE      HX GASTRIC BYPASS      HX HYSTERECTOMY      HX ORTHOPAEDIC      bone spur    HX TONSILLECTOMY      HX TUBAL LIGATION       Current Outpatient Medications on File Prior to Visit   Medication Sig Dispense Refill    azelastine (ASTELIN) 137 mcg (0.1 %) nasal spray instill 1 SPRAY into each nostril TWICE DAILY      sertraline (ZOLOFT) 50 mg tablet Take 50 mg by mouth daily.  fluticasone-umeclidinium-vilanterol (Trelegy Ellipta) 100-62.5-25 mcg inhaler Take 1 Puff by inhalation daily. 3 Inhaler 3    Ambrisentan 5 mg tablet TAKE 1 TABLET BY MOUTH EVERY DAY (Patient taking differently: Take 5 mg by mouth daily.) 30 Tab 11    albuterol (PROVENTIL HFA, VENTOLIN HFA, PROAIR HFA) 90 mcg/actuation inhaler Take 2 Puffs by inhalation every four (4) hours as needed for Wheezing or Shortness of Breath. 1 Inhaler 0    benazepriL (LOTENSIN) 10 mg tablet Take 10 mg by mouth daily. Pt taking 30 mg daily.  multivitamin (ONE A DAY) tablet Take 1 Tab by mouth daily.  sildenafil, antihypertensive, (REVATIO) 20 mg tablet TAKE 1 TABLET BY MOUTH THREE TIMES DAILY (Patient taking differently: Take 20 mg by mouth three (3) times daily.) 90 Tab 0    albuterol-ipratropium (DUO-NEB) 2.5 mg-0.5 mg/3 ml nebu 3 mL by Nebulization route every six (6) hours as needed. Diag. Code: J44.9 360 Nebule 3    OXYGEN-AIR DELIVERY SYSTEMS 6 L by Does Not Apply route. Uses 4 l when out   Indications: DME: [facility]      furosemide (LASIX) 40 mg tablet Take 1 Tab by mouth daily. 30 Tab 3    pravastatin (PRAVACHOL) 20 mg tablet Take 20 mg by mouth nightly.  benazepril (LOTENSIN) 20 mg tablet Take 20 mg by mouth daily. Pt taking 30 mg daily.       fluticasone propionate (FLONASE) 50 mcg/actuation nasal spray 2 Sprays by Both Nostrils route daily. (Patient not taking: Reported on 2022) 3 Bottle 0    cholecalciferol (VITAMIN D3) (50,000 UNITS /1250 MCG) capsule Take 1 Capsule by mouth. (Patient not taking: Reported on 2022)       No current facility-administered medications on file prior to visit. No Known Allergies    Family History   Problem Relation Age of Onset    Asthma Maternal Aunt     Diabetes Daughter      Social History     Socioeconomic History    Marital status: SINGLE     Spouse name: Not on file    Number of children: Not on file    Years of education: Not on file    Highest education level: Not on file   Occupational History    Not on file   Tobacco Use    Smoking status: Former Smoker     Packs/day: 2.00     Years: 20.00     Pack years: 40.00     Types: Cigarettes     Quit date: 1990     Years since quittin.0    Smokeless tobacco: Never Used   Vaping Use    Vaping Use: Never used   Substance and Sexual Activity    Alcohol use: Yes     Comment: social     Drug use: No    Sexual activity: Not on file   Other Topics Concern    Not on file   Social History Narrative    Not on file     Social Determinants of Health     Financial Resource Strain:     Difficulty of Paying Living Expenses: Not on file   Food Insecurity:     Worried About [address] in the Last Year: Not on file    [place] of Food in the Last Year: Not on file   Transportation Needs:     Lack of Transportation (Medical): Not on file    Lack of Transportation (Non-Medical):  Not on file   Physical Activity:     Days of Exercise per Week: Not on file    Minutes of Exercise per Session: Not on file   Stress:     Feeling of Stress : Not on file   Social Connections:     Frequency of Communication with Friends and Family: Not on file    Frequency of Social Gatherings with Friends and Family: Not on file    Attends [employer] Services: Not on file    Active Member of Clubs or Organizations: Not on file    Attends Club or Organization Meetings: Not on file    Marital Status: Not on file   Intimate Partner Violence:     Fear of Current or Ex-Partner: Not on file    Emotionally Abused: Not on file    Physically Abused: Not on file    Sexually Abused: Not on file   Housing Stability:     Unable to Pay for Housing in the Last Year: Not on file    Number of [place] in the Last Year: Not on file    Unstable Housing in the Last Year: Not on file     Blood pressure 135/78, pulse 80, temperature 96.9 °F (36.1 °C), temperature source Temporal, resp. rate 20, height 5' 5\" (1.651 m), weight 88.2 kg (194 lb 6.4 oz), SpO2 99 %. Ambulatory oximetry per RT note  Physical Exam  Constitutional:       General: She is not in acute distress. Appearance: She is well-developed. She is not ill-appearing, toxic-appearing or diaphoretic. Comments: Uses a walker, obese   HENT:      Head: Normocephalic and atraumatic. Right Ear: External ear normal.      Left Ear: Ear canal normal.      Nose:      Comments: Deferred      Mouth/Throat:      Comments: Deferred   Eyes:      General: No scleral icterus. Right eye: No discharge. Left eye: No discharge. Conjunctiva/sclera: Conjunctivae normal.      Pupils: Pupils are equal, round, and reactive to light. Neck:      Thyroid: No thyromegaly. Vascular: No carotid bruit or JVD. Trachea: No tracheal deviation. Cardiovascular:      Rate and Rhythm: Normal rate and regular rhythm. Heart sounds: Normal heart sounds. No murmur heard. No friction rub. No gallop. Pulmonary:      Effort: Pulmonary effort is normal. No respiratory distress. Breath sounds: No stridor. No wheezing or rales. Chest:      Chest wall: No tenderness. Abdominal:      Palpations: Abdomen is soft. There is no mass. Tenderness: There is no abdominal tenderness. There is no rebound. Musculoskeletal:         General: No swelling, tenderness or deformity. Cervical back: No rigidity or tenderness. Right lower leg: Right lower leg edema: trace. Left lower leg: Left lower leg edema: trace. Lymphadenopathy:      Cervical: No cervical adenopathy. Skin:     General: Skin is warm and dry. Coloration: Skin is not jaundiced or pale. Findings: No bruising, erythema, lesion or rash. Neurological:      General: No focal deficit present. Mental Status: She is alert and oriented to person, place, and time. Coordination: Coordination normal.   Psychiatric:         Mood and Affect: Mood normal.         Behavior: Behavior normal.         Thought Content: Thought content normal.         Judgment: Judgment normal.       CT Results (most recent):  Results from Hospital Encounter encounter on 04/20/21    CT CHEST WO CONT    Narrative  CT CHEST WITHOUT IV CONTRAST      COMPARISON: 15 August 2019. INDICATIONS: Lung nodules. TECHNIQUE: Volumetric data acquisition was performed through the chest with a  multislice scanner. Reconstructions were created in the axial, coronal, and  sagittal planes. FINDINGS:    Thyroid/Base Of Neck:  Unremarkable  . Lungs: There is severe chronic obstructive lung disease predominantly emphysema with  bullous changes. .  A 2 mm nodule in the right upper lobe laterally on image 34 is unchanged. Bronchiectasis and scarring is evident in the middle lobe and lingula platelike  atelectasis or scar is evident in the right lower lobe. Some reticular scarring  is seen in the medial right upper lobe. These findings are stable. .. Pleural Spaces: There is no pneumothorax or pleural fluid evident. No pleural plaques are seen  Normal in size and number  Lymph Nodes:  Axillae: Belkys Chime Mediastinum / Albertina: Normal in size and number. Mediastinum, Great Vessels And Heart: There is no mediastinal mass. The heart and great vessels appear unchanged.     Abdomen Structures Included:  Small granulomas are seen in a relatively small spleen. Previous gastric bypass  looks unremarkable. Gallstones are unchanged. Augustine Gutter      Osseous Structures: Unremarkable for age. Impression  Severe chronic obstructive pulmonary disease. .  Scarring is evident in the bases bilaterally unchanged  A tiny right upper lobe nodule is unchanged. .    All CT scans at this facility are performed using dose optimization technique as  appropriate to the performed exam, to include automated exposure control,  adjustment of the mA and/or kV according to patient's size (Including  appropriate matching for site-specific examinations), or use of iterative  reconstruction technique. 08/15/19   ECHO ADULT FOLLOW-UP OR LIMITED 08/15/2019 8/15/2019    Narrative · Left Ventricle: Normal systolic function (ejection fraction normal). Small left ventricle. Mild concentric hypertrophy. Estimated left   ventricular ejection fraction is 66 - 70%. No regional wall motion   abnormality noted. Unable to grade but suspect at abnromal left   ventricular diastolic dysfunction. · Right Atrium: Mildly dilated right atrium. · Pulmonary Artery: Moderate pulmonary hypertension. Pulmonary arterial   systolic pressure is 57 mmHg. Signed by: Cierra House DO         PFT: severe obstruction FEV1 38% with reversible component. DLCO reduced to 31%    ASSESSMENT and PLAN  Encounter Diagnoses   Name Primary?  COPD, severe ([facility] [address] Yes    Hypoxemia requiring supplemental oxygen     Pulmonary hypertension (HCC)     Obesity (BMI 30.0-34. 9)      Pt with Pulmonary HTN Grp 1 per  history, however pt also with advanced COPD, so likely Grp 1 and 3 combined. Response to Trelegy is reassuring, will continue, refills sent. Schedule chest Echo to follow R sided pressures. Continue Sildenafil and Ambrisentan  DME written for oxymizer nasal cannula and tubing   Pt will continue to benefit from supplemental O2  RTC 6 months or sooner prn.   Further intervention pending test results and response to therapy.

## 2022-02-03 ENCOUNTER — HOSPITAL ENCOUNTER (OUTPATIENT)
Dept: MAMMOGRAPHY | Age: 67
Discharge: HOME | End: 2022-02-03
Attending: FAMILY MEDICINE
Payer: MEDICARE

## 2022-02-03 DIAGNOSIS — Z12.31 VISIT FOR SCREENING MAMMOGRAM: ICD-10-CM

## 2022-02-03 PROCEDURE — 77063 BREAST TOMOSYNTHESIS BI: CPT

## 2022-02-03 RX ORDER — FLUTICASONE FUROATE, UMECLIDINIUM BROMIDE AND VILANTEROL TRIFENATATE 100; 62.5; 25 UG/1; UG/1; UG/1
POWDER RESPIRATORY (INHALATION)
Qty: 180 BLISTER | Refills: 5 | Status: SHIPPED | OUTPATIENT
Start: 2022-02-03

## 2022-03-18 PROBLEM — Z20.1 TUBERCULOSIS EXPOSURE: Status: ACTIVE | Noted: 2018-05-15

## 2022-03-19 PROBLEM — Z99.81 OXYGEN DEPENDENT: Status: ACTIVE | Noted: 2018-05-15

## 2022-03-19 PROBLEM — A31.0 MAI (MYCOBACTERIUM AVIUM-INTRACELLULARE) (HCC): Status: ACTIVE | Noted: 2018-05-15

## 2022-03-19 PROBLEM — E66.811 OBESITY (BMI 30.0-34.9): Status: ACTIVE | Noted: 2018-05-15

## 2022-03-19 PROBLEM — E66.9 OBESITY (BMI 30.0-34.9): Status: ACTIVE | Noted: 2018-05-15

## 2022-03-19 PROBLEM — J96.12 CHRONIC HYPERCAPNIC RESPIRATORY FAILURE (HCC): Status: ACTIVE | Noted: 2018-05-16

## 2022-04-25 DIAGNOSIS — I27.20 PULMONARY HYPERTENSION (HCC): ICD-10-CM

## 2022-04-25 RX ORDER — AMBRISENTAN 5 MG/1
TABLET, FILM COATED ORAL
Qty: 30 TABLET | Refills: 11 | Status: SHIPPED | OUTPATIENT
Start: 2022-04-25 | End: 2022-04-26 | Stop reason: SDUPTHER

## 2022-04-25 NOTE — TELEPHONE ENCOUNTER
[facility] calling to check on status of ambrisentan. We just received the fax this morning @ 9:21 am. Informed them that it needs to be approved by the doctor before if can be called in.

## 2022-04-26 ENCOUNTER — HOSPITAL ENCOUNTER (OUTPATIENT)
Dept: NON INVASIVE DIAGNOSTICS | Age: 67
Discharge: HOME | End: 2022-04-26
Attending: INTERNAL MEDICINE
Payer: MEDICARE

## 2022-04-26 ENCOUNTER — TELEPHONE (OUTPATIENT)
Dept: PULMONOLOGY | Age: 67
End: 2022-04-26

## 2022-04-26 VITALS
DIASTOLIC BLOOD PRESSURE: 83 MMHG | SYSTOLIC BLOOD PRESSURE: 182 MMHG | HEIGHT: 65 IN | BODY MASS INDEX: 32.32 KG/M2 | WEIGHT: 194 LBS

## 2022-04-26 DIAGNOSIS — I27.20 PULMONARY HYPERTENSION (HCC): ICD-10-CM

## 2022-04-26 LAB
ECHO LV FRACTIONAL SHORTENING: 41 % (ref 28–44)
ECHO LV INTERNAL DIMENSION DIASTOLE INDEX: 2 CM/M2
ECHO LV INTERNAL DIMENSION DIASTOLIC: 3.9 CM (ref 3.9–5.3)
ECHO LV INTERNAL DIMENSION SYSTOLIC INDEX: 1.18 CM/M2
ECHO LV INTERNAL DIMENSION SYSTOLIC: 2.3 CM
ECHO LV IVSD: 0.8 CM (ref 0.6–0.9)
ECHO LV MASS 2D: 105.3 G (ref 67–162)
ECHO LV MASS INDEX 2D: 54 G/M2 (ref 43–95)
ECHO LV POSTERIOR WALL DIASTOLIC: 1 CM (ref 0.6–0.9)
ECHO LV RELATIVE WALL THICKNESS RATIO: 0.51
ECHO PULMONARY ARTERY SYSTOLIC PRESSURE (PASP): 50 MMHG
ECHO TV REGURGITANT MAX VELOCITY: 3.23 M/S
ECHO TV REGURGITANT PEAK GRADIENT: 42 MMHG

## 2022-04-26 PROCEDURE — 93325 DOPPLER ECHO COLOR FLOW MAPG: CPT

## 2022-04-26 RX ORDER — AMBRISENTAN 5 MG/1
TABLET, FILM COATED ORAL
Qty: 30 TABLET | Refills: 11 | Status: SHIPPED | COMMUNITY
Start: 2022-04-26

## 2022-04-26 NOTE — TELEPHONE ENCOUNTER
Patient's daughter notified that medication has been sent to speciality pharmacy today. MsThania Presley Ramires verbalized understanding.

## 2022-04-26 NOTE — TELEPHONE ENCOUNTER
Pt daughter called checking the status of the medication that needs to have doctor approval on. Req a callback.

## 2022-04-28 ENCOUNTER — TELEPHONE (OUTPATIENT)
Dept: PULMONOLOGY | Age: 67
End: 2022-04-28

## 2022-06-29 ENCOUNTER — OFFICE VISIT (OUTPATIENT)
Dept: PULMONOLOGY | Age: 67
End: 2022-06-29
Payer: MEDICARE

## 2022-06-29 VITALS
OXYGEN SATURATION: 96 % | SYSTOLIC BLOOD PRESSURE: 136 MMHG | TEMPERATURE: 97.1 F | BODY MASS INDEX: 32.22 KG/M2 | WEIGHT: 193.4 LBS | DIASTOLIC BLOOD PRESSURE: 70 MMHG | RESPIRATION RATE: 18 BRPM | HEART RATE: 80 BPM | HEIGHT: 65 IN

## 2022-06-29 DIAGNOSIS — R09.02 HYPOXEMIA REQUIRING SUPPLEMENTAL OXYGEN: ICD-10-CM

## 2022-06-29 DIAGNOSIS — I27.20 PULMONARY HYPERTENSION (HCC): Primary | ICD-10-CM

## 2022-06-29 DIAGNOSIS — E66.9 OBESITY (BMI 30.0-34.9): ICD-10-CM

## 2022-06-29 DIAGNOSIS — J44.9 COPD, SEVERE (HCC): ICD-10-CM

## 2022-06-29 DIAGNOSIS — Z99.81 HYPOXEMIA REQUIRING SUPPLEMENTAL OXYGEN: ICD-10-CM

## 2022-06-29 PROCEDURE — 3017F COLORECTAL CA SCREEN DOC REV: CPT | Performed by: INTERNAL MEDICINE

## 2022-06-29 PROCEDURE — G8752 SYS BP LESS 140: HCPCS | Performed by: INTERNAL MEDICINE

## 2022-06-29 PROCEDURE — G8536 NO DOC ELDER MAL SCRN: HCPCS | Performed by: INTERNAL MEDICINE

## 2022-06-29 PROCEDURE — 1123F ACP DISCUSS/DSCN MKR DOCD: CPT | Performed by: INTERNAL MEDICINE

## 2022-06-29 PROCEDURE — 1101F PT FALLS ASSESS-DOCD LE1/YR: CPT | Performed by: INTERNAL MEDICINE

## 2022-06-29 PROCEDURE — G8754 DIAS BP LESS 90: HCPCS | Performed by: INTERNAL MEDICINE

## 2022-06-29 PROCEDURE — G8417 CALC BMI ABV UP PARAM F/U: HCPCS | Performed by: INTERNAL MEDICINE

## 2022-06-29 PROCEDURE — G8400 PT W/DXA NO RESULTS DOC: HCPCS | Performed by: INTERNAL MEDICINE

## 2022-06-29 PROCEDURE — G8510 SCR DEP NEG, NO PLAN REQD: HCPCS | Performed by: INTERNAL MEDICINE

## 2022-06-29 PROCEDURE — 99214 OFFICE O/P EST MOD 30 MIN: CPT | Performed by: INTERNAL MEDICINE

## 2022-06-29 PROCEDURE — G9899 SCRN MAM PERF RSLTS DOC: HCPCS | Performed by: INTERNAL MEDICINE

## 2022-06-29 PROCEDURE — G8427 DOCREV CUR MEDS BY ELIG CLIN: HCPCS | Performed by: INTERNAL MEDICINE

## 2022-06-29 PROCEDURE — 1090F PRES/ABSN URINE INCON ASSESS: CPT | Performed by: INTERNAL MEDICINE

## 2022-06-29 NOTE — PROGRESS NOTES
Muna Parra presents today for   Chief Complaint   Patient presents with    Follow-up    Breathing Problem       Is someone accompanying this pt?yes    Is the patient using any DME equipment during OV? [employer]  walker    -D[facility]    Depression Screening:  3 most recent PHQ Screens 6/29/2022   PHQ Not Done -   Little interest or pleasure in doing things Not at all   Feeling down, depressed, irritable, or hopeless Not at all   Total Score PHQ 2 0       Learning Assessment:  Learning Assessment 2/12/2021   PRIMARY LEARNER Patient   PRIMARY LANGUAGE ENGLISH   LEARNER PREFERENCE PRIMARY DEMONSTRATION   ANSWERED BY patient   RELATIONSHIP SELF       Abuse Screening:  Abuse Screening Questionnaire 2/12/2021   Do you ever feel afraid of your partner? N   Are you in a relationship with someone who physically or mentally threatens you? N   Is it safe for you to go home? Y       Fall Risk  Fall Risk Assessment, last 12 mths 1/25/2022   Able to walk? Yes   Fall in past 12 months? 1   Do you feel unsteady? 1   Are you worried about falling 1   Is the gait abnormal? 1   Number of falls in past 12 months 1   Fall with injury? 0         Coordination of Care:  1. Have you been to the ER, urgent care clinic since your last visit? Hospitalized since your last visit? no    2. Have you seen or consulted any other health care providers outside of the [address] [place] [address] since your last visit? Include any pap smears or colon screening.  no

## 2022-06-29 NOTE — PROGRESS NOTES
HISTORY OF PRESENT ILLNESS  Aidee Mojica is a 69 y.o. female following up for COPD and pulmonary hypertension. Severe COPD on LTOT, and Pulmonary HTN  Patient is a 59 y. o.AA female with severe COPD and Pulmonary HTN with hypoxemia needing home oxygen with low O2 sats at baseline. She was followed by Dr. Shilpa Cabello who started her on Letairis 5 mg daily. She is also on Sildenafil which was discontinued in the interim. She was admitted to [facility] end of Dec 2015 with respiratory failure, worsened hypoxemia, cough, blood streaked sputum. CT chest and CXR showing new RT upper lobe pneumonia and cavitation and ended up in ICU on BIPAP. She also underwent a bronchoscopy and BAL in the icu on 1/4/16 showing ELSA and completed triple therapy. Pt is compliant with supplemental O2 which she now uses continuously. SpO2 ranges on the low to mid 90's. Pt was switched from Symbicort to Trelegy on a prior visit with significant improvement in dyspnea and exercise tolerance. Pt also recalls improvement in symptoms after completing pulmonary rehab but did not proceed with maintenance due to the pandemic. Pt was evaluated at [facility] for possible lung transplant but was taken off the referral list for noncompliance with visits and for continued ETOH. Pt has been sober for 2 months, this is confirmed by her daughter who is accompanying pt today. Review of Systems   Constitutional: Positive for malaise/fatigue (improving). Negative for chills, diaphoresis, fever and weight loss. Weight gain   HENT: Negative for ear discharge, hearing loss, nosebleeds and tinnitus. Eyes: Negative for blurred vision, double vision, photophobia, pain, discharge and redness. Respiratory: Negative for hemoptysis, sputum production and stridor. Cardiovascular: Negative for palpitations, orthopnea, claudication and PND. Leg swelling: intermittent.    Gastrointestinal: Negative for blood in stool, constipation, heartburn and melena. Genitourinary: Negative for flank pain, frequency, hematuria and urgency. Musculoskeletal: Negative for back pain, falls and myalgias. Skin: Negative for itching. Neurological: Negative for dizziness, tingling, tremors, sensory change, speech change, focal weakness, seizures, loss of consciousness and weakness. Endo/Heme/Allergies: Negative for environmental allergies and polydipsia. Does not bruise/bleed easily. Psychiatric/Behavioral: Positive for substance abuse. Negative for depression, hallucinations, memory loss and suicidal ideas. The patient is not nervous/anxious and does not have insomnia. Past Medical History:   Diagnosis Date    [facility]ALO (acute kidney injury) ([facility] [address] 12/27/2015    ARF (acute respiratory failure) ([facility] [address] 12/27/2015    Asthma     Chronic lung disease     COPD [facility]ch[facility] obstructive pulmonary disease) ([facility])     Dependence on continuous supplemental oxygen     Diastolic heart failure ([facility])     Echocardiogram abnormal 12/29/2015    Hyperdynamic. EF 75%. No WMA. Gr 1 DDfx. Mild RVE. RVSP 60-70 mmHg (prev 120 mmHg on 10/1/15). Mild LAE. Mod-severe ELISHA. Mild-mod TR.  Gastric wall thickening 2009    Chronic Proximal Gastric Wall Calcifications.  Hyperlipidemia     Hypertension     ANNELIESE (obstructive sleep apnea)     Pulmonary arterial hypertension ([facility])     Pulmonary hypertension ([facility])     RVSP 120 mm Hg, cor pulmonale (echo Oct 2015)    Right upper lobe pneumonia 12/27/2015    Cavitary RUL Pneumonia    TB lung, latent 1986    Diagnosed/Treated.  Thallium stress test 11/12/2002    No prior infarction. Minimal to mild apical ischemia vs apical thinning. RVE.       Tuberculosis      Past Surgical History:   Procedure Laterality Date    BRONCHOSCOPY  01/04/2016    Dr. Jessica Terry HX DILATION AND CURETTAGE      HX GASTRIC BYPASS      HX HYSTERECTOMY      HX ORTHOPAEDIC      bone spur    HX TONSILLECTOMY      HX TUBAL LIGATION       Current Outpatient Medications on File Prior to Visit   Medication Sig Dispense Refill    Ambrisentan 5 mg tablet TAKE 1 TABLET BY MOUTH  DAILY 30 Tablet 11    Trelegy Ellipta 100-62.5-25 mcg inhaler INHALE 1 PUFF EVERY  Blister 5    azelastine (ASTELIN) 137 mcg (0.1 %) nasal spray instill 1 SPRAY into each nostril TWICE DAILY      sertraline (ZOLOFT) 50 mg tablet Take 50 mg by mouth daily.  albuterol (PROVENTIL HFA, VENTOLIN HFA, PROAIR HFA) 90 mcg/actuation inhaler Take 2 Puffs by inhalation every four (4) hours as needed for Wheezing or Shortness of Breath. 1 Inhaler 0    benazepriL (LOTENSIN) 10 mg tablet Take 10 mg by mouth daily. Pt taking 30 mg daily.  multivitamin (ONE A DAY) tablet Take 1 Tab by mouth daily.  sildenafil, antihypertensive, (REVATIO) 20 mg tablet TAKE 1 TABLET BY MOUTH THREE TIMES DAILY (Patient taking differently: Take 20 mg by mouth three (3) times daily.) 90 Tab 0    albuterol-ipratropium (DUO-NEB) 2.5 mg-0.5 mg/3 ml nebu 3 mL by Nebulization route every six (6) hours as needed. Diag. Code: J44.9 360 Nebule 3    OXYGEN-AIR DELIVERY SYSTEMS 6 L by Does Not Apply route. Uses 4 l when out   Indications: DME: [facility]      furosemide (LASIX) 40 mg tablet Take 1 Tab by mouth daily. 30 Tab 3    pravastatin (PRAVACHOL) 20 mg tablet Take 20 mg by mouth nightly.  benazepril (LOTENSIN) 20 mg tablet Take 20 mg by mouth daily. Pt taking 30 mg daily.  fluticasone propionate (FLONASE) 50 mcg/actuation nasal spray 2 Sprays by Both Nostrils route daily. (Patient not taking: Reported on 1/25/2022) 3 Bottle 0     No current facility-administered medications on file prior to visit.      No Known Allergies    Family History   Problem Relation Age of Onset    Asthma Maternal Aunt     Diabetes Daughter      Social History     Socioeconomic History    Marital status: SINGLE     Spouse name: Not on file    Number of children: Not on file    Years of education: Not on file    Highest education level: Not on file   Occupational History    Not on file   Tobacco Use    Smoking status: Former Smoker     Packs/day: 2.00     Years: 20.00     Pack years: 40.00     Types: Cigarettes     Quit date: 1990     Years since quittin.5    Smokeless tobacco: Never Used   Vaping Use    Vaping Use: Never used   Substance and Sexual Activity    Alcohol use: Not Currently     Comment: social     Drug use: No    Sexual activity: Not on file   Other Topics Concern    Not on file   Social History Narrative    Not on file     Social Determinants of Health     Financial Resource Strain:     Difficulty of Paying Living Expenses: Not on file   Food Insecurity:     Worried About Running Out of Food in the Last Year: Not on file    Britton of Food in the Last Year: Not on file   Transportation Needs:     Lack of Transportation (Medical): Not on file    Lack of Transportation (Non-Medical):  Not on file   Physical Activity:     Days of Exercise per Week: Not on file    Minutes of Exercise per Session: Not on file   Stress:     Feeling of Stress : Not on file   Social Connections:     Frequency of Communication with Friends and Family: Not on file    Frequency of Social Gatherings with Friends and Family: Not on file    Attends Rastafarian Services: Not on file    Active Member of [address] [place] [address] or Organizations: Not on file    Attends Club or Organization Meetings: Not on file    Marital Status: Not on file   Intimate Partner Violence:     Fear of Current or Ex-Partner: Not on file    Emotionally Abused: Not on file    Physically Abused: Not on file    Sexually Abused: Not on file   Housing Stability:     Unable to Pay for Housing in the Last Year: Not on file    Number of [place] in the Last Year: Not on file    Unstable Housing in the Last Year: Not on file     Blood pressure 136/70, pulse 80, temperature 97.1 °F (36.2 °C), temperature source Temporal, resp. rate 18, height 5' 5\" (1.651 m), weight 87.7 kg (193 lb 6.4 oz), SpO2 96 %. Ambulatory oximetry per RT note  Physical Exam  Constitutional:       General: She is not in acute distress. Appearance: She is well-developed. She is not ill-appearing, toxic-appearing or diaphoretic. Comments: Uses a walker, obese   HENT:      Head: Normocephalic and atraumatic. Right Ear: External ear normal.      Left Ear: Ear canal normal.      Nose:      Comments: Deferred      Mouth/Throat:      Comments: Deferred   Eyes:      General: No scleral icterus. Right eye: No discharge. Left eye: No discharge. Conjunctiva/sclera: Conjunctivae normal.      Pupils: Pupils are equal, round, and reactive to light. Neck:      Thyroid: No thyromegaly. Vascular: No carotid bruit or JVD. Trachea: No tracheal deviation. Cardiovascular:      Rate and Rhythm: Normal rate and regular rhythm. Heart sounds: Normal heart sounds. No murmur heard. No friction rub. No gallop. Pulmonary:      Effort: Pulmonary effort is normal. No respiratory distress. Breath sounds: No stridor. No wheezing or rales. Chest:      Chest wall: No tenderness. Abdominal:      Palpations: Abdomen is soft. There is no mass. Tenderness: There is no abdominal tenderness. There is no rebound. Musculoskeletal:         General: No swelling, tenderness or deformity. Cervical back: No rigidity or tenderness. Right lower leg: Right lower leg edema: trace. Left lower leg: Left lower leg edema: trace. Lymphadenopathy:      Cervical: No cervical adenopathy. Skin:     General: Skin is warm and dry. Coloration: Skin is not jaundiced or pale. Findings: No bruising, erythema, lesion or rash. Neurological:      General: No focal deficit present. Mental Status: She is alert and oriented to person, place, and time.       Coordination: Coordination normal.   Psychiatric: Mood and Affect: Mood normal.         Behavior: Behavior normal.         Thought Content: Thought content normal.         Judgment: Judgment normal.       CT Results (most recent):  Results from Hospital Encounter encounter on 04/20/21    CT CHEST WO CONT    Narrative  CT CHEST WITHOUT IV CONTRAST      COMPARISON: 15 August 2019. INDICATIONS: Lung nodules. TECHNIQUE: Volumetric data acquisition was performed through the chest with a  multislice scanner. Reconstructions were created in the axial, coronal, and  sagittal planes. FINDINGS:    Thyroid/Base Of Neck:  Unremarkable  . Lungs: There is severe chronic obstructive lung disease predominantly emphysema with  bullous changes. .  A 2 mm nodule in the right upper lobe laterally on image 34 is unchanged. Bronchiectasis and scarring is evident in the middle lobe and lingula platelike  atelectasis or scar is evident in the right lower lobe. Some reticular scarring  is seen in the medial right upper lobe. These findings are stable. .. Pleural Spaces: There is no pneumothorax or pleural fluid evident. No pleural plaques are seen  Normal in size and number  Lymph Nodes:  Axillae: Clement Fly Mediastinum / Albertina: Normal in size and number. Mediastinum, Great Vessels And Heart: There is no mediastinal mass. The heart and great vessels appear unchanged. Abdomen Structures Included:  Small granulomas are seen in a relatively small spleen. Previous gastric bypass  looks unremarkable. Gallstones are unchanged. Clement Fly      Osseous Structures: Unremarkable for age. Impression  Severe chronic obstructive pulmonary disease. .  Scarring is evident in the bases bilaterally unchanged  A tiny right upper lobe nodule is unchanged.   .    All CT scans at this facility are performed using dose optimization technique as  appropriate to the performed exam, to include automated exposure control,  adjustment of the mA and/or kV according to patient's size (Including  appropriate matching for site-specific examinations), or use of iterative  reconstruction technique. 08/15/19   ECHO ADULT FOLLOW-UP OR LIMITED 08/15/2019 8/15/2019    Narrative · Left Ventricle: Normal systolic function (ejection fraction normal). Small left ventricle. Mild concentric hypertrophy. Estimated left   ventricular ejection fraction is 66 - 70%. No regional wall motion   abnormality noted. Unable to grade but suspect at abnromal left   ventricular diastolic dysfunction. · Right Atrium: Mildly dilated right atrium. · Pulmonary Artery: Moderate pulmonary hypertension. Pulmonary arterial   systolic pressure is 57 mmHg. Signed by: Rafael RAMÍREZ DO     04/26/22    ECHO ADULT FOLLOW-UP OR LIMITED 04/26/2022 4/26/2022    Interpretation Summary    Left Ventricle: Normal left ventricular systolic function with a visually estimated EF of 60 - 65%. Left ventricle size is normal. Normal wall thickness. Normal wall motion.   Pulmonary Arteries: Moderate pulmonary hypertension present. The estimated PASP is 50 mmHg. Signed by: Hurtis Litten, MD on 4/26/2022  3:55 PM          PFT: severe obstruction FEV1 44% without bronchodilator response    ASSESSMENT and PLAN  Encounter Diagnoses   Name Primary?  Pulmonary hypertension (HCC) Yes    COPD, severe ([address]     Hypoxemia requiring supplemental oxygen     Obesity (BMI 30.0-34. 9)      Pt with Pulmonary HTN Grp 1 per  History and likely Grp 3 as well. Pt with good response to Trelegy which will be continued along with prn Albuterol. Echo results reviewed with pt, good response to O2 and Ambrisentan   Pt will continue to benefit from supplemental O2  RTC 6 months or sooner prn.   Reviewed PFT and Echo results with pt and daughter

## 2022-09-22 ENCOUNTER — OFFICE VISIT (OUTPATIENT)
Dept: ORTHOPEDIC SURGERY | Age: 67
End: 2022-09-22
Payer: MEDICARE

## 2022-09-22 VITALS — WEIGHT: 198 LBS | OXYGEN SATURATION: 89 % | HEART RATE: 101 BPM | BODY MASS INDEX: 32.95 KG/M2 | TEMPERATURE: 97.7 F

## 2022-09-22 DIAGNOSIS — M17.11 PRIMARY OSTEOARTHRITIS OF RIGHT KNEE: ICD-10-CM

## 2022-09-22 DIAGNOSIS — M25.561 CHRONIC PAIN OF RIGHT KNEE: Primary | ICD-10-CM

## 2022-09-22 DIAGNOSIS — G89.29 CHRONIC PAIN OF RIGHT KNEE: Primary | ICD-10-CM

## 2022-09-22 PROCEDURE — G8400 PT W/DXA NO RESULTS DOC: HCPCS | Performed by: PHYSICIAN ASSISTANT

## 2022-09-22 PROCEDURE — 20611 DRAIN/INJ JOINT/BURSA W/US: CPT | Performed by: PHYSICIAN ASSISTANT

## 2022-09-22 PROCEDURE — G8756 NO BP MEASURE DOC: HCPCS | Performed by: PHYSICIAN ASSISTANT

## 2022-09-22 PROCEDURE — 1090F PRES/ABSN URINE INCON ASSESS: CPT | Performed by: PHYSICIAN ASSISTANT

## 2022-09-22 PROCEDURE — G8427 DOCREV CUR MEDS BY ELIG CLIN: HCPCS | Performed by: PHYSICIAN ASSISTANT

## 2022-09-22 PROCEDURE — 1123F ACP DISCUSS/DSCN MKR DOCD: CPT | Performed by: PHYSICIAN ASSISTANT

## 2022-09-22 PROCEDURE — G8432 DEP SCR NOT DOC, RNG: HCPCS | Performed by: PHYSICIAN ASSISTANT

## 2022-09-22 PROCEDURE — G8536 NO DOC ELDER MAL SCRN: HCPCS | Performed by: PHYSICIAN ASSISTANT

## 2022-09-22 PROCEDURE — 99213 OFFICE O/P EST LOW 20 MIN: CPT | Performed by: PHYSICIAN ASSISTANT

## 2022-09-22 PROCEDURE — 3017F COLORECTAL CA SCREEN DOC REV: CPT | Performed by: PHYSICIAN ASSISTANT

## 2022-09-22 PROCEDURE — 73560 X-RAY EXAM OF KNEE 1 OR 2: CPT | Performed by: PHYSICIAN ASSISTANT

## 2022-09-22 PROCEDURE — G9899 SCRN MAM PERF RSLTS DOC: HCPCS | Performed by: PHYSICIAN ASSISTANT

## 2022-09-22 PROCEDURE — G8417 CALC BMI ABV UP PARAM F/U: HCPCS | Performed by: PHYSICIAN ASSISTANT

## 2022-09-22 PROCEDURE — 1101F PT FALLS ASSESS-DOCD LE1/YR: CPT | Performed by: PHYSICIAN ASSISTANT

## 2022-09-22 RX ORDER — TRIAMCINOLONE ACETONIDE 40 MG/ML
40 INJECTION, SUSPENSION INTRA-ARTICULAR; INTRAMUSCULAR ONCE
Status: COMPLETED | OUTPATIENT
Start: 2022-09-22 | End: 2022-09-22

## 2022-09-22 RX ADMIN — TRIAMCINOLONE ACETONIDE 40 MG: 40 INJECTION, SUSPENSION INTRA-ARTICULAR; INTRAMUSCULAR at 10:42

## 2022-09-22 NOTE — PROGRESS NOTES
Mary Maradiaga  1955   Chief Complaint   Patient presents with    Knee Pain     Right          HISTORY OF PRESENT ILLNESS  Mary Maradiaga is a 69 y.o. female who presents today for evaluation of right knee pain since September 14, 2022. She states she woke up in the morning had a sudden increase of pain. She denies any injury. States that she feels a little swollen. Has gotten cortisone injections from her primary care doctor in the past but has not gotten one anytime recent. .  Pain is a 5/10. Has tried following treatments: Injections:yes; Brace:NO; Therapy:NO; Cane/Crutch:YES       No Known Allergies     Past Medical History:   Diagnosis Date    LALO (acute kidney injury) ([facility] [address] 12/27/2015    ARF (acute respiratory failure) ([facility] [address] 12/27/2015    Asthma     Chronic lung disease     COPD (chronic obstructive pulmonary disease) ([facility])     Dependence on continuous supplemental oxygen     Diastolic heart failure ([facility])     Echocardiogram abnormal 12/29/2015    Hyperdynamic. EF 75%. No WMA. Gr 1 DDfx. Mild RVE. RVSP 60-70 mmHg (prev 120 mmHg on 10/1/15). Mild LAE. Mod-severe ELISHA. Mild-mod TR. Gastric wall thickening 2009    Chronic Proximal Gastric Wall Calcifications. Hyperlipidemia     Hypertension     ANNELIESE (obstructive sleep apnea)     Pulmonary arterial hypertension (HCC)     Pulmonary hypertension ([facility])     RVSP 120 mm Hg, cor pulmonale (echo Oct 2015)    Right upper lobe pneumonia 12/27/2015    Cavitary RUL Pneumonia    TB lung, latent 1986    Diagnosed/Treated. Thallium stress test 11/12/2002    No prior infarction. Minimal to mild apical ischemia vs apical thinning. RVE.       Tuberculosis       Social History     Socioeconomic History    Marital status: SINGLE     Spouse name: Not on file    Number of children: Not on file    Years of education: Not on file    Highest education level: Not on file   Occupational History    Not on file   Tobacco Use    Smoking status: Former     Packs/day: 2.00     Years: 20.00     Pack years: 40.00     Types: Cigarettes     Quit date: 1990     Years since quittin.7    Smokeless tobacco: Never   Vaping Use    Vaping Use: Never used   Substance and Sexual Activity    Alcohol use: Not Currently     Comment: social     Drug use: No    Sexual activity: Not on file   Other Topics Concern    Not on file   Social History Narrative    Not on file     Social Determinants of Health     Financial Resource Strain: Not on file   Food Insecurity: Not on file   Transportation Needs: Not on file   Physical Activity: Not on file   Stress: Not on file   Social Connections: Not on file   Intimate Partner Violence: Not on file   Housing Stability: Not on file      Past Surgical History:   Procedure Laterality Date    BRONCHOSCOPY  2016    Dr. Bucky Henry     HX [address]      HX DILATION AND CURETTAGE      HX GASTRIC BYPASS      HX HYSTERECTOMY      HX ORTHOPAEDIC      bone spur    HX TONSILLECTOMY      HX TUBAL LIGATION        Family History   Problem Relation Age of Onset    Asthma Maternal Aunt     Diabetes Daughter       Current Outpatient Medications   Medication Sig    Ambrisentan 5 mg tablet TAKE 1 TABLET BY MOUTH  DAILY    Trelegy Ellipta 100-62.5-25 mcg inhaler INHALE 1 PUFF EVERY DAY    azelastine (ASTELIN) 137 mcg (0.1 %) nasal spray instill 1 SPRAY into each nostril TWICE DAILY    sertraline (ZOLOFT) 50 mg tablet Take 50 mg by mouth daily. albuterol (PROVENTIL HFA, VENTOLIN HFA, PROAIR HFA) 90 mcg/actuation inhaler Take 2 Puffs by inhalation every four (4) hours as needed for Wheezing or Shortness of Breath.    multivitamin (ONE A DAY) tablet Take 1 Tab by mouth daily.     sildenafil, antihypertensive, (REVATIO) 20 mg tablet TAKE 1 TABLET BY MOUTH THREE TIMES DAILY (Patient taking differently: Take 20 mg by mouth three (3) times daily.)    albuterol-ipratropium (DUO-NEB) 2.5 mg-0.5 mg/3 ml nebu 3 mL by Nebulization route every six (6) hours as needed. Diag. Code: J44.9    OXYGEN-AIR DELIVERY SYSTEMS 6 L by Does Not Apply route. Uses 4 l when out   Indications: DME: [facility]    furosemide (LASIX) 40 mg tablet Take 1 Tab by mouth daily. pravastatin (PRAVACHOL) 20 mg tablet Take 20 mg by mouth nightly. benazepril (LOTENSIN) 20 mg tablet Take 20 mg by mouth daily. Pt taking 30 mg daily. fluticasone propionate (FLONASE) 50 mcg/actuation nasal spray 2 Sprays by Both Nostrils route daily. (Patient not taking: Reported on 9/22/2022)    benazepriL (LOTENSIN) 10 mg tablet Take 10 mg by mouth daily. Pt taking 30 mg daily. (Patient not taking: Reported on 9/22/2022)     No current facility-administered medications for this visit. REVIEW OF SYSTEM   Patient denies: Weight loss, Fever/Chills, HA, Visual changes, Fatigue, Chest pain, SOB, Abdominal pain, N/V/D/C, Blood in stool or urine, Edema. Pertinent positive as above in HPI. All others were negative    PHYSICAL EXAM:   Visit Vitals  Pulse (!) 101   Temp 97.7 °F (36.5 °C) (Temporal)   Wt 198 lb (89.8 kg)   SpO2 (!) 89%   BMI 32.95 kg/m²     The patient is a well-developed, well-nourished female   in no acute distress. The patient is alert and oriented times three. The patient is alert and oriented times three. Mood and affect are normal.  LYMPHATIC: lymph nodes are not enlarged and are within normal limits  SKIN: normal in color and non tender to palpation. There are no bruises or abrasions noted. NEUROLOGICAL: Motor sensory exam is within normal limits. Reflexes are equal bilaterally.  There is normal sensation to pinprick and light touch  MUSCULOSKELETAL:  Examination Right knee   Skin Intact   Range of motion 5-100   Effusion +   Medial joint line tenderness +   Lateral joint line tenderness -   Tenderness Pes Bursa -   Tenderness insertion MCL -   Tenderness insertion LCL -   Jaylas -   Patella crepitus +   Patella grind +   Lachman -   Pivot shift -   Anterior drawer -   Posterior drawer -   Varus stress -   Valgus stress -   Neurovascular Intact   Calf Swelling and Tenderness to Palpation -   Bibi's Test -   Hamstring Cord Tightness -          PROCEDURE: Right knee Injection with Ultrasound Guidance    Indication:Right Knee pain/swelling    After sterile prep, 4 cc of Xylocaine and 1 cc of Kenalog were injected into the right Knee. Intra-articular Ultrasound images captured using [address] Ultrasound machine using a frequency of 10 MHz with a linear transducer and scanned into patient's chart. [facility]  OFFICE PROCEDURE PROGRESS NOTE        Chart reviewed for the following:  Toi GARNICA PA, have reviewed the History, Physical and updated the Allergic reactions for [address] performed immediately prior to start of procedure:  Toi GARNICA PA-C, have performed the following reviews on Usha Burk prior to the start of the procedure:            * Patient was identified by name and date of birth   * Agreement on procedure being performed was verified  * Risks and Benefits explained to the patient  * Procedure site verified and marked as necessary  * Patient was positioned for comfort  * Consent was signed and verified     Time: 10:24 AM    Date of procedure: 2022    Procedure performed by:  EFREN Kirby    Provider assisted by: (see medication administration)    How tolerated by patient: tolerated the procedure well with no complications    Comments: none      IMAGIN view x-rays of the right knee taken in the office on 2022 read and reviewed by myself reveal end-stage degenerative arthritis with significant joint space narrowing worse in the medial and patellofemoral compartments    IMPRESSION:      ICD-10-CM ICD-9-CM    1. Chronic pain of right knee  M25.561 719.46 AMB POC XRAY, KNEE; 1/2 VIEWS    G89.29 338.29       2.  Primary osteoarthritis of right knee  M17.11 715.16            PLAN:   1. Patient with acute exacerbation of degenerative arthritis. We will injection of cortisone today a physician directed exercise program was given to the patient today  Risk factors include: BMI greater than 30  2. Yes cortisone injection indicated today   3. Yes Physical/Occupational Therapy indicated today  4. No diagnostic test indicated today:   5. No durable medical equipment indicated today  6. No referral indicated today   7. No medications indicated today:   8.  No Narcotic indicated today     RTC PRN     [facility]

## 2023-03-30 ENCOUNTER — OFFICE VISIT (OUTPATIENT)
Age: 68
End: 2023-03-30
Payer: MEDICARE

## 2023-03-30 VITALS
WEIGHT: 191.3 LBS | BODY MASS INDEX: 31.87 KG/M2 | TEMPERATURE: 98.2 F | DIASTOLIC BLOOD PRESSURE: 78 MMHG | HEART RATE: 92 BPM | HEIGHT: 65 IN | RESPIRATION RATE: 16 BRPM | OXYGEN SATURATION: 96 % | SYSTOLIC BLOOD PRESSURE: 149 MMHG

## 2023-03-30 DIAGNOSIS — J44.9 COPD, SEVERE (HCC): Primary | ICD-10-CM

## 2023-03-30 DIAGNOSIS — R91.1 LUNG NODULE: ICD-10-CM

## 2023-03-30 DIAGNOSIS — Z87.891 FORMER SMOKER: ICD-10-CM

## 2023-03-30 DIAGNOSIS — F10.10 ETOH ABUSE: ICD-10-CM

## 2023-03-30 DIAGNOSIS — I27.20 PULMONARY HYPERTENSION (HCC): ICD-10-CM

## 2023-03-30 PROCEDURE — 99214 OFFICE O/P EST MOD 30 MIN: CPT | Performed by: INTERNAL MEDICINE

## 2023-03-30 PROCEDURE — G8400 PT W/DXA NO RESULTS DOC: HCPCS | Performed by: INTERNAL MEDICINE

## 2023-03-30 PROCEDURE — 1090F PRES/ABSN URINE INCON ASSESS: CPT | Performed by: INTERNAL MEDICINE

## 2023-03-30 PROCEDURE — G8417 CALC BMI ABV UP PARAM F/U: HCPCS | Performed by: INTERNAL MEDICINE

## 2023-03-30 PROCEDURE — 3017F COLORECTAL CA SCREEN DOC REV: CPT | Performed by: INTERNAL MEDICINE

## 2023-03-30 PROCEDURE — 3078F DIAST BP <80 MM HG: CPT | Performed by: INTERNAL MEDICINE

## 2023-03-30 PROCEDURE — G8484 FLU IMMUNIZE NO ADMIN: HCPCS | Performed by: INTERNAL MEDICINE

## 2023-03-30 PROCEDURE — G8428 CUR MEDS NOT DOCUMENT: HCPCS | Performed by: INTERNAL MEDICINE

## 2023-03-30 PROCEDURE — 3023F SPIROM DOC REV: CPT | Performed by: INTERNAL MEDICINE

## 2023-03-30 PROCEDURE — 4004F PT TOBACCO SCREEN RCVD TLK: CPT | Performed by: INTERNAL MEDICINE

## 2023-03-30 PROCEDURE — 1123F ACP DISCUSS/DSCN MKR DOCD: CPT | Performed by: INTERNAL MEDICINE

## 2023-03-30 PROCEDURE — 3077F SYST BP >= 140 MM HG: CPT | Performed by: INTERNAL MEDICINE

## 2023-03-30 RX ORDER — HYDROXYZINE HYDROCHLORIDE 25 MG/1
25 TABLET, FILM COATED ORAL NIGHTLY
COMMUNITY
Start: 2023-03-17

## 2023-03-30 RX ORDER — CETIRIZINE HYDROCHLORIDE 10 MG/1
10 TABLET ORAL DAILY
COMMUNITY
Start: 2023-01-18

## 2023-03-30 RX ORDER — SERTRALINE HYDROCHLORIDE 100 MG/1
TABLET, FILM COATED ORAL
COMMUNITY
Start: 2023-03-13

## 2023-03-30 ASSESSMENT — ENCOUNTER SYMPTOMS
ABDOMINAL PAIN: 0
NAUSEA: 0
CONSTIPATION: 0
DIARRHEA: 0
CHEST TIGHTNESS: 0
COLOR CHANGE: 0
WHEEZING: 0
SINUS PRESSURE: 0
TROUBLE SWALLOWING: 0
CHOKING: 0
STRIDOR: 0
SHORTNESS OF BREATH: 1
BACK PAIN: 0
BLOOD IN STOOL: 0
COUGH: 0
RHINORRHEA: 1
FACIAL SWELLING: 0
VOMITING: 0
SORE THROAT: 0
VOICE CHANGE: 0
APNEA: 0

## 2023-03-30 NOTE — PROGRESS NOTES
Heriberto Oakes presents today for   Chief Complaint   Patient presents with    COPD    Follow-up     Pulmonary Hypertension       Is someone accompanying this pt? No    Is the patient using any DME equipment during OV? Oxygen    -DME Company [employer]    Depression Screening:    PHQ-9 Questionaire 6/29/2022   Little interest or pleasure in doing things 0   Feeling down, depressed, or hopeless 0   PHQ-9 Total Score 0       Learning Needs Questionnaire:     Who is the primary learner? Patient    What is the preferred language for health care of the primary learner? ENGLISH    How does the primary learner prefer to learn new concepts? DEMONSTRATION    Answered By Patient    Relationship to Learner SELF          Fall Risk:     No flowsheet data found. Abuse Screening:     No flowsheet data found. Coordination of Care:    1. Have you been to the ER, urgent care clinic since your last visit? Hospitalized since your last visit? No    2. Have you seen or consulted any other health care providers outside of the [address] [place] [address] since your last visit? Include any pap smears or colon screening. PCP-PFM    Medication list has been update per patient.
An electronic signature was used to authenticate this note.     --Kristin Santana MD

## 2023-04-07 ENCOUNTER — PROCEDURE VISIT (OUTPATIENT)
Age: 68
End: 2023-04-07

## 2023-04-07 VITALS — RESPIRATION RATE: 20 BRPM | WEIGHT: 194 LBS | BODY MASS INDEX: 32.32 KG/M2 | HEIGHT: 65 IN

## 2023-04-07 DIAGNOSIS — J44.9 CHRONIC OBSTRUCTIVE PULMONARY DISEASE, UNSPECIFIED COPD TYPE (HCC): Primary | ICD-10-CM

## 2023-04-18 ENCOUNTER — HOSPITAL ENCOUNTER (OUTPATIENT)
Facility: HOSPITAL | Age: 68
Discharge: HOME | End: 2023-04-21
Payer: MEDICARE

## 2023-04-18 DIAGNOSIS — Z78.0 ASYMPTOMATIC MENOPAUSAL STATE: ICD-10-CM

## 2023-04-18 DIAGNOSIS — Z12.31 VISIT FOR SCREENING MAMMOGRAM: ICD-10-CM

## 2023-04-18 PROCEDURE — 77063 BREAST TOMOSYNTHESIS BI: CPT

## 2023-04-18 PROCEDURE — 77080 DXA BONE DENSITY AXIAL: CPT

## 2023-04-27 RX ORDER — AMBRISENTAN 5 MG/1
TABLET, FILM COATED ORAL
Qty: 30 TABLET | Refills: 5 | Status: SHIPPED | OUTPATIENT
Start: 2023-04-27

## 2023-05-03 ENCOUNTER — TELEPHONE (OUTPATIENT)
Age: 68
End: 2023-05-03

## 2023-05-03 RX ORDER — AMBRISENTAN 5 MG/1
5 TABLET, FILM COATED ORAL DAILY
Qty: 30 TABLET | Refills: 11 | OUTPATIENT
Start: 2023-05-03

## 2023-05-03 NOTE — TELEPHONE ENCOUNTER
Pt dtr stated that she has receive her bpap and that they are us a heads up for [facility] pharmacy in regards to her medication ambrisenpan.  For further information please call [phone]

## 2023-05-03 NOTE — TELEPHONE ENCOUNTER
Jenny German from [facility] [phone] stated that the medication [employer] pt is not enrolled for. Enrollment number is [id number]. For further information call Jenny German.

## 2023-05-03 NOTE — TELEPHONE ENCOUNTER
Per [employer].  Dr. Araceli Tello who sent in refill for Dr. Mayra Romeo is not enrolled in the [facility] program.   Will have Dr. Mayra Romeo send in the rx now since she is back

## 2023-05-05 RX ORDER — AMBRISENTAN 5 MG/1
5 TABLET, FILM COATED ORAL DAILY
Qty: 30 TABLET | Refills: 11 | Status: SHIPPED | OUTPATIENT
Start: 2023-05-05

## 2023-05-05 NOTE — TELEPHONE ENCOUNTER
Nikia Le from [employer] would like to get LZ to sign off on pt medication Ambrisentan d/t DA not being cert.  For further information please call [phone]

## 2023-05-15 ENCOUNTER — TELEPHONE (OUTPATIENT)
Age: 68
End: 2023-05-15

## 2023-05-15 NOTE — TELEPHONE ENCOUNTER
Pt daughter would like to know the pt O2. Pt daughter stated that [facility] is saying the order that they have is up to 5 but the pt stating that it needs to be higher.; Pt daughter stated that she is constantly falling asleep because she doesn't have enough O2. [facility] Fax: [phone].  Pt is requesting a call back at your earliest. Please call [phone]

## 2023-05-15 NOTE — TELEPHONE ENCOUNTER
Spoke with daughter. She states patient has a oxygen machine that goes up to 10L. She tried to advise what she needed but got all turned around on her need. Finally she mentioned today the machine started messing up and not given the correct liter flow. She will call Ellen to get a new machine.  I advised her when she calls to tell them the machine is not functioning correctly so they now what she is needing

## 2023-05-19 RX ORDER — AMBRISENTAN 5 MG/1
5 TABLET, FILM COATED ORAL DAILY
Qty: 30 TABLET | Refills: 11 | Status: SHIPPED | OUTPATIENT
Start: 2023-05-19

## 2023-05-26 ENCOUNTER — TELEPHONE (OUTPATIENT)
Age: 68
End: 2023-05-26

## 2023-05-30 NOTE — TELEPHONE ENCOUNTER
Per Cassia Mcdaniel the doumentation of failed BiPap has been sent to insurance so they will approve the Life 2000 now

## 2023-07-20 ENCOUNTER — TELEPHONE (OUTPATIENT)
Age: 68
End: 2023-07-20

## 2023-07-20 NOTE — TELEPHONE ENCOUNTER
Pancho Biswas of [place] called on behalf of mutual patient/Shanika Hainesorah. They received our returned fax stating that Dr Filiberto Vega is enrolled in the Ambrisentan REMS program. However, after reaching out to the program, representative of [facility] named Jasper Monroy stated that their records indicate that Dr Filiberto Vega is not certiified. Therefore they can not administer the above Rx to pt until the problem is resolved.     Please contact [facility]  at  [phone]

## 2023-11-30 ENCOUNTER — OFFICE VISIT (OUTPATIENT)
Age: 68
End: 2023-11-30
Payer: MEDICARE

## 2023-11-30 VITALS
BODY MASS INDEX: 31.06 KG/M2 | SYSTOLIC BLOOD PRESSURE: 150 MMHG | DIASTOLIC BLOOD PRESSURE: 70 MMHG | HEIGHT: 65 IN | OXYGEN SATURATION: 93 % | TEMPERATURE: 98.2 F | HEART RATE: 69 BPM | RESPIRATION RATE: 18 BRPM | WEIGHT: 186.4 LBS

## 2023-11-30 DIAGNOSIS — Z87.891 FORMER SMOKER: ICD-10-CM

## 2023-11-30 DIAGNOSIS — R09.02 HYPOXEMIA REQUIRING SUPPLEMENTAL OXYGEN: ICD-10-CM

## 2023-11-30 DIAGNOSIS — R91.1 LUNG NODULE: ICD-10-CM

## 2023-11-30 DIAGNOSIS — I27.20 PULMONARY HYPERTENSION (HCC): ICD-10-CM

## 2023-11-30 DIAGNOSIS — J44.9 COPD, SEVERE (HCC): Primary | ICD-10-CM

## 2023-11-30 DIAGNOSIS — F10.10 ETOH ABUSE: ICD-10-CM

## 2023-11-30 DIAGNOSIS — Z99.81 HYPOXEMIA REQUIRING SUPPLEMENTAL OXYGEN: ICD-10-CM

## 2023-11-30 PROCEDURE — G8484 FLU IMMUNIZE NO ADMIN: HCPCS | Performed by: INTERNAL MEDICINE

## 2023-11-30 PROCEDURE — 3017F COLORECTAL CA SCREEN DOC REV: CPT | Performed by: INTERNAL MEDICINE

## 2023-11-30 PROCEDURE — G8399 PT W/DXA RESULTS DOCUMENT: HCPCS | Performed by: INTERNAL MEDICINE

## 2023-11-30 PROCEDURE — 1123F ACP DISCUSS/DSCN MKR DOCD: CPT | Performed by: INTERNAL MEDICINE

## 2023-11-30 PROCEDURE — 3077F SYST BP >= 140 MM HG: CPT | Performed by: INTERNAL MEDICINE

## 2023-11-30 PROCEDURE — G8427 DOCREV CUR MEDS BY ELIG CLIN: HCPCS | Performed by: INTERNAL MEDICINE

## 2023-11-30 PROCEDURE — 3078F DIAST BP <80 MM HG: CPT | Performed by: INTERNAL MEDICINE

## 2023-11-30 PROCEDURE — 3023F SPIROM DOC REV: CPT | Performed by: INTERNAL MEDICINE

## 2023-11-30 PROCEDURE — 1090F PRES/ABSN URINE INCON ASSESS: CPT | Performed by: INTERNAL MEDICINE

## 2023-11-30 PROCEDURE — 1036F TOBACCO NON-USER: CPT | Performed by: INTERNAL MEDICINE

## 2023-11-30 PROCEDURE — 99214 OFFICE O/P EST MOD 30 MIN: CPT | Performed by: INTERNAL MEDICINE

## 2023-11-30 PROCEDURE — G8417 CALC BMI ABV UP PARAM F/U: HCPCS | Performed by: INTERNAL MEDICINE

## 2023-11-30 RX ORDER — FLUTICASONE FUROATE, UMECLIDINIUM BROMIDE AND VILANTEROL TRIFENATATE 100; 62.5; 25 UG/1; UG/1; UG/1
POWDER RESPIRATORY (INHALATION)
Qty: 1 EACH | Refills: 5 | Status: SHIPPED | OUTPATIENT
Start: 2023-11-30

## 2023-11-30 RX ORDER — KETOCONAZOLE 20 MG/G
CREAM TOPICAL
COMMUNITY
Start: 2023-10-02

## 2023-11-30 RX ORDER — BENAZEPRIL HYDROCHLORIDE 20 MG/1
TABLET ORAL
COMMUNITY
Start: 2023-10-18

## 2023-11-30 ASSESSMENT — ENCOUNTER SYMPTOMS
CONSTIPATION: 0
DIARRHEA: 0
RHINORRHEA: 1
SORE THROAT: 0
COUGH: 0
WHEEZING: 0
VOICE CHANGE: 0
CHEST TIGHTNESS: 0
CHOKING: 0
APNEA: 0
NAUSEA: 0
FACIAL SWELLING: 0
TROUBLE SWALLOWING: 0
SHORTNESS OF BREATH: 1
ABDOMINAL PAIN: 0
COLOR CHANGE: 0
BACK PAIN: 0
SINUS PRESSURE: 0
VOMITING: 0
STRIDOR: 0
BLOOD IN STOOL: 0

## 2023-11-30 NOTE — PROGRESS NOTES
Gerardoalbert Castellano presents today for   Chief Complaint   Patient presents with    COPD    Follow-up     Pulmonary Hypertension       Is someone accompanying this pt? Daughter    Is the patient using any DME equipment during [address]? Oxygen    -DME Company [employer]    Depression Screenin/29/2022     1:58 PM   PHQ-9 Questionaire   Little interest or pleasure in doing things 0   Feeling down, depressed, or hopeless 0   PHQ-9 Total Score 0       Learning Needs Questionnaire:     No question data found. Fall Risk:          No data to display                 Abuse Screening:          No data to display                  Coordination of Care:    1. Have you been to the ER, urgent care clinic since your last visit? Hospitalized since your last visit? No    2. Have you seen or consulted any other health care providers outside of the [address] [place] [address] Avenue since your last visit? Include any pap smears or colon screening. PCP    Medication list has been update per patient.
motion. Pulmonary Arteries: Moderate pulmonary hypertension present. The estimated PASP is 50 mmHg. Order-Level Documents:    Document on 3/8/2023 7:10 PM         Procedure Staff    Technologist/Clinician: Dasha Wade  Supporting Staff: None  Performing Physician/Midlevel: None     Exam Completion Date/Time:    Left Ventricle Measurements    Dimensions   Fractional Shortening 2D 41 %  (Range: 28 - 44)         LVIDd 3.9 cm  (Range: 3.9 - 5.3)         LVIDd Index 2 cm/m2          LVIDs 2.3 cm          LVIDs Index 1.18 cm/m2          IVSd 0.8 cm  (Range: 0.6 - 0.9)         LVPWd 1 cm  (Range: 0.6 - 0.9) Important          LV RWT Ratio 0.51          LV Mass 2D 105.3 g  (Range: 67 - 162)         LV Mass 2D Index 54 g/m2  (Range: 43 - 95)                Tricuspid Valve Measurements    Regurgitation   PASP 50 mmHg          TR Max Velocity 3.23 m/s          TR Peak Gradient 42 mmHg                 Reading Providers      Reading Role   Grecia Power  Unknown Provider Result                               An electronic signature was used to authenticate this note.     --Vicente Brown MD

## 2024-01-24 ENCOUNTER — HOSPITAL ENCOUNTER (OUTPATIENT)
Facility: HOSPITAL | Age: 69
Discharge: HOME | End: 2024-01-27
Payer: MEDICARE

## 2024-01-24 DIAGNOSIS — J41.0 SIMPLE CHRONIC BRONCHITIS (HCC): ICD-10-CM

## 2024-01-24 PROCEDURE — 71046 X-RAY EXAM CHEST 2 VIEWS: CPT

## 2024-05-31 ENCOUNTER — TELEPHONE (OUTPATIENT)
Age: 69
End: 2024-05-31

## 2024-05-31 NOTE — TELEPHONE ENCOUNTER
Pt's needs a new order sent in to [facility].  Pt is now on 10L 02 and in need of an oxygen mask.  Pls call

## 2024-06-03 RX ORDER — FLUTICASONE FUROATE, UMECLIDINIUM BROMIDE AND VILANTEROL TRIFENATATE 100; 62.5; 25 UG/1; UG/1; UG/1
POWDER RESPIRATORY (INHALATION)
Qty: 1 EACH | Refills: 5 | Status: SHIPPED | OUTPATIENT
Start: 2024-06-03

## 2024-06-03 RX ORDER — AMBRISENTAN 5 MG/1
5 TABLET, FILM COATED ORAL DAILY
Qty: 30 TABLET | Refills: 11 | Status: SHIPPED | OUTPATIENT
Start: 2024-06-03

## 2024-06-03 NOTE — TELEPHONE ENCOUNTER
Pt daughter is calling to request refills for ambrisentan 5mg and trelegy inhaler, pls send meds to [facility] pharmacy mail order. Pls advise, if any questions, call daughter at [phone]. Pt has only 5 pills left, per daughter pls expedite the order if possible.     Pt has f/u with Dr. ROLDAN on 12/19

## 2024-06-07 NOTE — TELEPHONE ENCOUNTER
Patient's daughter informed Trelegy and Ambrisentan medication was sent to [facility] as requested. Unable to change pharmacy in med management. Daughter understands

## 2024-06-07 NOTE — TELEPHONE ENCOUNTER
Pt daughter is calling to request refills for ambrisentan 5mg and trelegy inhaler, pls send meds to [facility] pharmacy mail order. Pls advise, if any questions, call daughter at [phone]. Pt has only 5 pills left, per daughter pls expedite the order if possible.     Refills sent on 6/3 were sent to wrong pharmacy. Pt deals with [facility] pharm. Pls send to [facility]. Pt is currently out of meds     Pt has f/u with Dr. ROLDAN on 12/19

## 2024-06-10 ENCOUNTER — TELEPHONE (OUTPATIENT)
Age: 69
End: 2024-06-10

## 2024-06-10 NOTE — TELEPHONE ENCOUNTER
Monica at [facility] called through answering service in regs to refill.  Please call [phone].

## 2024-06-12 ENCOUNTER — NURSE ONLY (OUTPATIENT)
Age: 69
End: 2024-06-12

## 2024-06-12 VITALS
OXYGEN SATURATION: 91 % | SYSTOLIC BLOOD PRESSURE: 148 MMHG | BODY MASS INDEX: 31.65 KG/M2 | HEIGHT: 65 IN | DIASTOLIC BLOOD PRESSURE: 76 MMHG | HEART RATE: 76 BPM | WEIGHT: 190 LBS

## 2024-06-12 DIAGNOSIS — J44.9 CHRONIC OBSTRUCTIVE PULMONARY DISEASE, UNSPECIFIED COPD TYPE (HCC): Primary | ICD-10-CM

## 2024-06-12 DIAGNOSIS — J44.9 COPD, SEVERE (HCC): Primary | ICD-10-CM

## 2024-06-26 ENCOUNTER — OFFICE VISIT (OUTPATIENT)
Age: 69
End: 2024-06-26
Payer: MEDICARE

## 2024-06-26 VITALS
SYSTOLIC BLOOD PRESSURE: 126 MMHG | DIASTOLIC BLOOD PRESSURE: 60 MMHG | HEIGHT: 65 IN | HEART RATE: 72 BPM | BODY MASS INDEX: 31.32 KG/M2 | WEIGHT: 188 LBS | OXYGEN SATURATION: 87 %

## 2024-06-26 DIAGNOSIS — E78.5 DYSLIPIDEMIA: ICD-10-CM

## 2024-06-26 DIAGNOSIS — Z99.81 OXYGEN DEPENDENT: ICD-10-CM

## 2024-06-26 DIAGNOSIS — I27.20 PULMONARY HYPERTENSION (HCC): ICD-10-CM

## 2024-06-26 DIAGNOSIS — I10 PRIMARY HYPERTENSION: Primary | ICD-10-CM

## 2024-06-26 DIAGNOSIS — J44.9 CHRONIC OBSTRUCTIVE PULMONARY DISEASE, UNSPECIFIED COPD TYPE (HCC): ICD-10-CM

## 2024-06-26 PROCEDURE — 1123F ACP DISCUSS/DSCN MKR DOCD: CPT | Performed by: INTERNAL MEDICINE

## 2024-06-26 PROCEDURE — 3078F DIAST BP <80 MM HG: CPT | Performed by: INTERNAL MEDICINE

## 2024-06-26 PROCEDURE — 1036F TOBACCO NON-USER: CPT | Performed by: INTERNAL MEDICINE

## 2024-06-26 PROCEDURE — G8399 PT W/DXA RESULTS DOCUMENT: HCPCS | Performed by: INTERNAL MEDICINE

## 2024-06-26 PROCEDURE — 3023F SPIROM DOC REV: CPT | Performed by: INTERNAL MEDICINE

## 2024-06-26 PROCEDURE — 3017F COLORECTAL CA SCREEN DOC REV: CPT | Performed by: INTERNAL MEDICINE

## 2024-06-26 PROCEDURE — G8417 CALC BMI ABV UP PARAM F/U: HCPCS | Performed by: INTERNAL MEDICINE

## 2024-06-26 PROCEDURE — 93000 ELECTROCARDIOGRAM COMPLETE: CPT | Performed by: INTERNAL MEDICINE

## 2024-06-26 PROCEDURE — 3074F SYST BP LT 130 MM HG: CPT | Performed by: INTERNAL MEDICINE

## 2024-06-26 PROCEDURE — 1090F PRES/ABSN URINE INCON ASSESS: CPT | Performed by: INTERNAL MEDICINE

## 2024-06-26 PROCEDURE — 99204 OFFICE O/P NEW MOD 45 MIN: CPT | Performed by: INTERNAL MEDICINE

## 2024-06-26 PROCEDURE — G8427 DOCREV CUR MEDS BY ELIG CLIN: HCPCS | Performed by: INTERNAL MEDICINE

## 2024-06-26 ASSESSMENT — ENCOUNTER SYMPTOMS
ABDOMINAL DISTENTION: 0
VOMITING: 0
NAUSEA: 0
SHORTNESS OF BREATH: 1
COUGH: 0
ABDOMINAL PAIN: 0
SORE THROAT: 0

## 2024-06-26 ASSESSMENT — ANXIETY QUESTIONNAIRES
7. FEELING AFRAID AS IF SOMETHING AWFUL MIGHT HAPPEN: NOT AT ALL
3. WORRYING TOO MUCH ABOUT DIFFERENT THINGS: NOT AT ALL
5. BEING SO RESTLESS THAT IT IS HARD TO SIT STILL: NOT AT ALL
1. FEELING NERVOUS, ANXIOUS, OR ON EDGE: NOT AT ALL
GAD7 TOTAL SCORE: 0
6. BECOMING EASILY ANNOYED OR IRRITABLE: NOT AT ALL
2. NOT BEING ABLE TO STOP OR CONTROL WORRYING: NOT AT ALL
4. TROUBLE RELAXING: NOT AT ALL
IF YOU CHECKED OFF ANY PROBLEMS ON THIS QUESTIONNAIRE, HOW DIFFICULT HAVE THESE PROBLEMS MADE IT FOR YOU TO DO YOUR WORK, TAKE CARE OF THINGS AT HOME, OR GET ALONG WITH OTHER PEOPLE: NOT DIFFICULT AT ALL

## 2024-06-26 ASSESSMENT — PATIENT HEALTH QUESTIONNAIRE - PHQ9
SUM OF ALL RESPONSES TO PHQ QUESTIONS 1-9: 0
2. FEELING DOWN, DEPRESSED OR HOPELESS: NOT AT ALL
SUM OF ALL RESPONSES TO PHQ QUESTIONS 1-9: 0
1. LITTLE INTEREST OR PLEASURE IN DOING THINGS: NOT AT ALL
SUM OF ALL RESPONSES TO PHQ9 QUESTIONS 1 & 2: 0

## 2024-06-26 NOTE — PROGRESS NOTES
Vita DOUGIE Zaki presents today for   Chief Complaint   Patient presents with    New Patient     Last seen 2021        Vita Haines preferred language for health care discussion is english/other.    Is someone accompanying this pt? no    Is the patient using any DME equipment during OV? yes    Depression Screening:  Depression: Not at risk (6/26/2024)    PHQ-2     PHQ-2 Score: 0        Learning Assessment:  Who is the primary learner? Patient    What is the preferred language for health care of the primary learner? ENGLISH    How does the primary learner prefer to learn new concepts? DEMONSTRATION    Answered By patient    Relationship to Learner SELF           Pt currently taking Anticoagulant therapy? no    Pt currently taking Antiplatelet therapy ? no      Coordination of Care:  1. Have you been to the ER, urgent care clinic since your last visit? Hospitalized since your last visit? no    2. Have you seen or consulted any other health care providers outside of the [facility] since your last visit? Include any pap smears or colon screening. no    
still is short of breath with any activity.  Her oxygen saturation is in the upper 80s on this regimen.  This is likely the cause of most of her symptoms.    Hypertension.  Patient blood pressure is well-controlled on benazepril as monotherapy.  In the future if her systemic blood pressure starts to drop, I would consider decreasing or stopping this medication altogether.    History of leg swelling.  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  She has no swelling on exam today.  In fact her weight is better than it has been in the past.  She has been maintained on furosemide 40 mg daily.  I will continue this unless she develops hypotension.    As long as her echocardiogram is unchanged compared to her prior study, she can follow-up in the future as needed.        Antonio Arndt MD

## 2024-07-22 ENCOUNTER — TELEPHONE (OUTPATIENT)
Age: 69
End: 2024-07-22

## 2024-07-22 NOTE — TELEPHONE ENCOUNTER
----- Message from Antonio Arndt MD sent at 7/11/2024  2:43 PM EDT -----  Please let the patient know that her pulm hypertension was slightly higher than her previous study from 2 years ago.  Her last pressure measured 50 and is currently 60.  I will defer further management to her pulmonologist.  ----- Message -----  From: Rozina Andino MA  Sent: 7/11/2024   9:22 AM EDT  To: Antonio Arndt MD    Per your last note \"  Pulmonary hypertension.  This is managed by her pulmonologist with vasodilators.  Her last PASP was 50 mmHg on echocardiogram in 2022.  I have recommended repeat an echocardiogram for further evaluation.

## 2024-07-22 NOTE — TELEPHONE ENCOUNTER
Verbal order and read back per Antonio Arndt MD  Please let the patient know that her pulm hypertension was slightly higher than her previous study from 2 years ago.  Her last pressure measured 50 and is currently 60.  I will defer further management to her pulmonologist.     This has been fully explained to the patient, who indicates understanding.    -she has an appointment with Dr. Zavala on 12-19-24

## 2024-08-13 ENCOUNTER — OFFICE VISIT (OUTPATIENT)
Age: 69
End: 2024-08-13
Payer: MEDICARE

## 2024-08-13 VITALS
OXYGEN SATURATION: 90 % | BODY MASS INDEX: 31.12 KG/M2 | HEART RATE: 108 BPM | TEMPERATURE: 97 F | SYSTOLIC BLOOD PRESSURE: 153 MMHG | WEIGHT: 187 LBS | DIASTOLIC BLOOD PRESSURE: 75 MMHG | RESPIRATION RATE: 16 BRPM

## 2024-08-13 DIAGNOSIS — J96.11 CHRONIC HYPOXEMIC RESPIRATORY FAILURE (HCC): ICD-10-CM

## 2024-08-13 DIAGNOSIS — R91.1 LUNG NODULE: ICD-10-CM

## 2024-08-13 DIAGNOSIS — I27.20 PULMONARY HYPERTENSION (HCC): ICD-10-CM

## 2024-08-13 DIAGNOSIS — J44.9 COPD, SEVERE (HCC): Primary | ICD-10-CM

## 2024-08-13 PROCEDURE — 3077F SYST BP >= 140 MM HG: CPT | Performed by: INTERNAL MEDICINE

## 2024-08-13 PROCEDURE — G8427 DOCREV CUR MEDS BY ELIG CLIN: HCPCS | Performed by: INTERNAL MEDICINE

## 2024-08-13 PROCEDURE — 3078F DIAST BP <80 MM HG: CPT | Performed by: INTERNAL MEDICINE

## 2024-08-13 PROCEDURE — 1123F ACP DISCUSS/DSCN MKR DOCD: CPT | Performed by: INTERNAL MEDICINE

## 2024-08-13 PROCEDURE — 99214 OFFICE O/P EST MOD 30 MIN: CPT | Performed by: INTERNAL MEDICINE

## 2024-08-13 PROCEDURE — 1036F TOBACCO NON-USER: CPT | Performed by: INTERNAL MEDICINE

## 2024-08-13 PROCEDURE — G8399 PT W/DXA RESULTS DOCUMENT: HCPCS | Performed by: INTERNAL MEDICINE

## 2024-08-13 PROCEDURE — 1090F PRES/ABSN URINE INCON ASSESS: CPT | Performed by: INTERNAL MEDICINE

## 2024-08-13 PROCEDURE — 3023F SPIROM DOC REV: CPT | Performed by: INTERNAL MEDICINE

## 2024-08-13 PROCEDURE — G8417 CALC BMI ABV UP PARAM F/U: HCPCS | Performed by: INTERNAL MEDICINE

## 2024-08-13 PROCEDURE — 3017F COLORECTAL CA SCREEN DOC REV: CPT | Performed by: INTERNAL MEDICINE

## 2024-08-13 RX ORDER — FLUTICASONE PROPIONATE 50 MCG
2 SPRAY, SUSPENSION (ML) NASAL DAILY
Qty: 16 G | Refills: 5 | Status: SHIPPED | OUTPATIENT
Start: 2024-08-13

## 2024-08-13 RX ORDER — SILDENAFIL CITRATE 20 MG/1
20 TABLET ORAL 3 TIMES DAILY
Qty: 90 TABLET | Refills: 3 | Status: SHIPPED | OUTPATIENT
Start: 2024-08-13

## 2024-08-13 ASSESSMENT — ENCOUNTER SYMPTOMS
CHEST TIGHTNESS: 0
RHINORRHEA: 1
BACK PAIN: 0
ABDOMINAL PAIN: 0
VOMITING: 0
CHOKING: 0
APNEA: 0
DIARRHEA: 0
SINUS PRESSURE: 0
WHEEZING: 0
TROUBLE SWALLOWING: 0
NAUSEA: 0
COLOR CHANGE: 0
VOICE CHANGE: 0
COUGH: 0
CONSTIPATION: 0
SORE THROAT: 0
SHORTNESS OF BREATH: 1
FACIAL SWELLING: 0
BLOOD IN STOOL: 0

## 2024-08-13 NOTE — PROGRESS NOTES
Vita Haines (:  1955) is a 69 y.o. female,Established patient, here for evaluation of the following chief complaint(s):  COPD, Follow-up (Pulmonary hypertension, unspecified/Chronic Respiratory Failure with Hypoxemia/Lung Nodule), and Shortness of Breath      Assessment & Plan   ASSESSMENT/PLAN:  1. COPD, severe (HCC)  -     DME - DURABLE MEDICAL EQUIPMENT  2. Pulmonary hypertension (HCC)  -     DME - DURABLE MEDICAL EQUIPMENT  3. Lung nodule  4. Chronic hypoxemic respiratory failure (HCC)        Pt with COPD and pulmonary hypertension likely WHO Grp 3/1  Continue triple therapy and prn Albuterol at same doses  Start Revatio 20 mg tid in addition to Ambrisentan  Pt and daughter educated on possible side effects and instructed to call and discontinue Sildenafil if so.  Pt will continue to benefit from supplemental O2. Will need higher flow rates up to 10 LPM. DME written.  Counseled pt on the importance of medication and NIV compliance.   Echo, PFT and 6 minute walk results reviewed with pt and daughter  Return in about 3 months (around 2024).           Subjective   SUBJECTIVE/OBJECTIVE:  Patient with severe COPD and Pulmonary HTN with hypoxemia on LTOT with low O2 sats at baseline. She was followed by Dr. Montana who started her on Letairis 5 mg daily. She is also on Sildenafil which was discontinued in the interim. She was admitted to [facility] end of Dec 2015 with respiratory failure, worsened hypoxemia, cough, blood streaked sputum. CT chest and CXR showing new RT upper lobe pneumonia and cavitation and ended up in ICU on BIPAP.  She also underwent a bronchoscopy and BAL in the icu on 16 showing CHRISTINA and has since completed triple therapy.  Pt is compliant with supplemental O2 which she now uses continuously at 10 LPM when on home O2 concentrator. SpO2 however is in the low 90's but dips into the 80's despite supplemental O2 use. Pt was started on NIV on a prior visit but admits to

## 2024-08-13 NOTE — PROGRESS NOTES
Vita CONSTANTINO Zaki presents today for   Chief Complaint   Patient presents with    COPD    Follow-up     Pulmonary hypertension, unspecified  Chronic Respiratory Failure with Hypoxemia  Lung Nodule       Is someone accompanying this pt? Daughter    Is the patient using any DME equipment during OV? Oxygen/NIV    -[employer]    Depression Screenin/26/2024     3:37 PM   PHQ-9 Questionaire   Little interest or pleasure in doing things 0   Feeling down, depressed, or hopeless 0   PHQ-9 Total Score 0       Learning Needs Questionnaire:     Who is the primary learner? Patient    What is the preferred language for health care of the primary learner? ENGLISH    How does the primary learner prefer to learn new concepts? DEMONSTRATION    Answered By Patient    Relationship to Learner SELF          Fall Risk:          No data to display                 Abuse Screening:          No data to display                  Coordination of Care:    1. Have you been to the ER, urgent care clinic since your last visit? Hospitalized since your last visit? No    2. Have you seen or consulted any other health care providers outside of the [facility] since your last visit? Include any pap smears or colon screening. PCP    Medication list has been update per patient.

## 2024-08-20 ENCOUNTER — TELEPHONE (OUTPATIENT)
Age: 69
End: 2024-08-20

## 2024-08-20 DIAGNOSIS — I27.20 PULMONARY HYPERTENSION (HCC): Primary | ICD-10-CM

## 2024-08-20 NOTE — TELEPHONE ENCOUNTER
Pt's daughter stated that the insurance company denied new medication order     Pt's daughter does not know the name of the medication    Pt's daughter requesting doctor to override the order    Pt's daughter requesting call back

## 2024-08-26 ENCOUNTER — TELEPHONE (OUTPATIENT)
Age: 69
End: 2024-08-26

## 2024-08-26 RX ORDER — SILDENAFIL CITRATE 20 MG/1
TABLET ORAL
Qty: 90 TABLET | Refills: 3 | Status: SHIPPED | OUTPATIENT
Start: 2024-08-26

## 2024-08-26 NOTE — TELEPHONE ENCOUNTER
Pt's daughter called to check on the order again    Pt;s daughter stated she will call pharmacy back again

## 2024-08-26 NOTE — TELEPHONE ENCOUNTER
Spoke with the daughter she wants to know if the appeal has been done yet for the sildenafil?  She went and got her mother a new bp cuff bp today was 151.92.   Discussed with the daughter using the good RX coupon to fill until appeal is processed.  30days at [facility] is 20.20.  She would like it sent to [facility] until things are sorted out with the insurance.  RX entered

## 2024-08-26 NOTE — TELEPHONE ENCOUNTER
Pt daughter is calling to f/u to see if appeal was done and sent back to [place]. Also, Dr. ROLDAN told pt to monitor blood pressure. Pt daughter states BP's has been high and pt feel dizzy. Pls call pt daughter at [phone].

## 2024-10-23 RX ORDER — FLUTICASONE FUROATE, UMECLIDINIUM BROMIDE AND VILANTEROL TRIFENATATE 100; 62.5; 25 UG/1; UG/1; UG/1
POWDER RESPIRATORY (INHALATION)
Qty: 180 EACH | Refills: 3 | Status: SHIPPED | OUTPATIENT
Start: 2024-10-23

## 2025-01-06 ENCOUNTER — OFFICE VISIT (OUTPATIENT)
Age: 70
End: 2025-01-06
Payer: MEDICARE

## 2025-01-06 VITALS
WEIGHT: 189.8 LBS | HEART RATE: 99 BPM | BODY MASS INDEX: 31.58 KG/M2 | TEMPERATURE: 98 F | SYSTOLIC BLOOD PRESSURE: 154 MMHG | RESPIRATION RATE: 16 BRPM | OXYGEN SATURATION: 91 % | DIASTOLIC BLOOD PRESSURE: 90 MMHG

## 2025-01-06 DIAGNOSIS — Z87.891 FORMER SMOKER: ICD-10-CM

## 2025-01-06 DIAGNOSIS — I27.20 PULMONARY HYPERTENSION (HCC): ICD-10-CM

## 2025-01-06 DIAGNOSIS — J96.11 CHRONIC HYPOXEMIC RESPIRATORY FAILURE: ICD-10-CM

## 2025-01-06 DIAGNOSIS — R91.1 LUNG NODULE: ICD-10-CM

## 2025-01-06 DIAGNOSIS — J44.9 COPD, SEVERE (HCC): Primary | ICD-10-CM

## 2025-01-06 PROCEDURE — 99214 OFFICE O/P EST MOD 30 MIN: CPT | Performed by: INTERNAL MEDICINE

## 2025-01-06 PROCEDURE — 3080F DIAST BP >= 90 MM HG: CPT | Performed by: INTERNAL MEDICINE

## 2025-01-06 PROCEDURE — 1160F RVW MEDS BY RX/DR IN RCRD: CPT | Performed by: INTERNAL MEDICINE

## 2025-01-06 PROCEDURE — 1159F MED LIST DOCD IN RCRD: CPT | Performed by: INTERNAL MEDICINE

## 2025-01-06 PROCEDURE — 1126F AMNT PAIN NOTED NONE PRSNT: CPT | Performed by: INTERNAL MEDICINE

## 2025-01-06 PROCEDURE — 1123F ACP DISCUSS/DSCN MKR DOCD: CPT | Performed by: INTERNAL MEDICINE

## 2025-01-06 PROCEDURE — 3077F SYST BP >= 140 MM HG: CPT | Performed by: INTERNAL MEDICINE

## 2025-01-06 ASSESSMENT — ENCOUNTER SYMPTOMS
BLOOD IN STOOL: 0
WHEEZING: 0
COUGH: 0
VOMITING: 0
SHORTNESS OF BREATH: 1
SINUS PRESSURE: 0
APNEA: 0
BACK PAIN: 0
ABDOMINAL PAIN: 0
NAUSEA: 0
RHINORRHEA: 0
COLOR CHANGE: 0
STRIDOR: 0
CHOKING: 0
VOICE CHANGE: 0
TROUBLE SWALLOWING: 0
DIARRHEA: 0
SORE THROAT: 0
FACIAL SWELLING: 0
CHEST TIGHTNESS: 0
CONSTIPATION: 0

## 2025-01-06 NOTE — PROGRESS NOTES
Vita Haines presents today for   Chief Complaint   Patient presents with    Follow-up Chronic Condition     COPD, severe  Pulmonary hypertension  Chronic hypoxemic respiratory failure       Is someone accompanying this pt? Daughter    Is the patient using any DME equipment during OV? Oxygen/NIV    -DME Company [employer]    Depression Screenin/26/2024     3:37 PM   PHQ-9 Questionaire   Little interest or pleasure in doing things 0   Feeling down, depressed, or hopeless 0   PHQ-9 Total Score 0       Learning Needs Questionnaire:     No question data found.      Fall Risk:          No data to display                 Abuse Screening:          No data to display                  Coordination of Care:    1. Have you been to the ER, urgent care clinic since your last visit? Hospitalized since your last visit? No    2. Have you seen or consulted any other health care providers outside of the [facility] since your last visit? Include any pap smears or colon screening. PCP    Medication list has been update per patient.        
CONTRAST/BUBBLE/STRAIN/3D) 07/10/2024  8:59 AM (Final)    Interpretation Summary    Left Ventricle: Normal left ventricular systolic function with a visually estimated EF of 55%. Left ventricle is smaller than normal. LVIDd is 3.7 cm. Normal wall thickness. Normal wall motion. Grade I diastolic dysfunction with normal LAP.    Right Ventricle: Right ventricle is moderately dilated. RV basal diameter is 4.6 cm. Normal systolic function. TAPSE is 2.8 cm.    Right Atrium: Right atrium is dilated.    Tricuspid Valve: Mild regurgitation. Moderately elevated RVSP, consistent with moderate pulmonary hypertension. The estimated RVSP is 60 mmHg.    Aorta: Mildly dilated aortic root. Ao root diameter is 3.5 cm.    Signed by: Ryan Judd MD on 7/10/2024  8:59 AM                                 An electronic signature was used to authenticate this note.    --Jeanine Zavala MD

## 2025-01-24 RX ORDER — AMBRISENTAN 5 MG/1
5 TABLET, FILM COATED ORAL DAILY
Qty: 30 TABLET | Refills: 11 | Status: SHIPPED | OUTPATIENT
Start: 2025-01-24

## 2025-01-31 ENCOUNTER — TELEPHONE (OUTPATIENT)
Age: 70
End: 2025-01-31

## 2025-01-31 NOTE — TELEPHONE ENCOUNTER
Pt's daughter called to let us know that the pt is now with [facility] instead of [facility] pharmacy. She needs Dr. Zavala to resend the order for   Ambrisentan. [facility] phone number is [phone]

## 2025-07-14 ENCOUNTER — TELEPHONE (OUTPATIENT)
Age: 70
End: 2025-07-14

## 2025-07-14 RX ORDER — AMBRISENTAN 5 MG/1
5 TABLET, FILM COATED ORAL DAILY
Qty: 30 TABLET | Refills: 3 | Status: SHIPPED | OUTPATIENT
Start: 2025-07-14

## 2025-07-14 RX ORDER — FLUTICASONE PROPIONATE 50 MCG
2 SPRAY, SUSPENSION (ML) NASAL DAILY
Qty: 1 EACH | Refills: 3 | Status: SHIPPED | OUTPATIENT
Start: 2025-07-14

## 2025-07-14 RX ORDER — FLUTICASONE FUROATE, UMECLIDINIUM BROMIDE AND VILANTEROL TRIFENATATE 100; 62.5; 25 UG/1; UG/1; UG/1
POWDER RESPIRATORY (INHALATION)
Qty: 1 EACH | Refills: 3 | Status: SHIPPED | OUTPATIENT
Start: 2025-07-14